# Patient Record
Sex: FEMALE | Race: WHITE | NOT HISPANIC OR LATINO | URBAN - METROPOLITAN AREA
[De-identification: names, ages, dates, MRNs, and addresses within clinical notes are randomized per-mention and may not be internally consistent; named-entity substitution may affect disease eponyms.]

---
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
## 2022-07-23 ENCOUNTER — APPOINTMENT (OUTPATIENT)
Dept: PEDIATRICS | Facility: CLINIC | Age: 22
End: 2022-07-23

## 2022-08-23 RX ORDER — OFLOXACIN OTIC 3 MG/ML
0.3 SOLUTION AURICULAR (OTIC)
Qty: 1 | Refills: 0 | Status: ACTIVE | COMMUNITY
Start: 2022-08-23 | End: 1900-01-01

## 2022-08-23 RX ORDER — AMOXICILLIN AND CLAVULANATE POTASSIUM 875; 125 MG/1; MG/1
875-125 TABLET, COATED ORAL
Qty: 14 | Refills: 0 | Status: ACTIVE | COMMUNITY
Start: 2021-03-23 | End: 1900-01-01

## 2022-08-30 ENCOUNTER — EMERGENCY (EMERGENCY)
Facility: HOSPITAL | Age: 22
LOS: 0 days | Discharge: HOME | End: 2022-08-31
Attending: EMERGENCY MEDICINE | Admitting: EMERGENCY MEDICINE

## 2022-08-30 VITALS
HEART RATE: 111 BPM | DIASTOLIC BLOOD PRESSURE: 83 MMHG | TEMPERATURE: 209 F | OXYGEN SATURATION: 98 % | RESPIRATION RATE: 20 BRPM | SYSTOLIC BLOOD PRESSURE: 129 MMHG | WEIGHT: 116.84 LBS

## 2022-08-30 DIAGNOSIS — Z79.4 LONG TERM (CURRENT) USE OF INSULIN: ICD-10-CM

## 2022-08-30 DIAGNOSIS — J02.9 ACUTE PHARYNGITIS, UNSPECIFIED: ICD-10-CM

## 2022-08-30 DIAGNOSIS — U07.1 COVID-19: ICD-10-CM

## 2022-08-30 DIAGNOSIS — E10.9 TYPE 1 DIABETES MELLITUS WITHOUT COMPLICATIONS: ICD-10-CM

## 2022-08-30 DIAGNOSIS — R00.2 PALPITATIONS: ICD-10-CM

## 2022-08-30 DIAGNOSIS — R00.0 TACHYCARDIA, UNSPECIFIED: ICD-10-CM

## 2022-08-30 DIAGNOSIS — E06.3 AUTOIMMUNE THYROIDITIS: ICD-10-CM

## 2022-08-30 DIAGNOSIS — J34.89 OTHER SPECIFIED DISORDERS OF NOSE AND NASAL SINUSES: ICD-10-CM

## 2022-08-30 DIAGNOSIS — Z90.89 ACQUIRED ABSENCE OF OTHER ORGANS: Chronic | ICD-10-CM

## 2022-08-30 LAB
BASE EXCESS BLDV CALC-SCNC: 0.1 MMOL/L — SIGNIFICANT CHANGE UP (ref -2–3)
CA-I SERPL-SCNC: 1.2 MMOL/L — SIGNIFICANT CHANGE UP (ref 1.15–1.33)
GAS PNL BLDV: 137 MMOL/L — SIGNIFICANT CHANGE UP (ref 136–145)
GAS PNL BLDV: SIGNIFICANT CHANGE UP
HCO3 BLDV-SCNC: 25 MMOL/L — SIGNIFICANT CHANGE UP (ref 22–29)
HCT VFR BLDA CALC: 33 % — LOW (ref 39–51)
HGB BLD CALC-MCNC: 11.1 G/DL — LOW (ref 12.6–17.4)
LACTATE BLDV-MCNC: 1.1 MMOL/L — SIGNIFICANT CHANGE UP (ref 0.5–2)
PCO2 BLDV: 39 MMHG — SIGNIFICANT CHANGE UP (ref 39–42)
PH BLDV: 7.41 — SIGNIFICANT CHANGE UP (ref 7.32–7.43)
PO2 BLDV: 39 MMHG — SIGNIFICANT CHANGE UP
POTASSIUM BLDV-SCNC: 3 MMOL/L — LOW (ref 3.5–5.1)
SAO2 % BLDV: 67.4 % — SIGNIFICANT CHANGE UP

## 2022-08-30 PROCEDURE — 99285 EMERGENCY DEPT VISIT HI MDM: CPT

## 2022-08-30 PROCEDURE — 93010 ELECTROCARDIOGRAM REPORT: CPT

## 2022-08-30 RX ORDER — SODIUM CHLORIDE 9 MG/ML
1000 INJECTION, SOLUTION INTRAVENOUS ONCE
Refills: 0 | Status: COMPLETED | OUTPATIENT
Start: 2022-08-30 | End: 2022-08-30

## 2022-08-30 RX ADMIN — SODIUM CHLORIDE 1000 MILLILITER(S): 9 INJECTION, SOLUTION INTRAVENOUS at 23:46

## 2022-08-30 NOTE — ED PROVIDER NOTE - PROGRESS NOTE DETAILS
[place]: Results d/w patient and family and copies of results provided.  Pt instructed to return if any worsening symptoms or concerns.  Discussed with patient follow up and care plan. They verbalize understanding all discussed and all questions answered..

## 2022-08-30 NOTE — ED PROVIDER NOTE - NS ED ROS FT
Review of Systems  Constitutional:  No fever, chills, malaise, generalized weakness.  Eyes:  No visual changes, eye pain, or discharge.  ENMT:  No hearing changes, pain, or discharge. + nasal congestion, -discharge. + throat pain,- swelling, - difficulty swallowing.  Cardiac:  No chest pain, + palpitations, syncope, or edema.  Respiratory:  No dyspnea, orthopnea, stridor, wheezing, or cough. No hemoptysis.  GI:  No nausea, vomiting, diarrhea, or abdominal pain. No melena or hematochezia.  :  No dysuria  MS:  No myalgia, muscle weakness, gait abnormality, joint swelling, joint pain,  back pain.  Skin:  No skin rash, pruritis, jaundice, or lesions.  Neuro:  + headache, dizziness, loss of sensation, or focal weakness.  No change in mental status.

## 2022-08-30 NOTE — ED PROVIDER NOTE - PHYSICAL EXAMINATION
VITAL SIGNS: I have reviewed nursing notes and confirm.  CONSTITUTIONAL: Well-developed; well-nourished; in no acute distress.  SKIN: Skin exam is warm and dry, no acute rash.  HEAD: Normocephalic; atraumatic.  EYES: PERRL, EOM intact; conjunctiva and sclera clear.  ENT: No nasal discharge; airway clear. TMs clear.  NECK: Supple; non tender.  CARD: S1, S2 normal; no murmurs, gallops, or rubs. Regular rate and rhythm.  RESP: No wheezes, rales or rhonchi. Speaking in full sentences.   ABD: sounds; soft; non-distended; non-tender. No CVA tenderness.  EXT: Normal ROM. No clubbing, cyanosis or edema.

## 2022-08-30 NOTE — ED PROVIDER NOTE - NS ED ATTENDING STATEMENT MOD
This was a shared visit with the JANUARY. I reviewed and verified the documentation and independently performed the documented:

## 2022-08-30 NOTE — ED PROVIDER NOTE - OBJECTIVE STATEMENT
22-year-old female past medical history of diabetes type 1 on insulin, Hashimoto present for 1 day of palpitation with associated sore throat and runny nose.  Patient states that she noticed her heart rate went up to the 160s on her apple watch and which concerned her and brought her to the ED.  Patient unaware if she has had any recent sick contacts.  Denies fever, chills, abdominal pain, numbness/tingling to hands or feet, CP

## 2022-08-30 NOTE — ED PROVIDER NOTE - PATIENT PORTAL LINK FT
You can access the FollowMyHealth Patient Portal offered by [facility] by registering at the following website: [email or web address]. By joining [facility]’s FollowMyHealth portal, you will also be able to view your health information using other applications (apps) compatible with our system.

## 2022-08-30 NOTE — ED PROVIDER NOTE - NSFOLLOWUPINSTRUCTIONS_ED_ALL_ED_FT
Please follow up with your primary care doctor in 1-3 days     Viral Illness, Adult  Viruses are tiny germs that can get into a person's body and cause illness. There are many different types of viruses, and they cause many types of illness. Viral illnesses can range from mild to severe. They can affect various parts of the body.    Common illnesses that are caused by a virus include colds and the flu. Viral illnesses also include serious conditions such as HIV/AIDS (human immunodeficiency virus/acquired immunodeficiency syndrome). A few viruses have been linked to certain cancers.    What are the causes?  Many types of viruses can cause illness. Viruses invade cells in your body, multiply, and cause the infected cells to malfunction or die. When the cell dies, it releases more of the virus. When this happens, you develop symptoms of the illness, and the virus continues to spread to other cells. If the virus takes over the function of the cell, it can cause the cell to divide and grow out of control, as is the case when a virus causes cancer.    Different viruses get into the body in different ways. You can get a virus by:    Swallowing food or water that is contaminated with the virus.  Breathing in droplets that have been coughed or sneezed into the air by an infected person.  Touching a surface that has been contaminated with the virus and then touching your eyes, nose, or mouth.  Being bitten by an insect or animal that carries the virus.  Having sexual contact with a person who is infected with the virus.  Being exposed to blood or fluids that contain the virus, either through an open cut or during a transfusion.    If a virus enters your body, your body's defense system (immune system) will try to fight the virus. You may be at higher risk for a viral illness if your immune system is weak.    What are the signs or symptoms?  Symptoms vary depending on the type of virus and the location of the cells that it invades. Common symptoms of the main types of viral illnesses include:    Cold and flu viruses     Fever.  Headache.  Sore throat.  Muscle aches.  Nasal congestion.  Cough.  Digestive system (gastrointestinal) viruses     Fever.  Abdominal pain.  Nausea.  Diarrhea.  Liver viruses (hepatitis)     Loss of appetite.  Tiredness.  Yellowing of the skin (jaundice).  Brain and spinal cord viruses     Fever.  Headache.  Stiff neck.  Nausea and vomiting.  Confusion or sleepiness.  Skin viruses     Warts.  Itching.  Rash.  Sexually transmitted viruses     Discharge.  Swelling.  Redness.  Rash.  How is this treated?  Viruses can be difficult to treat because they live within cells. Antibiotic medicines do not treat viruses because these drugs do not get inside cells. Treatment for a viral illness may include:    Resting and drinking plenty of fluids.  Medicines to relieve symptoms. These can include over-the-counter medicine for pain and fever, medicines for cough or congestion, and medicines to relieve diarrhea.  Antiviral medicines. These drugs are available only for certain types of viruses. They may help reduce flu symptoms if taken early. There are also many antiviral medicines for hepatitis and HIV/AIDS.    Some viral illnesses can be prevented with vaccinations. A common example is the flu shot.    Follow these instructions at home:  Medicines     Image   Take over-the-counter and prescription medicines only as told by your health care provider.  If you were prescribed an antiviral medicine, take it as told by your health care provider. Do not stop taking the medicine even if you start to feel better.  Be aware of when antibiotics are needed and when they are not needed. Antibiotics do not treat viruses. If your health care provider thinks that you may have a bacterial infection as well as a viral infection, you may get an antibiotic.    Do not ask for an antibiotic prescription if you have been diagnosed with a viral illness. That will not make your illness go away faster.  Frequently taking antibiotics when they are not needed can lead to antibiotic resistance. When this develops, the medicine no longer works against the bacteria that it normally fights.    General instructions     Drink enough fluids to keep your urine clear or pale yellow.  Rest as much as possible.  Return to your normal activities as told by your health care provider. Ask your health care provider what activities are safe for you.  Keep all follow-up visits as told by your health care provider. This is important.  How is this prevented?  ImageTake these actions to reduce your risk of viral infection:    Eat a healthy diet and get enough rest.  Wash your hands often with soap and water. This is especially important when you are in public places. If soap and water are not available, use hand .  Avoid close contact with friends and family who have a viral illness.  If you travel to areas where viral gastrointestinal infection is common, avoid drinking water or eating raw food.  Keep your immunizations up to date. Get a flu shot every year as told by your health care provider.  Do not share toothbrushes, nail clippers, razors, or needles with other people.  Always practice safe sex.    Contact a health care provider if:  You have symptoms of a viral illness that do not go away.  Your symptoms come back after going away.  Your symptoms get worse.  Get help right away if:  You have trouble breathing.  You have a severe headache or a stiff neck.  You have severe vomiting or abdominal pain.  This information is not intended to replace advice given to you by your health care provider. Make sure you discuss any questions you have with your health care provider.

## 2022-08-30 NOTE — ED PROVIDER NOTE - CLINICAL SUMMARY MEDICAL DECISION MAKING FREE TEXT BOX
23 yo F, hx of DM I here for assessment of palpitations, dyspnea today -- no nausea, vomiting, diarrhea, chest pain, dizziness. No elevated FSG.   No recent travel, sick contacts.    VS notable for , patient anxious but in no distress, clear lungs, RR, soft, NT, ND abdomen, no rash, mild nasal congestion, edematous turbinates, normal appearing oropharynx, normal Tms.    Labs without signs of DKA, d dimer negative, EKG without acute ischemic changes.    RVP + for adenovirus.    Sx likely related to viral illness, patient feeling much better after fluids now with normal VS. Will dc home with close monitoring of sx, return precautions, follow up.

## 2022-08-31 ENCOUNTER — LABORATORY RESULT (OUTPATIENT)
Age: 22
End: 2022-08-31

## 2022-08-31 ENCOUNTER — APPOINTMENT (OUTPATIENT)
Dept: OTOLARYNGOLOGY | Facility: CLINIC | Age: 22
End: 2022-08-31

## 2022-08-31 VITALS
DIASTOLIC BLOOD PRESSURE: 61 MMHG | RESPIRATION RATE: 17 BRPM | TEMPERATURE: 99 F | SYSTOLIC BLOOD PRESSURE: 119 MMHG | OXYGEN SATURATION: 98 % | HEART RATE: 107 BPM

## 2022-08-31 LAB
ALBUMIN SERPL ELPH-MCNC: 4.2 G/DL — SIGNIFICANT CHANGE UP (ref 3.5–5.2)
ALP SERPL-CCNC: 68 U/L — SIGNIFICANT CHANGE UP (ref 30–115)
ALT FLD-CCNC: 10 U/L — LOW (ref 14–37)
ANION GAP SERPL CALC-SCNC: 11 MMOL/L — SIGNIFICANT CHANGE UP (ref 7–14)
AST SERPL-CCNC: 19 U/L — SIGNIFICANT CHANGE UP (ref 14–37)
B-OH-BUTYR SERPL-SCNC: <0.2 MMOL/L — SIGNIFICANT CHANGE UP
BASOPHILS # BLD AUTO: 0.05 K/UL — SIGNIFICANT CHANGE UP (ref 0–0.2)
BASOPHILS NFR BLD AUTO: 0.6 % — SIGNIFICANT CHANGE UP (ref 0–1)
BILIRUB SERPL-MCNC: <0.2 MG/DL — SIGNIFICANT CHANGE UP (ref 0.2–1.2)
BUN SERPL-MCNC: 10 MG/DL — SIGNIFICANT CHANGE UP (ref 10–20)
CALCIUM SERPL-MCNC: 9.2 MG/DL — SIGNIFICANT CHANGE UP (ref 8.5–10.1)
CHLORIDE SERPL-SCNC: 107 MMOL/L — SIGNIFICANT CHANGE UP (ref 98–110)
CO2 SERPL-SCNC: 23 MMOL/L — SIGNIFICANT CHANGE UP (ref 17–32)
CREAT SERPL-MCNC: 0.5 MG/DL — LOW (ref 0.7–1.5)
D DIMER BLD IA.RAPID-MCNC: <150 NG/ML DDU — SIGNIFICANT CHANGE UP (ref 0–230)
EGFR: 138 ML/MIN/1.73M2 — SIGNIFICANT CHANGE UP
EOSINOPHIL # BLD AUTO: 0.06 K/UL — SIGNIFICANT CHANGE UP (ref 0–0.7)
EOSINOPHIL NFR BLD AUTO: 0.8 % — SIGNIFICANT CHANGE UP (ref 0–8)
GLUCOSE SERPL-MCNC: 160 MG/DL — HIGH (ref 70–99)
HADV DNA SPEC QL NAA+PROBE: DETECTED
HCG SERPL QL: NEGATIVE — SIGNIFICANT CHANGE UP
HCT VFR BLD CALC: 36.9 % — LOW (ref 37–47)
HGB BLD-MCNC: 12.2 G/DL — SIGNIFICANT CHANGE UP (ref 12–16)
IMM GRANULOCYTES NFR BLD AUTO: 0.3 % — SIGNIFICANT CHANGE UP (ref 0.1–0.3)
LYMPHOCYTES # BLD AUTO: 1.8 K/UL — SIGNIFICANT CHANGE UP (ref 1.2–3.4)
LYMPHOCYTES # BLD AUTO: 23.3 % — SIGNIFICANT CHANGE UP (ref 20.5–51.1)
MCHC RBC-ENTMCNC: 30.7 PG — SIGNIFICANT CHANGE UP (ref 27–31)
MCHC RBC-ENTMCNC: 33.1 G/DL — SIGNIFICANT CHANGE UP (ref 32–37)
MCV RBC AUTO: 92.7 FL — SIGNIFICANT CHANGE UP (ref 81–99)
MONOCYTES # BLD AUTO: 0.78 K/UL — HIGH (ref 0.1–0.6)
MONOCYTES NFR BLD AUTO: 10.1 % — HIGH (ref 1.7–9.3)
NEUTROPHILS # BLD AUTO: 5.03 K/UL — SIGNIFICANT CHANGE UP (ref 1.4–6.5)
NEUTROPHILS NFR BLD AUTO: 64.9 % — SIGNIFICANT CHANGE UP (ref 42.2–75.2)
NRBC # BLD: 0 /100 WBCS — SIGNIFICANT CHANGE UP (ref 0–0)
PLATELET # BLD AUTO: 309 K/UL — SIGNIFICANT CHANGE UP (ref 130–400)
POTASSIUM SERPL-MCNC: 3.9 MMOL/L — SIGNIFICANT CHANGE UP (ref 3.5–5)
POTASSIUM SERPL-SCNC: 3.9 MMOL/L — SIGNIFICANT CHANGE UP (ref 3.5–5)
PROT SERPL-MCNC: 6.6 G/DL — SIGNIFICANT CHANGE UP (ref 6–8)
RAPID RVP RESULT: DETECTED
RBC # BLD: 3.98 M/UL — LOW (ref 4.2–5.4)
RBC # FLD: 12 % — SIGNIFICANT CHANGE UP (ref 11.5–14.5)
SARS-COV-2 RNA SPEC QL NAA+PROBE: SIGNIFICANT CHANGE UP
SODIUM SERPL-SCNC: 141 MMOL/L — SIGNIFICANT CHANGE UP (ref 135–146)
WBC # BLD: 7.74 K/UL — SIGNIFICANT CHANGE UP (ref 4.8–10.8)
WBC # FLD AUTO: 7.74 K/UL — SIGNIFICANT CHANGE UP (ref 4.8–10.8)

## 2022-08-31 PROCEDURE — 71045 X-RAY EXAM CHEST 1 VIEW: CPT | Mod: 26

## 2022-08-31 PROCEDURE — 69210 REMOVE IMPACTED EAR WAX UNI: CPT

## 2022-08-31 PROCEDURE — 99214 OFFICE O/P EST MOD 30 MIN: CPT | Mod: 25

## 2022-08-31 NOTE — ASSESSMENT
[FreeTextEntry1] : right otorhea and wax cleaned.\par \par Culture taken.\par \par part of history and discussion with patient's mother.\par \par Continue augmentin. Will resume floxin ear drops depending on culture results.

## 2022-08-31 NOTE — HISTORY OF PRESENT ILLNESS
[FreeTextEntry1] : Patient returns today for trouble hearing from ear. Ongoing since she had ear surgery (right tympanoplasty on 3/5/2021) with Dr. Blanco. Patient started ear drops but states her ear canals are small and the drops are not entering. Patient states she also has drainage from right ear. Patient has some ear pain only in the mornings.

## 2022-08-31 NOTE — REASON FOR VISIT
[Subsequent Evaluation] : a subsequent evaluation for [Ear Drainage] : ear drainage [Ear Pain] : ear pain [Hearing Loss] : hearing loss

## 2022-08-31 NOTE — ED PEDIATRIC NURSE NOTE - OBJECTIVE STATEMENT
Patient reports having palpitations, with SOB this morning. Also complaining of back pain.  Patient denies N/V/D.

## 2022-08-31 NOTE — PHYSICAL EXAM
[Normal] : temporomandibular joint is normal [de-identified] : right impacted wax cleaned and debrided/.

## 2022-09-06 RX ORDER — ACETIC ACID 20 MG/ML
2 SOLUTION AURICULAR (OTIC)
Qty: 2 | Refills: 0 | Status: ACTIVE | COMMUNITY
Start: 2022-09-06 | End: 1900-01-01

## 2022-09-29 ENCOUNTER — APPOINTMENT (OUTPATIENT)
Dept: OTOLARYNGOLOGY | Facility: CLINIC | Age: 22
End: 2022-09-29

## 2022-09-29 DIAGNOSIS — Z98.890 OTHER SPECIFIED POSTPROCEDURAL STATES: ICD-10-CM

## 2022-09-29 DIAGNOSIS — H93.8X9 OTHER SPECIFIED DISORDERS OF EAR, UNSPECIFIED EAR: ICD-10-CM

## 2022-09-29 DIAGNOSIS — H69.83 OTHER SPECIFIED DISORDERS OF EUSTACHIAN TUBE, BILATERAL: ICD-10-CM

## 2022-09-29 PROCEDURE — 99214 OFFICE O/P EST MOD 30 MIN: CPT | Mod: 25

## 2022-09-29 PROCEDURE — 69210 REMOVE IMPACTED EAR WAX UNI: CPT

## 2022-09-29 RX ORDER — METHYLPREDNISOLONE 4 MG/1
4 TABLET ORAL
Qty: 1 | Refills: 0 | Status: ACTIVE | COMMUNITY
Start: 2022-09-29 | End: 1900-01-01

## 2022-09-29 RX ORDER — FEXOFENADINE HCL AND PSEUDOEPHEDRINE HCI 60; 120 MG/1; MG/1
60-120 TABLET, EXTENDED RELEASE ORAL
Qty: 60 | Refills: 10 | Status: ACTIVE | COMMUNITY
Start: 2022-09-29 | End: 1900-01-01

## 2022-09-29 RX ORDER — FLUTICASONE PROPIONATE 50 UG/1
50 SPRAY, METERED NASAL DAILY
Qty: 1 | Refills: 10 | Status: ACTIVE | COMMUNITY
Start: 2022-09-29 | End: 1900-01-01

## 2022-09-29 NOTE — HISTORY OF PRESENT ILLNESS
[FreeTextEntry1] : Patient here today following up on otorrhea  of right ear.    Patient drainage comes and goes since she had the surgery  in 2021.  She noticed yesterday a small  bump inside of left ear.  Pain when she touches it only.

## 2023-02-14 ENCOUNTER — NON-APPOINTMENT (OUTPATIENT)
Age: 23
End: 2023-02-14

## 2023-05-07 ENCOUNTER — APPOINTMENT (OUTPATIENT)
Dept: ORTHOPEDIC SURGERY | Facility: CLINIC | Age: 23
End: 2023-05-07
Payer: COMMERCIAL

## 2023-05-07 DIAGNOSIS — S80.01XA CONTUSION OF RIGHT KNEE, INITIAL ENCOUNTER: ICD-10-CM

## 2023-05-07 PROCEDURE — 73562 X-RAY EXAM OF KNEE 3: CPT | Mod: RT

## 2023-05-07 PROCEDURE — 99203 OFFICE O/P NEW LOW 30 MIN: CPT

## 2023-05-07 NOTE — DISCUSSION/SUMMARY
[de-identified] : Treatment plan as discussed:\par \par She likely has a contusion of the right knee given the patient's history, physical examination findings, and x-ray findings.  Patient understands that bone contusions take 4 to 6 weeks to fully heal that the first 2 weeks or is the worst in terms of pain and swelling.\par \par I recommended anti-inflammatory medication. Patient agrees to taking Advil/Ibuprofen OTC as needed for pain. Benefits discussed. Confirmed no contraindication to NSAIDs.\par \par I recommended patient rest, ice, compress, and elevate the left knee regularly. Encouraged activity modification as tolerable. Encouraged gentle range of motion to avoid stiffness. No gym /sports at least until further evaluation.  She may bear weight as tolerated.  Encouraged her to let pain be her guide.\par \par All questions and concerns addressed to patient's satisfaction. Patient expresses full understanding of treatment plan.\par Patient will follow up in 3-4 weeks with Heather WADSWORTH for further evaluation treatment.\par

## 2023-05-07 NOTE — IMAGING
[de-identified] : Examination of the right knee: Mild swelling appreciated throughout.  No ecchymosis erythema appreciated.  Skin is intact.  Patient mildly tender to palpation along the lateral aspect of the right knee.  No tenderness of the patella, patella tendon, quad tendon.  No tenderness at the medial joint line.  Negative Tyrel's.  Negative Lachman's.  Stable to varus and valgus stress.  Patient is full range of motion without any limitations however experiences mild pain upon full flexion.  She can straight leg raise at the table.  Calf is soft and nontender.  Sensorimotor intact distally.

## 2023-05-07 NOTE — DATA REVIEWED
[Lower extremity] : lower extremity [FreeTextEntry1] : X-rays of the right knee taken in the office today:  No acute fractures, subluxations, or dislocations.\par

## 2023-05-07 NOTE — HISTORY OF PRESENT ILLNESS
[de-identified] : Patient is a 22-year-old female here for evaluation of right knee pain.  Patient reports on 5/6/2023 she was walking when she missed a step causing her to fall directly on the anterior and lateral aspect of her right knee.  She reports swelling and pain following the injury.  She is tried Advil over-the-counter with relief.  She is able to bear weight and ambulate without any limitations.

## 2023-08-01 NOTE — REVIEW OF SYSTEMS
[Patient Intake Form Reviewed] : Patient intake form was reviewed Cibinqo Pregnancy And Lactation Text: It is unknown if this medication will adversely affect pregnancy or breast feeding.  You should not take this medication if you are currently pregnant or planning a pregnancy or while breastfeeding.

## 2023-08-25 NOTE — H&P PEDIATRIC - NSHPSOURCEINFORD_GEN_ALL_CORE
Detail Level: Detailed Mother Quality 431: Preventive Care And Screening: Unhealthy Alcohol Use - Screening: Patient not identified as an unhealthy alcohol user when screened for unhealthy alcohol use using a systematic screening method Quality 226: Preventive Care And Screening: Tobacco Use: Screening And Cessation Intervention: Patient screened for tobacco use and is an ex/non-smoker

## 2023-11-05 ENCOUNTER — INPATIENT (INPATIENT)
Facility: HOSPITAL | Age: 23
LOS: 3 days | Discharge: HOME | DRG: 637 | End: 2023-11-09
Attending: INTERNAL MEDICINE | Admitting: STUDENT IN AN ORGANIZED HEALTH CARE EDUCATION/TRAINING PROGRAM
Payer: COMMERCIAL

## 2023-11-05 VITALS
TEMPERATURE: 98 F | HEART RATE: 112 BPM | RESPIRATION RATE: 18 BRPM | OXYGEN SATURATION: 98 % | SYSTOLIC BLOOD PRESSURE: 132 MMHG | DIASTOLIC BLOOD PRESSURE: 62 MMHG

## 2023-11-05 DIAGNOSIS — E08.10 DIABETES MELLITUS DUE TO UNDERLYING CONDITION WITH KETOACIDOSIS WITHOUT COMA: ICD-10-CM

## 2023-11-05 DIAGNOSIS — Z90.89 ACQUIRED ABSENCE OF OTHER ORGANS: Chronic | ICD-10-CM

## 2023-11-05 LAB
ALBUMIN SERPL ELPH-MCNC: 4.9 G/DL — SIGNIFICANT CHANGE UP (ref 3.5–5.2)
ALBUMIN SERPL ELPH-MCNC: 4.9 G/DL — SIGNIFICANT CHANGE UP (ref 3.5–5.2)
ALP SERPL-CCNC: 84 U/L — SIGNIFICANT CHANGE UP (ref 30–115)
ALP SERPL-CCNC: 84 U/L — SIGNIFICANT CHANGE UP (ref 30–115)
ALT FLD-CCNC: 10 U/L — SIGNIFICANT CHANGE UP (ref 0–41)
ALT FLD-CCNC: 10 U/L — SIGNIFICANT CHANGE UP (ref 0–41)
ANION GAP SERPL CALC-SCNC: 26 MMOL/L — HIGH (ref 7–14)
ANION GAP SERPL CALC-SCNC: 26 MMOL/L — HIGH (ref 7–14)
APPEARANCE UR: CLEAR — SIGNIFICANT CHANGE UP
APPEARANCE UR: CLEAR — SIGNIFICANT CHANGE UP
AST SERPL-CCNC: 26 U/L — SIGNIFICANT CHANGE UP (ref 0–41)
AST SERPL-CCNC: 26 U/L — SIGNIFICANT CHANGE UP (ref 0–41)
B-OH-BUTYR SERPL-SCNC: 5.1 MMOL/L — HIGH
B-OH-BUTYR SERPL-SCNC: 5.1 MMOL/L — HIGH
BACTERIA # UR AUTO: ABNORMAL /HPF
BACTERIA # UR AUTO: ABNORMAL /HPF
BASE EXCESS BLDV CALC-SCNC: -10.3 MMOL/L — LOW (ref -2–3)
BASE EXCESS BLDV CALC-SCNC: -10.3 MMOL/L — LOW (ref -2–3)
BASOPHILS # BLD AUTO: 0.03 K/UL — SIGNIFICANT CHANGE UP (ref 0–0.2)
BASOPHILS # BLD AUTO: 0.03 K/UL — SIGNIFICANT CHANGE UP (ref 0–0.2)
BASOPHILS NFR BLD AUTO: 0.4 % — SIGNIFICANT CHANGE UP (ref 0–1)
BASOPHILS NFR BLD AUTO: 0.4 % — SIGNIFICANT CHANGE UP (ref 0–1)
BILIRUB SERPL-MCNC: 0.3 MG/DL — SIGNIFICANT CHANGE UP (ref 0.2–1.2)
BILIRUB SERPL-MCNC: 0.3 MG/DL — SIGNIFICANT CHANGE UP (ref 0.2–1.2)
BILIRUB UR-MCNC: NEGATIVE — SIGNIFICANT CHANGE UP
BILIRUB UR-MCNC: NEGATIVE — SIGNIFICANT CHANGE UP
BUN SERPL-MCNC: 14 MG/DL — SIGNIFICANT CHANGE UP (ref 10–20)
BUN SERPL-MCNC: 14 MG/DL — SIGNIFICANT CHANGE UP (ref 10–20)
CA-I SERPL-SCNC: 1.25 MMOL/L — SIGNIFICANT CHANGE UP (ref 1.15–1.33)
CA-I SERPL-SCNC: 1.25 MMOL/L — SIGNIFICANT CHANGE UP (ref 1.15–1.33)
CALCIUM SERPL-MCNC: 9.8 MG/DL — SIGNIFICANT CHANGE UP (ref 8.4–10.5)
CALCIUM SERPL-MCNC: 9.8 MG/DL — SIGNIFICANT CHANGE UP (ref 8.4–10.5)
CAST: 0 /LPF — SIGNIFICANT CHANGE UP (ref 0–4)
CAST: 0 /LPF — SIGNIFICANT CHANGE UP (ref 0–4)
CHLORIDE SERPL-SCNC: 92 MMOL/L — LOW (ref 98–110)
CHLORIDE SERPL-SCNC: 92 MMOL/L — LOW (ref 98–110)
CO2 SERPL-SCNC: 12 MMOL/L — LOW (ref 17–32)
CO2 SERPL-SCNC: 12 MMOL/L — LOW (ref 17–32)
COLOR SPEC: SIGNIFICANT CHANGE UP
COLOR SPEC: SIGNIFICANT CHANGE UP
CREAT SERPL-MCNC: 0.8 MG/DL — SIGNIFICANT CHANGE UP (ref 0.7–1.5)
CREAT SERPL-MCNC: 0.8 MG/DL — SIGNIFICANT CHANGE UP (ref 0.7–1.5)
DIFF PNL FLD: ABNORMAL
DIFF PNL FLD: ABNORMAL
EGFR: 107 ML/MIN/1.73M2 — SIGNIFICANT CHANGE UP
EGFR: 107 ML/MIN/1.73M2 — SIGNIFICANT CHANGE UP
EOSINOPHIL # BLD AUTO: 0.06 K/UL — SIGNIFICANT CHANGE UP (ref 0–0.7)
EOSINOPHIL # BLD AUTO: 0.06 K/UL — SIGNIFICANT CHANGE UP (ref 0–0.7)
EOSINOPHIL NFR BLD AUTO: 0.8 % — SIGNIFICANT CHANGE UP (ref 0–8)
EOSINOPHIL NFR BLD AUTO: 0.8 % — SIGNIFICANT CHANGE UP (ref 0–8)
GAS PNL BLDV: 129 MMOL/L — LOW (ref 136–145)
GAS PNL BLDV: 129 MMOL/L — LOW (ref 136–145)
GAS PNL BLDV: SIGNIFICANT CHANGE UP
GLUCOSE BLDC GLUCOMTR-MCNC: 258 MG/DL — HIGH (ref 70–99)
GLUCOSE BLDC GLUCOMTR-MCNC: 258 MG/DL — HIGH (ref 70–99)
GLUCOSE SERPL-MCNC: 476 MG/DL — CRITICAL HIGH (ref 70–99)
GLUCOSE SERPL-MCNC: 476 MG/DL — CRITICAL HIGH (ref 70–99)
GLUCOSE UR QL: >=1000 MG/DL
GLUCOSE UR QL: >=1000 MG/DL
HCG SERPL QL: NEGATIVE — SIGNIFICANT CHANGE UP
HCG SERPL QL: NEGATIVE — SIGNIFICANT CHANGE UP
HCO3 BLDV-SCNC: 16 MMOL/L — LOW (ref 22–29)
HCO3 BLDV-SCNC: 16 MMOL/L — LOW (ref 22–29)
HCT VFR BLD CALC: 40.7 % — SIGNIFICANT CHANGE UP (ref 37–47)
HCT VFR BLD CALC: 40.7 % — SIGNIFICANT CHANGE UP (ref 37–47)
HCT VFR BLDA CALC: 42 % — SIGNIFICANT CHANGE UP (ref 39–51)
HCT VFR BLDA CALC: 42 % — SIGNIFICANT CHANGE UP (ref 39–51)
HGB BLD CALC-MCNC: 14 G/DL — SIGNIFICANT CHANGE UP (ref 12.6–17.4)
HGB BLD CALC-MCNC: 14 G/DL — SIGNIFICANT CHANGE UP (ref 12.6–17.4)
HGB BLD-MCNC: 13.3 G/DL — SIGNIFICANT CHANGE UP (ref 12–16)
HGB BLD-MCNC: 13.3 G/DL — SIGNIFICANT CHANGE UP (ref 12–16)
IMM GRANULOCYTES NFR BLD AUTO: 0.4 % — HIGH (ref 0.1–0.3)
IMM GRANULOCYTES NFR BLD AUTO: 0.4 % — HIGH (ref 0.1–0.3)
KETONES UR-MCNC: 80 MG/DL
KETONES UR-MCNC: 80 MG/DL
LACTATE BLDV-MCNC: 1.6 MMOL/L — SIGNIFICANT CHANGE UP (ref 0.5–2)
LACTATE BLDV-MCNC: 1.6 MMOL/L — SIGNIFICANT CHANGE UP (ref 0.5–2)
LACTATE SERPL-SCNC: 2.1 MMOL/L — HIGH (ref 0.7–2)
LACTATE SERPL-SCNC: 2.1 MMOL/L — HIGH (ref 0.7–2)
LEUKOCYTE ESTERASE UR-ACNC: NEGATIVE — SIGNIFICANT CHANGE UP
LEUKOCYTE ESTERASE UR-ACNC: NEGATIVE — SIGNIFICANT CHANGE UP
LYMPHOCYTES # BLD AUTO: 1.16 K/UL — LOW (ref 1.2–3.4)
LYMPHOCYTES # BLD AUTO: 1.16 K/UL — LOW (ref 1.2–3.4)
LYMPHOCYTES # BLD AUTO: 15.4 % — LOW (ref 20.5–51.1)
LYMPHOCYTES # BLD AUTO: 15.4 % — LOW (ref 20.5–51.1)
MCHC RBC-ENTMCNC: 30.8 PG — SIGNIFICANT CHANGE UP (ref 27–31)
MCHC RBC-ENTMCNC: 30.8 PG — SIGNIFICANT CHANGE UP (ref 27–31)
MCHC RBC-ENTMCNC: 32.7 G/DL — SIGNIFICANT CHANGE UP (ref 32–37)
MCHC RBC-ENTMCNC: 32.7 G/DL — SIGNIFICANT CHANGE UP (ref 32–37)
MCV RBC AUTO: 94.2 FL — SIGNIFICANT CHANGE UP (ref 81–99)
MCV RBC AUTO: 94.2 FL — SIGNIFICANT CHANGE UP (ref 81–99)
MONOCYTES # BLD AUTO: 0.6 K/UL — SIGNIFICANT CHANGE UP (ref 0.1–0.6)
MONOCYTES # BLD AUTO: 0.6 K/UL — SIGNIFICANT CHANGE UP (ref 0.1–0.6)
MONOCYTES NFR BLD AUTO: 8 % — SIGNIFICANT CHANGE UP (ref 1.7–9.3)
MONOCYTES NFR BLD AUTO: 8 % — SIGNIFICANT CHANGE UP (ref 1.7–9.3)
NEUTROPHILS # BLD AUTO: 5.64 K/UL — SIGNIFICANT CHANGE UP (ref 1.4–6.5)
NEUTROPHILS # BLD AUTO: 5.64 K/UL — SIGNIFICANT CHANGE UP (ref 1.4–6.5)
NEUTROPHILS NFR BLD AUTO: 75 % — SIGNIFICANT CHANGE UP (ref 42.2–75.2)
NEUTROPHILS NFR BLD AUTO: 75 % — SIGNIFICANT CHANGE UP (ref 42.2–75.2)
NITRITE UR-MCNC: POSITIVE
NITRITE UR-MCNC: POSITIVE
NRBC # BLD: 0 /100 WBCS — SIGNIFICANT CHANGE UP (ref 0–0)
NRBC # BLD: 0 /100 WBCS — SIGNIFICANT CHANGE UP (ref 0–0)
NT-PROBNP SERPL-SCNC: <36 PG/ML — SIGNIFICANT CHANGE UP (ref 0–300)
NT-PROBNP SERPL-SCNC: <36 PG/ML — SIGNIFICANT CHANGE UP (ref 0–300)
PCO2 BLDV: 34 MMHG — LOW (ref 39–42)
PCO2 BLDV: 34 MMHG — LOW (ref 39–42)
PH BLDV: 7.27 — LOW (ref 7.32–7.43)
PH BLDV: 7.27 — LOW (ref 7.32–7.43)
PH UR: 6 — SIGNIFICANT CHANGE UP (ref 5–8)
PH UR: 6 — SIGNIFICANT CHANGE UP (ref 5–8)
PLATELET # BLD AUTO: 328 K/UL — SIGNIFICANT CHANGE UP (ref 130–400)
PLATELET # BLD AUTO: 328 K/UL — SIGNIFICANT CHANGE UP (ref 130–400)
PMV BLD: 10 FL — SIGNIFICANT CHANGE UP (ref 7.4–10.4)
PMV BLD: 10 FL — SIGNIFICANT CHANGE UP (ref 7.4–10.4)
PO2 BLDV: 50 MMHG — SIGNIFICANT CHANGE UP
PO2 BLDV: 50 MMHG — SIGNIFICANT CHANGE UP
POTASSIUM BLDV-SCNC: 4.3 MMOL/L — SIGNIFICANT CHANGE UP (ref 3.5–5.1)
POTASSIUM BLDV-SCNC: 4.3 MMOL/L — SIGNIFICANT CHANGE UP (ref 3.5–5.1)
POTASSIUM SERPL-MCNC: 4.9 MMOL/L — SIGNIFICANT CHANGE UP (ref 3.5–5)
POTASSIUM SERPL-MCNC: 4.9 MMOL/L — SIGNIFICANT CHANGE UP (ref 3.5–5)
POTASSIUM SERPL-SCNC: 4.9 MMOL/L — SIGNIFICANT CHANGE UP (ref 3.5–5)
POTASSIUM SERPL-SCNC: 4.9 MMOL/L — SIGNIFICANT CHANGE UP (ref 3.5–5)
PROT SERPL-MCNC: 7.8 G/DL — SIGNIFICANT CHANGE UP (ref 6–8)
PROT SERPL-MCNC: 7.8 G/DL — SIGNIFICANT CHANGE UP (ref 6–8)
PROT UR-MCNC: NEGATIVE MG/DL — SIGNIFICANT CHANGE UP
PROT UR-MCNC: NEGATIVE MG/DL — SIGNIFICANT CHANGE UP
RBC # BLD: 4.32 M/UL — SIGNIFICANT CHANGE UP (ref 4.2–5.4)
RBC # BLD: 4.32 M/UL — SIGNIFICANT CHANGE UP (ref 4.2–5.4)
RBC # FLD: 11.8 % — SIGNIFICANT CHANGE UP (ref 11.5–14.5)
RBC # FLD: 11.8 % — SIGNIFICANT CHANGE UP (ref 11.5–14.5)
RBC CASTS # UR COMP ASSIST: 1 /HPF — SIGNIFICANT CHANGE UP (ref 0–4)
RBC CASTS # UR COMP ASSIST: 1 /HPF — SIGNIFICANT CHANGE UP (ref 0–4)
SAO2 % BLDV: 79.2 % — SIGNIFICANT CHANGE UP
SAO2 % BLDV: 79.2 % — SIGNIFICANT CHANGE UP
SARS-COV-2 RNA SPEC QL NAA+PROBE: DETECTED
SARS-COV-2 RNA SPEC QL NAA+PROBE: DETECTED
SODIUM SERPL-SCNC: 130 MMOL/L — LOW (ref 135–146)
SODIUM SERPL-SCNC: 130 MMOL/L — LOW (ref 135–146)
SP GR SPEC: >1.03 — HIGH (ref 1–1.03)
SP GR SPEC: >1.03 — HIGH (ref 1–1.03)
SQUAMOUS # UR AUTO: 1 /HPF — SIGNIFICANT CHANGE UP (ref 0–5)
SQUAMOUS # UR AUTO: 1 /HPF — SIGNIFICANT CHANGE UP (ref 0–5)
TROPONIN T SERPL-MCNC: <0.01 NG/ML — SIGNIFICANT CHANGE UP
TROPONIN T SERPL-MCNC: <0.01 NG/ML — SIGNIFICANT CHANGE UP
UROBILINOGEN FLD QL: 1 MG/DL — SIGNIFICANT CHANGE UP (ref 0.2–1)
UROBILINOGEN FLD QL: 1 MG/DL — SIGNIFICANT CHANGE UP (ref 0.2–1)
WBC # BLD: 7.52 K/UL — SIGNIFICANT CHANGE UP (ref 4.8–10.8)
WBC # BLD: 7.52 K/UL — SIGNIFICANT CHANGE UP (ref 4.8–10.8)
WBC # FLD AUTO: 7.52 K/UL — SIGNIFICANT CHANGE UP (ref 4.8–10.8)
WBC # FLD AUTO: 7.52 K/UL — SIGNIFICANT CHANGE UP (ref 4.8–10.8)
WBC UR QL: 9 /HPF — HIGH (ref 0–5)
WBC UR QL: 9 /HPF — HIGH (ref 0–5)

## 2023-11-05 PROCEDURE — 74018 RADEX ABDOMEN 1 VIEW: CPT

## 2023-11-05 PROCEDURE — 81001 URINALYSIS AUTO W/SCOPE: CPT

## 2023-11-05 PROCEDURE — 84439 ASSAY OF FREE THYROXINE: CPT

## 2023-11-05 PROCEDURE — 80061 LIPID PANEL: CPT

## 2023-11-05 PROCEDURE — 83036 HEMOGLOBIN GLYCOSYLATED A1C: CPT

## 2023-11-05 PROCEDURE — 80074 ACUTE HEPATITIS PANEL: CPT

## 2023-11-05 PROCEDURE — 82550 ASSAY OF CK (CPK): CPT

## 2023-11-05 PROCEDURE — 84443 ASSAY THYROID STIM HORMONE: CPT

## 2023-11-05 PROCEDURE — 83605 ASSAY OF LACTIC ACID: CPT

## 2023-11-05 PROCEDURE — 83735 ASSAY OF MAGNESIUM: CPT

## 2023-11-05 PROCEDURE — 80053 COMPREHEN METABOLIC PANEL: CPT

## 2023-11-05 PROCEDURE — 93010 ELECTROCARDIOGRAM REPORT: CPT

## 2023-11-05 PROCEDURE — 80076 HEPATIC FUNCTION PANEL: CPT

## 2023-11-05 PROCEDURE — 82962 GLUCOSE BLOOD TEST: CPT

## 2023-11-05 PROCEDURE — 71045 X-RAY EXAM CHEST 1 VIEW: CPT

## 2023-11-05 PROCEDURE — 80307 DRUG TEST PRSMV CHEM ANLYZR: CPT

## 2023-11-05 PROCEDURE — 99291 CRITICAL CARE FIRST HOUR: CPT

## 2023-11-05 PROCEDURE — 85025 COMPLETE CBC W/AUTO DIFF WBC: CPT

## 2023-11-05 PROCEDURE — 94760 N-INVAS EAR/PLS OXIMETRY 1: CPT

## 2023-11-05 PROCEDURE — 93975 VASCULAR STUDY: CPT

## 2023-11-05 PROCEDURE — 85610 PROTHROMBIN TIME: CPT

## 2023-11-05 PROCEDURE — 71046 X-RAY EXAM CHEST 2 VIEWS: CPT | Mod: 26

## 2023-11-05 PROCEDURE — 85730 THROMBOPLASTIN TIME PARTIAL: CPT

## 2023-11-05 PROCEDURE — 84145 PROCALCITONIN (PCT): CPT

## 2023-11-05 PROCEDURE — 85379 FIBRIN DEGRADATION QUANT: CPT

## 2023-11-05 PROCEDURE — 84100 ASSAY OF PHOSPHORUS: CPT

## 2023-11-05 PROCEDURE — 80048 BASIC METABOLIC PNL TOTAL CA: CPT

## 2023-11-05 PROCEDURE — 36415 COLL VENOUS BLD VENIPUNCTURE: CPT

## 2023-11-05 PROCEDURE — 83690 ASSAY OF LIPASE: CPT

## 2023-11-05 RX ORDER — INSULIN HUMAN 100 [IU]/ML
5 INJECTION, SOLUTION SUBCUTANEOUS
Qty: 100 | Refills: 0 | Status: DISCONTINUED | OUTPATIENT
Start: 2023-11-05 | End: 2023-11-06

## 2023-11-05 RX ORDER — SODIUM CHLORIDE 9 MG/ML
1000 INJECTION, SOLUTION INTRAVENOUS ONCE
Refills: 0 | Status: COMPLETED | OUTPATIENT
Start: 2023-11-05 | End: 2023-11-05

## 2023-11-05 RX ORDER — SENNA PLUS 8.6 MG/1
2 TABLET ORAL AT BEDTIME
Refills: 0 | Status: DISCONTINUED | OUTPATIENT
Start: 2023-11-05 | End: 2023-11-09

## 2023-11-05 RX ORDER — INSULIN HUMAN 100 [IU]/ML
8 INJECTION, SOLUTION SUBCUTANEOUS ONCE
Refills: 0 | Status: DISCONTINUED | OUTPATIENT
Start: 2023-11-05 | End: 2023-11-05

## 2023-11-05 RX ORDER — INSULIN HUMAN 4-8-12(60)
12 KIT INHALATION
Refills: 0 | DISCHARGE

## 2023-11-05 RX ORDER — ENOXAPARIN SODIUM 100 MG/ML
40 INJECTION SUBCUTANEOUS EVERY 24 HOURS
Refills: 0 | Status: DISCONTINUED | OUTPATIENT
Start: 2023-11-05 | End: 2023-11-09

## 2023-11-05 RX ORDER — DEXTROSE MONOHYDRATE, SODIUM CHLORIDE, AND POTASSIUM CHLORIDE 50; .745; 4.5 G/1000ML; G/1000ML; G/1000ML
1000 INJECTION, SOLUTION INTRAVENOUS
Refills: 0 | Status: DISCONTINUED | OUTPATIENT
Start: 2023-11-05 | End: 2023-11-06

## 2023-11-05 RX ORDER — SODIUM CHLORIDE 9 MG/ML
500 INJECTION, SOLUTION INTRAVENOUS ONCE
Refills: 0 | Status: COMPLETED | OUTPATIENT
Start: 2023-11-05 | End: 2023-11-05

## 2023-11-05 RX ORDER — POTASSIUM CHLORIDE 20 MEQ
40 PACKET (EA) ORAL ONCE
Refills: 0 | Status: COMPLETED | OUTPATIENT
Start: 2023-11-05 | End: 2023-11-05

## 2023-11-05 RX ORDER — POLYETHYLENE GLYCOL 3350 17 G/17G
17 POWDER, FOR SOLUTION ORAL EVERY 12 HOURS
Refills: 0 | Status: DISCONTINUED | OUTPATIENT
Start: 2023-11-05 | End: 2023-11-09

## 2023-11-05 RX ORDER — INFLUENZA VIRUS VACCINE 15; 15; 15; 15 UG/.5ML; UG/.5ML; UG/.5ML; UG/.5ML
0.5 SUSPENSION INTRAMUSCULAR ONCE
Refills: 0 | Status: DISCONTINUED | OUTPATIENT
Start: 2023-11-05 | End: 2023-11-09

## 2023-11-05 RX ADMIN — SODIUM CHLORIDE 500 MILLILITER(S): 9 INJECTION, SOLUTION INTRAVENOUS at 23:48

## 2023-11-05 RX ADMIN — SODIUM CHLORIDE 1000 MILLILITER(S): 9 INJECTION, SOLUTION INTRAVENOUS at 19:49

## 2023-11-05 RX ADMIN — Medication 40 MILLIEQUIVALENT(S): at 21:55

## 2023-11-05 RX ADMIN — SODIUM CHLORIDE 1000 MILLILITER(S): 9 INJECTION, SOLUTION INTRAVENOUS at 21:55

## 2023-11-05 RX ADMIN — INSULIN HUMAN 6 UNIT(S)/HR: 100 INJECTION, SOLUTION SUBCUTANEOUS at 21:59

## 2023-11-05 NOTE — PATIENT PROFILE ADULT - FALL HARM RISK - UNIVERSAL INTERVENTIONS
Bed in lowest position, wheels locked, appropriate side rails in place/Call bell, personal items and telephone in reach/Instruct patient to call for assistance before getting out of bed or chair/Non-slip footwear when patient is out of bed/[place] to call system/Physically safe environment - no spills, clutter or unnecessary equipment/Purposeful Proactive Rounding/Room/bathroom lighting operational, light cord in reach

## 2023-11-05 NOTE — ED PROVIDER NOTE - OBJECTIVE STATEMENT
23 year old female with pmhx of DM type 1, presents to ed with cough, myalgias, and hyperglycemia x 2 days. Pt admits to cough, congestion, sob, and weakness since yestesday, she took a covid test at home and it was positive. Pt states her glucose has been elevated in the 400s, has been giving herself more insulin. Pt denies nausea, vomiting, diarrhea, abd pain, chest pain, calf pain or swelling, or recent travel/sick contacts.

## 2023-11-05 NOTE — H&P ADULT - HISTORY OF PRESENT ILLNESS
23 year old female with pmhx of DM type 1 and Hashimoto thyroiditis? presents to ed with cough, myalgias, and hyperglycemia x 2 days. Pt admits to cough, congestion, sob, and weakness since yestesday, she took a covid test at home and it was positive. Pt states her glucose has been elevated in the 400s, has been giving herself more insulin. Pt denies nausea, vomiting, diarrhea, abd pain, chest pain, calf pain or swelling, or recent travel/sick contacts. 23 year old female with pmhx of DM type 1 (Uncontrolled, follows Dr. Cornelius) and Hashimoto thyroiditis (Controlled) presents to ed with cough, myalgias, and hyperglycemia x 2 days. She took a covid test at home and it was positive. Pt states her glucose has been elevated in the 400s, has been giving herself more insulin which is still NOT controlling her blood sugar. Pt denies nausea, vomiting, diarrhea, abd pain, chest pain, calf pain or swelling, or recent travel/sick contacts.    Endorses constipation, No BM since 1-2 weeks.    In the ED, BHB +ve, admitted to ICU for DKA.    Vital Signs Last 24 Hrs:  T(F): 98.2 (05 Nov 2023 18:12), Max: 98.2 (05 Nov 2023 18:12)  HR: 112 (05 Nov 2023 18:12) (112 - 112)  BP: 132/62 (05 Nov 2023 18:12) (132/62 - 132/62)  RR: 18 (05 Nov 2023 18:12) (18 - 18)  SpO2: 98% (05 Nov 2023 18:12) (98% - 98%) on RA

## 2023-11-05 NOTE — H&P ADULT - NSHPPHYSICALEXAM_GEN_ALL_CORE
GENERAL: NAD, lying in bed comfortably  CHEST/LUNG: No rales, rhonchi, wheezing, or rubs. Unlabored respirations  HEART: Tachycardia, No murmur  ABDOMEN: Soft, Nontender, Nondistended  EXTREMITIES:  No clubbing, cyanosis, or edema  NERVOUS SYSTEM:  Alert & Oriented X3, speech clear. No deficits   MSK: FROM all 4 extremities, full and equal strength

## 2023-11-05 NOTE — ED PROVIDER NOTE - ADDITIONAL HISTORY OBTAINED FROM [facility]
Callback to Neema,   Appointment to be set with Dr Wright.  Voices understanding.  
Patient calling asking for a virtual meeting with Dr Hernández to discuss current meningioma treatment and his thoughts on radiation treatment.    Patient states she is leaning towards Radiosurgery but wanted to discuss with Dr Hernández.    Will review with Dr De Paz and get back to patient.    Voices understanding.    ASV:  Do you want me to have patient make appointment with Dr Hernández, it would be a return visit.    Thanks    
Family

## 2023-11-05 NOTE — H&P ADULT - ASSESSMENT
IMPRESSION:    DKA  COVID-19 infection  HO Hashimoto thyroiditis    -Trend Lactate  -DKA protocol  -Fu RVP panel  -Fu TSH, Free T4    PLAN:    NEUROLOGY: Avoid CNS depressants    HEENT: Oral care    CARDIOVASCULAR: Daily weights. Strict I&Os, Keep K>4 and Mg>2    PULMONARY: HOB @ 45 degrees. Aspiration precautions. Keep SpO2 >     GASTROINTESTINAL: Diet, GI prophylaxis    RENAL: Monitor BMP. Correct lytes as needed     INFECTIOUS DISEASE: Monitor off abx/ Abx as per ID    ENDOCRINE: Follow up FS.  Insulin protocol if needed.    HEME: Monitor CBC, DVT prophylaxis, Dimer, Duplex    MUSCULOSKELETAL: Bed rest     Lines:  Ortiz:    DISPO:   CODE STATUS:     IMPRESSION:    DKA  COVID-19 infection  Constipation  HO Hashimoto thyroiditis      PLAN:    NEUROLOGY: Avoid CNS depressants    HEENT: Oral care    CARDIOVASCULAR: Daily weights. Strict I&Os, Keep K>4 and Mg>2    PULMONARY: HOB @ 45 degrees. Aspiration precautions. Keep SpO2 > 94%    GASTROINTESTINAL:   -NPO   -KUB  -Laxatives    RENAL: Monitor BMP. Correct lytes as needed     INFECTIOUS DISEASE: Monitor off abx    ENDOCRINE:   -Trend Lactate, BMP  -DKA protocol  -Fu TSH, Free T4    HEME: Monitor CBC, DVT prophylaxis, Dimer    MUSCULOSKELETAL: Increase as tolerated    DISPO: ICU  CODE STATUS: Full code

## 2023-11-05 NOTE — ED PROVIDER NOTE - DIFFERENTIAL DIAGNOSIS
Differential Diagnosis Electrolyte abnormalities, dehydration, CHANDNI, hyperglycemia, hypoglycemia, symptomatic anemia.  r/o Pneumonia.   r/o cardiac arrythmia,   r/o Viral syndrome.

## 2023-11-05 NOTE — ED ADULT NURSE NOTE - NSICDXPASTMEDICALHX_GEN_ALL_CORE_FT
PAST MEDICAL HISTORY:  DM (diabetes mellitus) type 1 with ketoacidosis     Hashimoto's thyroiditis

## 2023-11-05 NOTE — ED PROVIDER NOTE - CRITICAL CARE ATTENDING CONTRIBUTION TO CARE
I personally evaluated patient. Discussed patient management with PA and patient care supervised directly.  I provided critical care for a total of 40 minutes. I provided a substantive portion of the care and the majority of the critical care time."    Patient is c/o generalized weakness, cough/congestion x one day. Denies travel. +Sob on coughing.   Vitals reviewed.   Lungs: CTA, no wheezing, no crackles.  Abd: +BS, NT, ND, soft,  CNS: awake, alert, o x 3, no focal neurologic deficits.  A/P: Hyperglycemia,   r/o DKA,  URI,   r/o pneumonia,   labs, IVF, CXR,   EKG, reevaluation.

## 2023-11-05 NOTE — ED ADULT NURSE NOTE - OBJECTIVE STATEMENT
Patient Aox3, ambulates with steady gait, complaining of irregular and uncontrolled hyperglycemia. Also complaining of fatigue, cough, and reports covid +

## 2023-11-05 NOTE — ED ADULT NURSE NOTE - NSFALLUNIVINTERV_ED_ALL_ED
Bed/Stretcher in lowest position, wheels locked, appropriate side rails in place/Call bell, personal items and telephone in reach/Instruct patient to call for assistance before getting out of bed/chair/stretcher/Non-slip footwear applied when patient is off stretcher/[place] to call system/Physically safe environment - no spills, clutter or unnecessary equipment/Purposeful proactive rounding/Room/bathroom lighting operational, light cord in reach

## 2023-11-05 NOTE — ED PROVIDER NOTE - CLINICAL SUMMARY MEDICAL DECISION MAKING FREE TEXT BOX
Laboratory results reviewed and discussed with patient. CBC shows no acute drop in H/H, BMP shows hyperglycemia, and lactate is elevated. Troponin is WNL.   Betahydroxybuterate is elevated.   Patient remained hemodynamically stable during the course of ED stay. Discussed with patient about the results of the diagnostic studies. Discussed with admitting physician and ICU MAR, patient is admitted to ICU for further evaluation and care.

## 2023-11-06 ENCOUNTER — TRANSCRIPTION ENCOUNTER (OUTPATIENT)
Age: 23
End: 2023-11-06

## 2023-11-06 LAB
A1C WITH ESTIMATED AVERAGE GLUCOSE RESULT: 10.4 % — HIGH (ref 4–5.6)
A1C WITH ESTIMATED AVERAGE GLUCOSE RESULT: 10.4 % — HIGH (ref 4–5.6)
ALBUMIN SERPL ELPH-MCNC: 4.2 G/DL — SIGNIFICANT CHANGE UP (ref 3.5–5.2)
ALBUMIN SERPL ELPH-MCNC: 4.2 G/DL — SIGNIFICANT CHANGE UP (ref 3.5–5.2)
ALP SERPL-CCNC: 73 U/L — SIGNIFICANT CHANGE UP (ref 30–115)
ALP SERPL-CCNC: 73 U/L — SIGNIFICANT CHANGE UP (ref 30–115)
ALT FLD-CCNC: 7 U/L — SIGNIFICANT CHANGE UP (ref 0–41)
ALT FLD-CCNC: 7 U/L — SIGNIFICANT CHANGE UP (ref 0–41)
ANION GAP SERPL CALC-SCNC: 13 MMOL/L — SIGNIFICANT CHANGE UP (ref 7–14)
ANION GAP SERPL CALC-SCNC: 14 MMOL/L — SIGNIFICANT CHANGE UP (ref 7–14)
ANION GAP SERPL CALC-SCNC: 14 MMOL/L — SIGNIFICANT CHANGE UP (ref 7–14)
APPEARANCE UR: ABNORMAL
APPEARANCE UR: ABNORMAL
APTT BLD: 28.4 SEC — SIGNIFICANT CHANGE UP (ref 27–39.2)
APTT BLD: 28.4 SEC — SIGNIFICANT CHANGE UP (ref 27–39.2)
AST SERPL-CCNC: 11 U/L — SIGNIFICANT CHANGE UP (ref 0–41)
AST SERPL-CCNC: 11 U/L — SIGNIFICANT CHANGE UP (ref 0–41)
BACTERIA # UR AUTO: ABNORMAL /HPF
BACTERIA # UR AUTO: ABNORMAL /HPF
BASOPHILS # BLD AUTO: 0.04 K/UL — SIGNIFICANT CHANGE UP (ref 0–0.2)
BASOPHILS # BLD AUTO: 0.04 K/UL — SIGNIFICANT CHANGE UP (ref 0–0.2)
BASOPHILS NFR BLD AUTO: 0.7 % — SIGNIFICANT CHANGE UP (ref 0–1)
BASOPHILS NFR BLD AUTO: 0.7 % — SIGNIFICANT CHANGE UP (ref 0–1)
BILIRUB SERPL-MCNC: <0.2 MG/DL — SIGNIFICANT CHANGE UP (ref 0.2–1.2)
BILIRUB SERPL-MCNC: <0.2 MG/DL — SIGNIFICANT CHANGE UP (ref 0.2–1.2)
BILIRUB UR-MCNC: NEGATIVE — SIGNIFICANT CHANGE UP
BILIRUB UR-MCNC: NEGATIVE — SIGNIFICANT CHANGE UP
BUN SERPL-MCNC: 11 MG/DL — SIGNIFICANT CHANGE UP (ref 10–20)
BUN SERPL-MCNC: 11 MG/DL — SIGNIFICANT CHANGE UP (ref 10–20)
BUN SERPL-MCNC: 12 MG/DL — SIGNIFICANT CHANGE UP (ref 10–20)
BUN SERPL-MCNC: 12 MG/DL — SIGNIFICANT CHANGE UP (ref 10–20)
BUN SERPL-MCNC: 13 MG/DL — SIGNIFICANT CHANGE UP (ref 10–20)
BUN SERPL-MCNC: 13 MG/DL — SIGNIFICANT CHANGE UP (ref 10–20)
CALCIUM SERPL-MCNC: 8.5 MG/DL — SIGNIFICANT CHANGE UP (ref 8.4–10.5)
CALCIUM SERPL-MCNC: 8.5 MG/DL — SIGNIFICANT CHANGE UP (ref 8.4–10.5)
CALCIUM SERPL-MCNC: 8.8 MG/DL — SIGNIFICANT CHANGE UP (ref 8.4–10.5)
CALCIUM SERPL-MCNC: 8.8 MG/DL — SIGNIFICANT CHANGE UP (ref 8.4–10.5)
CALCIUM SERPL-MCNC: 9.3 MG/DL — SIGNIFICANT CHANGE UP (ref 8.4–10.4)
CALCIUM SERPL-MCNC: 9.3 MG/DL — SIGNIFICANT CHANGE UP (ref 8.4–10.4)
CAST: 0 /LPF — SIGNIFICANT CHANGE UP (ref 0–4)
CAST: 0 /LPF — SIGNIFICANT CHANGE UP (ref 0–4)
CHLORIDE SERPL-SCNC: 104 MMOL/L — SIGNIFICANT CHANGE UP (ref 98–110)
CHLORIDE SERPL-SCNC: 104 MMOL/L — SIGNIFICANT CHANGE UP (ref 98–110)
CHLORIDE SERPL-SCNC: 105 MMOL/L — SIGNIFICANT CHANGE UP (ref 98–110)
CHLORIDE SERPL-SCNC: 105 MMOL/L — SIGNIFICANT CHANGE UP (ref 98–110)
CHLORIDE SERPL-SCNC: 99 MMOL/L — SIGNIFICANT CHANGE UP (ref 98–110)
CHLORIDE SERPL-SCNC: 99 MMOL/L — SIGNIFICANT CHANGE UP (ref 98–110)
CHOLEST SERPL-MCNC: 168 MG/DL — SIGNIFICANT CHANGE UP
CHOLEST SERPL-MCNC: 168 MG/DL — SIGNIFICANT CHANGE UP
CO2 SERPL-SCNC: 16 MMOL/L — LOW (ref 17–32)
CO2 SERPL-SCNC: 16 MMOL/L — LOW (ref 17–32)
CO2 SERPL-SCNC: 18 MMOL/L — SIGNIFICANT CHANGE UP (ref 17–32)
CO2 SERPL-SCNC: 18 MMOL/L — SIGNIFICANT CHANGE UP (ref 17–32)
CO2 SERPL-SCNC: 20 MMOL/L — SIGNIFICANT CHANGE UP (ref 17–32)
CO2 SERPL-SCNC: 20 MMOL/L — SIGNIFICANT CHANGE UP (ref 17–32)
COLOR SPEC: YELLOW — SIGNIFICANT CHANGE UP
COLOR SPEC: YELLOW — SIGNIFICANT CHANGE UP
CREAT SERPL-MCNC: 0.5 MG/DL — LOW (ref 0.7–1.5)
D DIMER BLD IA.RAPID-MCNC: 166 NG/ML DDU — SIGNIFICANT CHANGE UP
D DIMER BLD IA.RAPID-MCNC: 166 NG/ML DDU — SIGNIFICANT CHANGE UP
DIFF PNL FLD: ABNORMAL
DIFF PNL FLD: ABNORMAL
EGFR: 137 ML/MIN/1.73M2 — SIGNIFICANT CHANGE UP
EOSINOPHIL # BLD AUTO: 0.27 K/UL — SIGNIFICANT CHANGE UP (ref 0–0.7)
EOSINOPHIL # BLD AUTO: 0.27 K/UL — SIGNIFICANT CHANGE UP (ref 0–0.7)
EOSINOPHIL NFR BLD AUTO: 4.8 % — SIGNIFICANT CHANGE UP (ref 0–8)
EOSINOPHIL NFR BLD AUTO: 4.8 % — SIGNIFICANT CHANGE UP (ref 0–8)
ESTIMATED AVERAGE GLUCOSE: 252 MG/DL — HIGH (ref 68–114)
ESTIMATED AVERAGE GLUCOSE: 252 MG/DL — HIGH (ref 68–114)
GLUCOSE BLDC GLUCOMTR-MCNC: 133 MG/DL — HIGH (ref 70–99)
GLUCOSE BLDC GLUCOMTR-MCNC: 133 MG/DL — HIGH (ref 70–99)
GLUCOSE BLDC GLUCOMTR-MCNC: 136 MG/DL — HIGH (ref 70–99)
GLUCOSE BLDC GLUCOMTR-MCNC: 136 MG/DL — HIGH (ref 70–99)
GLUCOSE BLDC GLUCOMTR-MCNC: 142 MG/DL — HIGH (ref 70–99)
GLUCOSE BLDC GLUCOMTR-MCNC: 142 MG/DL — HIGH (ref 70–99)
GLUCOSE BLDC GLUCOMTR-MCNC: 171 MG/DL — HIGH (ref 70–99)
GLUCOSE BLDC GLUCOMTR-MCNC: 171 MG/DL — HIGH (ref 70–99)
GLUCOSE BLDC GLUCOMTR-MCNC: 172 MG/DL — HIGH (ref 70–99)
GLUCOSE BLDC GLUCOMTR-MCNC: 172 MG/DL — HIGH (ref 70–99)
GLUCOSE BLDC GLUCOMTR-MCNC: 178 MG/DL — HIGH (ref 70–99)
GLUCOSE BLDC GLUCOMTR-MCNC: 178 MG/DL — HIGH (ref 70–99)
GLUCOSE BLDC GLUCOMTR-MCNC: 182 MG/DL — HIGH (ref 70–99)
GLUCOSE BLDC GLUCOMTR-MCNC: 182 MG/DL — HIGH (ref 70–99)
GLUCOSE BLDC GLUCOMTR-MCNC: 185 MG/DL — HIGH (ref 70–99)
GLUCOSE BLDC GLUCOMTR-MCNC: 185 MG/DL — HIGH (ref 70–99)
GLUCOSE BLDC GLUCOMTR-MCNC: 193 MG/DL — HIGH (ref 70–99)
GLUCOSE BLDC GLUCOMTR-MCNC: 193 MG/DL — HIGH (ref 70–99)
GLUCOSE BLDC GLUCOMTR-MCNC: 198 MG/DL — HIGH (ref 70–99)
GLUCOSE BLDC GLUCOMTR-MCNC: 198 MG/DL — HIGH (ref 70–99)
GLUCOSE BLDC GLUCOMTR-MCNC: 302 MG/DL — HIGH (ref 70–99)
GLUCOSE BLDC GLUCOMTR-MCNC: 302 MG/DL — HIGH (ref 70–99)
GLUCOSE BLDC GLUCOMTR-MCNC: 311 MG/DL — HIGH (ref 70–99)
GLUCOSE BLDC GLUCOMTR-MCNC: 311 MG/DL — HIGH (ref 70–99)
GLUCOSE BLDC GLUCOMTR-MCNC: 370 MG/DL — HIGH (ref 70–99)
GLUCOSE BLDC GLUCOMTR-MCNC: 370 MG/DL — HIGH (ref 70–99)
GLUCOSE SERPL-MCNC: 159 MG/DL — HIGH (ref 70–99)
GLUCOSE SERPL-MCNC: 159 MG/DL — HIGH (ref 70–99)
GLUCOSE SERPL-MCNC: 186 MG/DL — HIGH (ref 70–99)
GLUCOSE SERPL-MCNC: 186 MG/DL — HIGH (ref 70–99)
GLUCOSE SERPL-MCNC: 350 MG/DL — HIGH (ref 70–99)
GLUCOSE SERPL-MCNC: 350 MG/DL — HIGH (ref 70–99)
GLUCOSE UR QL: >=1000 MG/DL
GLUCOSE UR QL: >=1000 MG/DL
HCT VFR BLD CALC: 37.1 % — SIGNIFICANT CHANGE UP (ref 37–47)
HCT VFR BLD CALC: 37.1 % — SIGNIFICANT CHANGE UP (ref 37–47)
HDLC SERPL-MCNC: 44 MG/DL — LOW
HDLC SERPL-MCNC: 44 MG/DL — LOW
HGB BLD-MCNC: 12 G/DL — SIGNIFICANT CHANGE UP (ref 12–16)
HGB BLD-MCNC: 12 G/DL — SIGNIFICANT CHANGE UP (ref 12–16)
IMM GRANULOCYTES NFR BLD AUTO: 0.2 % — SIGNIFICANT CHANGE UP (ref 0.1–0.3)
IMM GRANULOCYTES NFR BLD AUTO: 0.2 % — SIGNIFICANT CHANGE UP (ref 0.1–0.3)
INR BLD: 1.04 RATIO — SIGNIFICANT CHANGE UP (ref 0.65–1.3)
INR BLD: 1.04 RATIO — SIGNIFICANT CHANGE UP (ref 0.65–1.3)
KETONES UR-MCNC: NEGATIVE MG/DL — SIGNIFICANT CHANGE UP
KETONES UR-MCNC: NEGATIVE MG/DL — SIGNIFICANT CHANGE UP
LACTATE SERPL-SCNC: 0.7 MMOL/L — SIGNIFICANT CHANGE UP (ref 0.7–2)
LACTATE SERPL-SCNC: 0.7 MMOL/L — SIGNIFICANT CHANGE UP (ref 0.7–2)
LEUKOCYTE ESTERASE UR-ACNC: ABNORMAL
LEUKOCYTE ESTERASE UR-ACNC: ABNORMAL
LIDOCAIN IGE QN: 24 U/L — SIGNIFICANT CHANGE UP (ref 7–60)
LIDOCAIN IGE QN: 24 U/L — SIGNIFICANT CHANGE UP (ref 7–60)
LIPID PNL WITH DIRECT LDL SERPL: 100 MG/DL — HIGH
LIPID PNL WITH DIRECT LDL SERPL: 100 MG/DL — HIGH
LYMPHOCYTES # BLD AUTO: 1.99 K/UL — SIGNIFICANT CHANGE UP (ref 1.2–3.4)
LYMPHOCYTES # BLD AUTO: 1.99 K/UL — SIGNIFICANT CHANGE UP (ref 1.2–3.4)
LYMPHOCYTES # BLD AUTO: 35.7 % — SIGNIFICANT CHANGE UP (ref 20.5–51.1)
LYMPHOCYTES # BLD AUTO: 35.7 % — SIGNIFICANT CHANGE UP (ref 20.5–51.1)
MAGNESIUM SERPL-MCNC: 1.6 MG/DL — LOW (ref 1.8–2.4)
MAGNESIUM SERPL-MCNC: 1.6 MG/DL — LOW (ref 1.8–2.4)
MCHC RBC-ENTMCNC: 30.5 PG — SIGNIFICANT CHANGE UP (ref 27–31)
MCHC RBC-ENTMCNC: 30.5 PG — SIGNIFICANT CHANGE UP (ref 27–31)
MCHC RBC-ENTMCNC: 32.3 G/DL — SIGNIFICANT CHANGE UP (ref 32–37)
MCHC RBC-ENTMCNC: 32.3 G/DL — SIGNIFICANT CHANGE UP (ref 32–37)
MCV RBC AUTO: 94.4 FL — SIGNIFICANT CHANGE UP (ref 81–99)
MCV RBC AUTO: 94.4 FL — SIGNIFICANT CHANGE UP (ref 81–99)
MONOCYTES # BLD AUTO: 0.69 K/UL — HIGH (ref 0.1–0.6)
MONOCYTES # BLD AUTO: 0.69 K/UL — HIGH (ref 0.1–0.6)
MONOCYTES NFR BLD AUTO: 12.4 % — HIGH (ref 1.7–9.3)
MONOCYTES NFR BLD AUTO: 12.4 % — HIGH (ref 1.7–9.3)
NEUTROPHILS # BLD AUTO: 2.57 K/UL — SIGNIFICANT CHANGE UP (ref 1.4–6.5)
NEUTROPHILS # BLD AUTO: 2.57 K/UL — SIGNIFICANT CHANGE UP (ref 1.4–6.5)
NEUTROPHILS NFR BLD AUTO: 46.2 % — SIGNIFICANT CHANGE UP (ref 42.2–75.2)
NEUTROPHILS NFR BLD AUTO: 46.2 % — SIGNIFICANT CHANGE UP (ref 42.2–75.2)
NITRITE UR-MCNC: POSITIVE
NITRITE UR-MCNC: POSITIVE
NON HDL CHOLESTEROL: 124 MG/DL — SIGNIFICANT CHANGE UP
NON HDL CHOLESTEROL: 124 MG/DL — SIGNIFICANT CHANGE UP
NRBC # BLD: 0 /100 WBCS — SIGNIFICANT CHANGE UP (ref 0–0)
NRBC # BLD: 0 /100 WBCS — SIGNIFICANT CHANGE UP (ref 0–0)
PH UR: 6.5 — SIGNIFICANT CHANGE UP (ref 5–8)
PH UR: 6.5 — SIGNIFICANT CHANGE UP (ref 5–8)
PLATELET # BLD AUTO: 319 K/UL — SIGNIFICANT CHANGE UP (ref 130–400)
PLATELET # BLD AUTO: 319 K/UL — SIGNIFICANT CHANGE UP (ref 130–400)
PMV BLD: 9.6 FL — SIGNIFICANT CHANGE UP (ref 7.4–10.4)
PMV BLD: 9.6 FL — SIGNIFICANT CHANGE UP (ref 7.4–10.4)
POTASSIUM SERPL-MCNC: 4 MMOL/L — SIGNIFICANT CHANGE UP (ref 3.5–5)
POTASSIUM SERPL-MCNC: 4 MMOL/L — SIGNIFICANT CHANGE UP (ref 3.5–5)
POTASSIUM SERPL-MCNC: 4.2 MMOL/L — SIGNIFICANT CHANGE UP (ref 3.5–5)
POTASSIUM SERPL-SCNC: 4 MMOL/L — SIGNIFICANT CHANGE UP (ref 3.5–5)
POTASSIUM SERPL-SCNC: 4 MMOL/L — SIGNIFICANT CHANGE UP (ref 3.5–5)
POTASSIUM SERPL-SCNC: 4.2 MMOL/L — SIGNIFICANT CHANGE UP (ref 3.5–5)
PROT SERPL-MCNC: 6.3 G/DL — SIGNIFICANT CHANGE UP (ref 6–8)
PROT SERPL-MCNC: 6.3 G/DL — SIGNIFICANT CHANGE UP (ref 6–8)
PROT UR-MCNC: NEGATIVE MG/DL — SIGNIFICANT CHANGE UP
PROT UR-MCNC: NEGATIVE MG/DL — SIGNIFICANT CHANGE UP
PROTHROM AB SERPL-ACNC: 11.9 SEC — SIGNIFICANT CHANGE UP (ref 9.95–12.87)
PROTHROM AB SERPL-ACNC: 11.9 SEC — SIGNIFICANT CHANGE UP (ref 9.95–12.87)
RBC # BLD: 3.93 M/UL — LOW (ref 4.2–5.4)
RBC # BLD: 3.93 M/UL — LOW (ref 4.2–5.4)
RBC # FLD: 11.9 % — SIGNIFICANT CHANGE UP (ref 11.5–14.5)
RBC # FLD: 11.9 % — SIGNIFICANT CHANGE UP (ref 11.5–14.5)
RBC CASTS # UR COMP ASSIST: 9 /HPF — HIGH (ref 0–4)
RBC CASTS # UR COMP ASSIST: 9 /HPF — HIGH (ref 0–4)
SODIUM SERPL-SCNC: 132 MMOL/L — LOW (ref 135–146)
SODIUM SERPL-SCNC: 132 MMOL/L — LOW (ref 135–146)
SODIUM SERPL-SCNC: 134 MMOL/L — LOW (ref 135–146)
SODIUM SERPL-SCNC: 134 MMOL/L — LOW (ref 135–146)
SODIUM SERPL-SCNC: 136 MMOL/L — SIGNIFICANT CHANGE UP (ref 135–146)
SODIUM SERPL-SCNC: 136 MMOL/L — SIGNIFICANT CHANGE UP (ref 135–146)
SP GR SPEC: >1.03 — HIGH (ref 1–1.03)
SP GR SPEC: >1.03 — HIGH (ref 1–1.03)
SQUAMOUS # UR AUTO: 1 /HPF — SIGNIFICANT CHANGE UP (ref 0–5)
SQUAMOUS # UR AUTO: 1 /HPF — SIGNIFICANT CHANGE UP (ref 0–5)
T4 FREE SERPL-MCNC: 1.1 NG/DL — SIGNIFICANT CHANGE UP (ref 0.9–1.8)
T4 FREE SERPL-MCNC: 1.1 NG/DL — SIGNIFICANT CHANGE UP (ref 0.9–1.8)
TRIGL SERPL-MCNC: 122 MG/DL — SIGNIFICANT CHANGE UP
TRIGL SERPL-MCNC: 122 MG/DL — SIGNIFICANT CHANGE UP
TSH SERPL-MCNC: 0.77 UIU/ML — SIGNIFICANT CHANGE UP (ref 0.27–4.2)
TSH SERPL-MCNC: 0.77 UIU/ML — SIGNIFICANT CHANGE UP (ref 0.27–4.2)
UROBILINOGEN FLD QL: 1 MG/DL — SIGNIFICANT CHANGE UP (ref 0.2–1)
UROBILINOGEN FLD QL: 1 MG/DL — SIGNIFICANT CHANGE UP (ref 0.2–1)
WBC # BLD: 5.57 K/UL — SIGNIFICANT CHANGE UP (ref 4.8–10.8)
WBC # BLD: 5.57 K/UL — SIGNIFICANT CHANGE UP (ref 4.8–10.8)
WBC # FLD AUTO: 5.57 K/UL — SIGNIFICANT CHANGE UP (ref 4.8–10.8)
WBC # FLD AUTO: 5.57 K/UL — SIGNIFICANT CHANGE UP (ref 4.8–10.8)
WBC UR QL: 547 /HPF — HIGH (ref 0–5)
WBC UR QL: 547 /HPF — HIGH (ref 0–5)

## 2023-11-06 PROCEDURE — 99221 1ST HOSP IP/OBS SF/LOW 40: CPT

## 2023-11-06 PROCEDURE — 74018 RADEX ABDOMEN 1 VIEW: CPT | Mod: 26

## 2023-11-06 PROCEDURE — 99223 1ST HOSP IP/OBS HIGH 75: CPT

## 2023-11-06 RX ORDER — DEXTROSE 50 % IN WATER 50 %
25 SYRINGE (ML) INTRAVENOUS ONCE
Refills: 0 | Status: DISCONTINUED | OUTPATIENT
Start: 2023-11-06 | End: 2023-11-09

## 2023-11-06 RX ORDER — CHLORHEXIDINE GLUCONATE 213 G/1000ML
1 SOLUTION TOPICAL
Refills: 0 | Status: DISCONTINUED | OUTPATIENT
Start: 2023-11-06 | End: 2023-11-09

## 2023-11-06 RX ORDER — GLUCAGON INJECTION, SOLUTION 0.5 MG/.1ML
1 INJECTION, SOLUTION SUBCUTANEOUS ONCE
Refills: 0 | Status: DISCONTINUED | OUTPATIENT
Start: 2023-11-06 | End: 2023-11-09

## 2023-11-06 RX ORDER — INSULIN LISPRO 100/ML
3 VIAL (ML) SUBCUTANEOUS
Refills: 0 | Status: DISCONTINUED | OUTPATIENT
Start: 2023-11-06 | End: 2023-11-06

## 2023-11-06 RX ORDER — INSULIN LISPRO 100/ML
5 VIAL (ML) SUBCUTANEOUS
Refills: 0 | Status: DISCONTINUED | OUTPATIENT
Start: 2023-11-06 | End: 2023-11-08

## 2023-11-06 RX ORDER — INSULIN LISPRO 100/ML
VIAL (ML) SUBCUTANEOUS
Refills: 0 | Status: DISCONTINUED | OUTPATIENT
Start: 2023-11-06 | End: 2023-11-07

## 2023-11-06 RX ORDER — INSULIN GLARGINE 100 [IU]/ML
15 INJECTION, SOLUTION SUBCUTANEOUS ONCE
Refills: 0 | Status: COMPLETED | OUTPATIENT
Start: 2023-11-06 | End: 2023-11-06

## 2023-11-06 RX ORDER — SODIUM CHLORIDE 9 MG/ML
1000 INJECTION, SOLUTION INTRAVENOUS
Refills: 0 | Status: DISCONTINUED | OUTPATIENT
Start: 2023-11-06 | End: 2023-11-09

## 2023-11-06 RX ORDER — DEXTROSE 50 % IN WATER 50 %
15 SYRINGE (ML) INTRAVENOUS ONCE
Refills: 0 | Status: DISCONTINUED | OUTPATIENT
Start: 2023-11-06 | End: 2023-11-09

## 2023-11-06 RX ORDER — INSULIN LISPRO 100/ML
VIAL (ML) SUBCUTANEOUS
Refills: 0 | Status: DISCONTINUED | OUTPATIENT
Start: 2023-11-06 | End: 2023-11-06

## 2023-11-06 RX ORDER — IBUPROFEN 200 MG
400 TABLET ORAL ONCE
Refills: 0 | Status: COMPLETED | OUTPATIENT
Start: 2023-11-06 | End: 2023-11-06

## 2023-11-06 RX ORDER — INSULIN GLARGINE 100 [IU]/ML
22 INJECTION, SOLUTION SUBCUTANEOUS EVERY MORNING
Refills: 0 | Status: DISCONTINUED | OUTPATIENT
Start: 2023-11-06 | End: 2023-11-08

## 2023-11-06 RX ORDER — MAGNESIUM SULFATE 500 MG/ML
2 VIAL (ML) INJECTION
Refills: 0 | Status: COMPLETED | OUTPATIENT
Start: 2023-11-06 | End: 2023-11-06

## 2023-11-06 RX ORDER — DEXTROSE MONOHYDRATE, SODIUM CHLORIDE, AND POTASSIUM CHLORIDE 50; .745; 4.5 G/1000ML; G/1000ML; G/1000ML
1000 INJECTION, SOLUTION INTRAVENOUS
Refills: 0 | Status: DISCONTINUED | OUTPATIENT
Start: 2023-11-06 | End: 2023-11-06

## 2023-11-06 RX ORDER — INSULIN LISPRO 100/ML
VIAL (ML) SUBCUTANEOUS AT BEDTIME
Refills: 0 | Status: DISCONTINUED | OUTPATIENT
Start: 2023-11-06 | End: 2023-11-06

## 2023-11-06 RX ORDER — DEXTROSE 50 % IN WATER 50 %
12.5 SYRINGE (ML) INTRAVENOUS ONCE
Refills: 0 | Status: DISCONTINUED | OUTPATIENT
Start: 2023-11-06 | End: 2023-11-09

## 2023-11-06 RX ORDER — INSULIN HUMAN 100 [IU]/ML
1 INJECTION, SOLUTION SUBCUTANEOUS
Qty: 100 | Refills: 0 | Status: DISCONTINUED | OUTPATIENT
Start: 2023-11-06 | End: 2023-11-06

## 2023-11-06 RX ORDER — INSULIN LISPRO 100/ML
5 VIAL (ML) SUBCUTANEOUS
Refills: 0 | Status: DISCONTINUED | OUTPATIENT
Start: 2023-11-06 | End: 2023-11-06

## 2023-11-06 RX ORDER — INSULIN GLARGINE 100 [IU]/ML
7 INJECTION, SOLUTION SUBCUTANEOUS
Refills: 0 | Status: DISCONTINUED | OUTPATIENT
Start: 2023-11-06 | End: 2023-11-06

## 2023-11-06 RX ORDER — INSULIN GLARGINE 100 [IU]/ML
22 INJECTION, SOLUTION SUBCUTANEOUS EVERY MORNING
Refills: 0 | Status: DISCONTINUED | OUTPATIENT
Start: 2023-11-06 | End: 2023-11-06

## 2023-11-06 RX ORDER — INSULIN LISPRO 100/ML
5 VIAL (ML) SUBCUTANEOUS AT BEDTIME
Refills: 0 | Status: DISCONTINUED | OUTPATIENT
Start: 2023-11-06 | End: 2023-11-06

## 2023-11-06 RX ADMIN — Medication 400 MILLIGRAM(S): at 09:47

## 2023-11-06 RX ADMIN — Medication 5 UNIT(S): at 23:08

## 2023-11-06 RX ADMIN — Medication 5 UNIT(S): at 16:24

## 2023-11-06 RX ADMIN — Medication 25 GRAM(S): at 11:42

## 2023-11-06 RX ADMIN — Medication 3 UNIT(S): at 12:00

## 2023-11-06 RX ADMIN — Medication 10: at 22:36

## 2023-11-06 RX ADMIN — Medication 8: at 12:01

## 2023-11-06 RX ADMIN — INSULIN GLARGINE 15 UNIT(S): 100 INJECTION, SOLUTION SUBCUTANEOUS at 16:54

## 2023-11-06 RX ADMIN — Medication 400 MILLIGRAM(S): at 11:41

## 2023-11-06 RX ADMIN — INSULIN GLARGINE 15 UNIT(S): 100 INJECTION, SOLUTION SUBCUTANEOUS at 10:05

## 2023-11-06 RX ADMIN — Medication 25 GRAM(S): at 09:48

## 2023-11-06 NOTE — DISCHARGE NOTE PROVIDER - HOSPITAL COURSE
23 year old female with pmhx of DM type 1 (Uncontrolled, follows Dr. Cornelius) and Hashimoto thyroiditis (Controlled) presents to ed with cough, myalgias, and hyperglycemia x 2 days. She took a covid test at home and it was positive. Pt states her glucose has been elevated in the 400s, has been giving herself more insulin which is still NOT controlling her blood sugar. Pt denies nausea, vomiting, diarrhea, abd pain, chest pain, calf pain or swelling, or recent travel/sick contacts.    Endorses constipation, No BM since 1-2 weeks.    In the ED, vitals were normal, BHB +ve, admitted to ICU for DKA.    - Patient admitted to MICU for DKA, most recent anion gap is closed at 13, Na 132, K 4.2, glucose 136 at AMA  - Was on insulin drip but now transitioned to subcutaneous insulin lantus  - Patient tolerating PO  - Endocrine saw patient and recs were:  	# uncontrolled type 1 DM / COVID +   	-A1c 10.4% , on toujeo 30 units daily in am and Afrezza 12 units TIDAC  	-now off insulin drip and AG closed   	-continue with IV fluid   	-increase toujeo to 22 units in am   	-continue lispro 5 units TIDAC , and sliding scale  	-will follow , discussed with mom switching from Afrezza to lispro SC but patient prefer to stay on Afrezza now   	-she will follow up with Dr Frank on discharge   -KUB on 11/6 for constipation pending at this time. Patient refused Miralax.     On the evening of 11/6, patient wished to leave AMA. Patient was informed that she was not medically cleared yet and by leaving before medically stable her condition may worsen, and may include death. Patient understood and acknowledge the risk with leaving AMA and signed AMA documents. Patient discharged AMA. 23 year old female with pmhx of DM type 1 (Uncontrolled, follows Dr. Cornelius) and Hashimoto thyroiditis (Controlled) presents to ed with cough, myalgias, and hyperglycemia x 2 days. She took a covid test at home and it was positive. Pt states her glucose has been elevated in the 400s, has been giving herself more insulin which is still NOT controlling her blood sugar. Pt denies nausea, vomiting, diarrhea, abd pain, chest pain, calf pain or swelling, or recent travel/sick contacts.    Endorses constipation, No BM since 1-2 weeks.    In the ED, vitals were normal, BHB +ve, admitted to ICU for DKA.    - Patient admitted to MICU for DKA, most recent anion gap is closed at 13, Na 132, K 4.2, glucose 136 at AMA  - Was on insulin drip but now transitioned to subcutaneous insulin lantus  - Patient tolerating PO  - Endocrine saw patient and recs were:  	# uncontrolled type 1 DM / COVID +   	-A1c 10.4% , on toujeo 30 units daily in am and Afrezza 12 units TIDAC  	-now off insulin drip and AG closed   	-continue with IV fluid   	-increase toujeo to 22 units in am   	-continue lispro 5 units TIDAC , and sliding scale  	-will follow , discussed with mom switching from Afrezza to lispro SC but patient prefer to stay on Afrezza now   	-she will follow up with Dr Frank on discharge   -KUB on 11/6 for constipation pending at this time. Patient refused Miralax.   -UA came back positive and patient endorsed dysuria. Started on Rocephin 3 day course. 23 year old female with pmhx of DM type 1 (Uncontrolled, follows Dr. Cornelius) and Hashimoto thyroiditis (Controlled) presents to ed with cough, myalgias, and hyperglycemia x 2 days. She took a covid test at home and it was positive. Pt states her glucose has been elevated in the 400s, has been giving herself more insulin which is still NOT controlling her blood sugar. Pt denies nausea, vomiting, diarrhea, abd pain, chest pain, calf pain or swelling, or recent travel/sick contacts.    Endorses constipation, No BM since 1-2 weeks.    In the ED, vitals were normal, BHB +ve, admitted to ICU for DKA.    - Patient admitted to MICU for DKA, most recent anion gap is closed at 13, Na 132, K 4.2, glucose 136 at AMA  - Was on insulin drip but now transitioned to subcutaneous insulin lantus  - Patient tolerating PO  - Endocrine saw patient and recs were:  	# uncontrolled type 1 DM / COVID +   	-A1c 10.4% , on toujeo 30 units daily in am and Afrezza 12 units TIDAC  	-now off insulin drip and AG closed   	-continue with IV fluid   	-increase toujeo to 22 units in am   	-continue lispro 5 units TIDAC , and sliding scale  	-will follow , discussed with mom switching from Afrezza to lispro SC but patient prefer to stay on Afrezza now   	-she will follow up with Dr Frank on discharge   -KUB on 11/6 for constipation pending at this time. Patient refused Miralax.   -UA came back positive and patient endorsed dysuria. Started on Rocephin 3 day course.     Patient's LFT was found to be elevated and the Rocephin was changed to Nitrofurantoin since 11/08. Continue to complete the dose for 3 days          23 year old female with pmhx of DM type 1 (Uncontrolled, follows Dr. Cornelius) and Hashimoto thyroiditis (Controlled) presents to ed with cough, myalgias, and hyperglycemia x 2 days. She took a covid test at home and it was positive. Pt states her glucose has been elevated in the 400s, has been giving herself more insulin which is still NOT controlling her blood sugar. Pt denies nausea, vomiting, diarrhea, abd pain, chest pain, calf pain or swelling, or recent travel/sick contacts.    Endorses constipation, No BM since 1-2 weeks.    In the ED, vitals were normal, BHB +ve, admitted to ICU for DKA.    - Patient admitted to MICU for DKA, most recent anion gap is closed at 13, Na 132, K 4.2, glucose 136 at AMA  - Was on insulin drip but now transitioned to subcutaneous insulin lantus  - Patient tolerating PO  - Endocrine saw patient and recs were:  	# uncontrolled type 1 DM / COVID +   	-A1c 10.4% , on toujeo 30 units daily in am and Afrezza 12 units TIDAC  	-now off insulin drip and AG closed   	-continue with IV fluid   	-increase toujeo to 22 units in am   	-continue lispro 5 units TIDAC , and sliding scale  	-will follow , discussed with mom switching from Afrezza to lispro SC but patient prefer to stay on Afrezza now   	-she will follow up with Dr Frank on discharge   -KUB on 11/6 for constipation pending at this time. Patient refused Miralax.   -UA came back positive and patient endorsed dysuria. Started on Rocephin 3 day course.     Patient's LFT was found to be elevated and the Rocephin was changed to Nitrofurantoin since 11/08. Continue to complete the dose for 3 days. Patient to follow with hepatology in the outpatient.

## 2023-11-06 NOTE — DISCHARGE NOTE PROVIDER - PROVIDER TOKENS
PROVIDER:[TOKEN:[[id number]],FOLLOWUP:[2 weeks],ESTABLISHEDPATIENT:[T]],PROVIDER:[TOKEN:[[id number]],FOLLOWUP:[2 weeks]] PROVIDER:[TOKEN:[[id number]],FOLLOWUP:[2 weeks],ESTABLISHEDPATIENT:[T]],PROVIDER:[TOKEN:[[id number]],FOLLOWUP:[2 weeks]],PROVIDER:[TOKEN:[[id number]],FOLLOWUP:[1 week]]

## 2023-11-06 NOTE — DISCHARGE NOTE PROVIDER - NSDCCPCAREPLAN_GEN_ALL_CORE_FT
PRINCIPAL DISCHARGE DIAGNOSIS  Diagnosis: DKA, type 1  Assessment and Plan of Treatment: You came to the hospital with weakness and cough. You're blood sugar was elevated and you were in DKA. You were admitted and treated for DKA with IV insulin, fluids, and electrolytes management. Endocrine saw you and recommended switching from Afrezza to lispro SC but you prefered to stay on Afrezza for now. On 11/6, you requested to leave the hospital. Your condition improved, but you were not medically cleared for discharge. You were informed about the risk of leaving against medical advice, which included worsening of your condition and potentially even death. You understood the risk and signed out AMA. Please continue to take all of your medications as prescribed and follow up with your PCP and endocrinologist.   Call your local emergency number (911 in the [place]) or have someone call if:  You have a seizure.  You begin to breathe fast, or are short of breath.  You become weak and confused.  When should I seek immediate care?  You are more drowsy than usual.  When should I call my diabetes care team provider?  You have fruity, sweet breath.  You have severe, new stomach pain and are vomiting.  Your blood sugar level is lower or higher than your healthcare provider says it should be.  You have ketones in your blood or urine.  You have a fever or chills.  You are more thirsty than usual.  You are urinating more often than usual.  You have questions or concerns about your condition or care.        SECONDARY DISCHARGE DIAGNOSES  Diagnosis: COVID  Assessment and Plan of Treatment: While in the hospital, you tested positive for COVID. Your COVID is stable at this time.   To help manage symptoms of COVID-19, try the following tips.  Rest and drink plenty of fluids.  Acetaminophen (Tylenol) and ibuprofen (Advil, Motrin) help reduce fever. Sometimes, providers advise you to use both types of medicine. Take the recommended amount to reduce fever. DO NOT use ibuprofen in children 6 months or younger.  Aspirin works well to treat fever in adults. DO NOT give aspirin to a child (under age 18 years) unless your child's provider tells you to.  A lukewarm bath or sponge bath may help cool a fever. Keep taking medicine -- otherwise your temperature might go back up.  For a sore throat, gargle several times a day with warm salt water (1/2 tsp or 3 grams of salt in 1 cup or 240 milliliters of water). Drink warm liquids such as tea, or lemon tea with honey. Suck on hard candies or throat lozenges.  Use a vaporizer or take a steamy shower to increase moisture in the air, reduce nasal congestion, and help soothe a dry throat and cough.  Saline spray can also help reduce nasal congestion.  To help relieve diarrhea, drink 8 to 10 glasses of clear liquids, such as water, diluted fruit juices, and clear soups to make up for fluid loss. Avoid dairy products, fried foods, caffeine, alcohol, and carbonated drinks.  If you have nausea, eat small meals with bland foods. Avoid foods with strong smells. Try to drink 8 to 10 glasses of water or clear fluids every day to stay hydrated.  Do not smoke, and stay away from secondhand smoke.  When to Call the Doctor  You should contact your provider if your symptoms are getting worse.  Call 911 or your local emergency number if you have:  Trouble breathing  Chest pain or pressure  Confusion or inability to wake up  Bluish, gray, or pale discoloration of lips or face  Seizures  Slurred speech  Weakness or numbness in a limb or one side of the face  Swelling of the legs or arms  Any other symptoms that are severe or concern you     PRINCIPAL DISCHARGE DIAGNOSIS  Diagnosis: DKA, type 1  Assessment and Plan of Treatment: You came to the hospital with weakness and cough. You're blood sugar was elevated and you were in DKA. You were admitted and treated for DKA with IV insulin, fluids, and electrolytes management. Endocrine saw you and recommended switching from Afrezza to lispro SC but you prefered to stay on Afrezza for now. Your DKA resolved and you are medically cleared for discharge. Please continue to take all of your medications as prescribed and follow up with your PCP and endocrinologist.   Call your local emergency number (911 in the [place]) or have someone call if:  You have a seizure.  You begin to breathe fast, or are short of breath.  You become weak and confused.  When should I seek immediate care?  You are more drowsy than usual.  When should I call my diabetes care team provider?  You have fruity, sweet breath.  You have severe, new stomach pain and are vomiting.  Your blood sugar level is lower or higher than your healthcare provider says it should be.  You have ketones in your blood or urine.  You have a fever or chills.  You are more thirsty than usual.  You are urinating more often than usual.  You have questions or concerns about your condition or care.        SECONDARY DISCHARGE DIAGNOSES  Diagnosis: COVID  Assessment and Plan of Treatment: While in the hospital, you tested positive for COVID. Your COVID is stable at this time.   To help manage symptoms of COVID-19, try the following tips.  Rest and drink plenty of fluids.  Acetaminophen (Tylenol) and ibuprofen (Advil, Motrin) help reduce fever. Sometimes, providers advise you to use both types of medicine. Take the recommended amount to reduce fever. DO NOT use ibuprofen in children 6 months or younger.  Aspirin works well to treat fever in adults. DO NOT give aspirin to a child (under age 18 years) unless your child's provider tells you to.  A lukewarm bath or sponge bath may help cool a fever. Keep taking medicine -- otherwise your temperature might go back up.  For a sore throat, gargle several times a day with warm salt water (1/2 tsp or 3 grams of salt in 1 cup or 240 milliliters of water). Drink warm liquids such as tea, or lemon tea with honey. Suck on hard candies or throat lozenges.  Use a vaporizer or take a steamy shower to increase moisture in the air, reduce nasal congestion, and help soothe a dry throat and cough.  Saline spray can also help reduce nasal congestion.  To help relieve diarrhea, drink 8 to 10 glasses of clear liquids, such as water, diluted fruit juices, and clear soups to make up for fluid loss. Avoid dairy products, fried foods, caffeine, alcohol, and carbonated drinks.  If you have nausea, eat small meals with bland foods. Avoid foods with strong smells. Try to drink 8 to 10 glasses of water or clear fluids every day to stay hydrated.  Do not smoke, and stay away from secondhand smoke.  When to Call the Doctor  You should contact your provider if your symptoms are getting worse.  Call 911 or your local emergency number if you have:  Trouble breathing  Chest pain or pressure  Confusion or inability to wake up  Bluish, gray, or pale discoloration of lips or face  Seizures  Slurred speech  Weakness or numbness in a limb or one side of the face  Swelling of the legs or arms  Any other symptoms that are severe or concern you     PRINCIPAL DISCHARGE DIAGNOSIS  Diagnosis: DKA, type 1  Assessment and Plan of Treatment: You came to the hospital with weakness and cough. You're blood sugar was elevated and you were in DKA. You were admitted and treated for DKA with IV insulin, fluids, and electrolytes management. Endocrine saw you and recommended switching from Afrezza to lispro SC but you prefered to stay on Afrezza for now. Your DKA resolved and you are medically cleared for discharge. Please continue to take all of your medications as prescribed and follow up with your PCP and endocrinologist.   Call your local emergency number (911 in the [place]) or have someone call if:  You have a seizure.  You begin to breathe fast, or are short of breath.  You become weak and confused.  When should I seek immediate care?  You are more drowsy than usual.  When should I call my diabetes care team provider?  You have fruity, sweet breath.  You have severe, new stomach pain and are vomiting.  Your blood sugar level is lower or higher than your healthcare provider says it should be.  You have ketones in your blood or urine.  You have a fever or chills.  You are more thirsty than usual.  You are urinating more often than usual.  You have questions or concerns about your condition or care.        SECONDARY DISCHARGE DIAGNOSES  Diagnosis: COVID  Assessment and Plan of Treatment: While in the hospital, you tested positive for COVID. Your COVID is stable at this time.   To help manage symptoms of COVID-19, try the following tips.  Rest and drink plenty of fluids.  Acetaminophen (Tylenol) and ibuprofen (Advil, Motrin) help reduce fever. Sometimes, providers advise you to use both types of medicine. Take the recommended amount to reduce fever. DO NOT use ibuprofen in children 6 months or younger.  Aspirin works well to treat fever in adults. DO NOT give aspirin to a child (under age 18 years) unless your child's provider tells you to.  A lukewarm bath or sponge bath may help cool a fever. Keep taking medicine -- otherwise your temperature might go back up.  For a sore throat, gargle several times a day with warm salt water (1/2 tsp or 3 grams of salt in 1 cup or 240 milliliters of water). Drink warm liquids such as tea, or lemon tea with honey. Suck on hard candies or throat lozenges.  Use a vaporizer or take a steamy shower to increase moisture in the air, reduce nasal congestion, and help soothe a dry throat and cough.  Saline spray can also help reduce nasal congestion.  To help relieve diarrhea, drink 8 to 10 glasses of clear liquids, such as water, diluted fruit juices, and clear soups to make up for fluid loss. Avoid dairy products, fried foods, caffeine, alcohol, and carbonated drinks.  If you have nausea, eat small meals with bland foods. Avoid foods with strong smells. Try to drink 8 to 10 glasses of water or clear fluids every day to stay hydrated.  Do not smoke, and stay away from secondhand smoke.  When to Call the Doctor  You should contact your provider if your symptoms are getting worse.  Call 911 or your local emergency number if you have:  Trouble breathing  Chest pain or pressure  Confusion or inability to wake up  Bluish, gray, or pale discoloration of lips or face  Seizures  Slurred speech  Weakness or numbness in a limb or one side of the face  Swelling of the legs or arms  Any other symptoms that are severe or concern you    Diagnosis: Abnormal LFTs  Assessment and Plan of Treatment: Your liver functions were increased. The ultrasound of the abdomen was done which was normal  LFT seemed to be increased due to drug reaction   Please follow up in the hepatology clinic for monitoring of the liver enzymes     PRINCIPAL DISCHARGE DIAGNOSIS  Diagnosis: DKA, type 1  Assessment and Plan of Treatment: You came to the hospital with weakness and cough. You're blood sugar was elevated and you were in DKA. You were admitted and treated for DKA with IV insulin, fluids, and electrolytes management. Endocrine saw you and recommended switching from Afrezza to lispro SC but you prefered to stay on Afrezza for now. Your DKA resolved and you are medically cleared for discharge. Please continue to take all of your medications as prescribed and follow up with your PCP and endocrinologist.   You are discharged on Toujeo 22 units once a day and afrezza (dose based on the what you are eating )   You have a appointment with Dr. Frank , please follow up   Call your local emergency number (911 in the [place]) or have someone call if:  You have a seizure.  You begin to breathe fast, or are short of breath.  You become weak and confused.  When should I seek immediate care?  You are more drowsy than usual.  When should I call my diabetes care team provider?  You have fruity, sweet breath.  You have severe, new stomach pain and are vomiting.  Your blood sugar level is lower or higher than your healthcare provider says it should be.  You have ketones in your blood or urine.  You have a fever or chills.  You are more thirsty than usual.  You are urinating more often than usual.  You have questions or concerns about your condition or care.        SECONDARY DISCHARGE DIAGNOSES  Diagnosis: COVID  Assessment and Plan of Treatment: While in the hospital, you tested positive for COVID. Your COVID is stable at this time.   To help manage symptoms of COVID-19, try the following tips.  Rest and drink plenty of fluids.  Acetaminophen (Tylenol) and ibuprofen (Advil, Motrin) help reduce fever. Sometimes, providers advise you to use both types of medicine. Take the recommended amount to reduce fever. DO NOT use ibuprofen in children 6 months or younger.  Aspirin works well to treat fever in adults. DO NOT give aspirin to a child (under age 18 years) unless your child's provider tells you to.  A lukewarm bath or sponge bath may help cool a fever. Keep taking medicine -- otherwise your temperature might go back up.  For a sore throat, gargle several times a day with warm salt water (1/2 tsp or 3 grams of salt in 1 cup or 240 milliliters of water). Drink warm liquids such as tea, or lemon tea with honey. Suck on hard candies or throat lozenges.  Use a vaporizer or take a steamy shower to increase moisture in the air, reduce nasal congestion, and help soothe a dry throat and cough.  Saline spray can also help reduce nasal congestion.  To help relieve diarrhea, drink 8 to 10 glasses of clear liquids, such as water, diluted fruit juices, and clear soups to make up for fluid loss. Avoid dairy products, fried foods, caffeine, alcohol, and carbonated drinks.  If you have nausea, eat small meals with bland foods. Avoid foods with strong smells. Try to drink 8 to 10 glasses of water or clear fluids every day to stay hydrated.  Do not smoke, and stay away from secondhand smoke.  When to Call the Doctor  You should contact your provider if your symptoms are getting worse.  Call 911 or your local emergency number if you have:  Trouble breathing  Chest pain or pressure  Confusion or inability to wake up  Bluish, gray, or pale discoloration of lips or face  Seizures  Slurred speech  Weakness or numbness in a limb or one side of the face  Swelling of the legs or arms  Any other symptoms that are severe or concern you    Diagnosis: Abnormal LFTs  Assessment and Plan of Treatment: Your liver functions were increased. The ultrasound of the abdomen was done which was normal  LFT seemed to be increased due to drug reaction   Please follow up in the hepatology clinic for monitoring of the liver enzymes

## 2023-11-06 NOTE — DISCHARGE NOTE PROVIDER - NSDCFUADDINST_GEN_ALL_CORE_FT
Please take the medications as prescribed   Please follow up with your PCP, Endocrine ( Dr Frank ) and Hepatology ( Dr. Pond )

## 2023-11-06 NOTE — DISCHARGE NOTE PROVIDER - CARE PROVIDER_API CALL
Misti Leahy  Pediatrics  [address], [address]  [place] [address]  Phone: [phone]  Fax: [phone]  Established Patient  Follow Up Time: 2 weeks    Hannah Frank  Endocrinology/Metab/Diabetes  [address] [place] [address]  Phone: [phone]  Fax: [phone]  Follow Up Time: 2 weeks   Misti Leahy  Pediatrics  [address], [address]  [place] [address]  Phone: [phone]  Fax: [phone]  Established Patient  Follow Up Time: 2 weeks    Hannah Frank  Endocrinology/Metab/Diabetes  [address] [place] [address]  Phone: [phone]  Fax: [phone]  Follow Up Time: 2 weeks    Raquel Pond  Internal Medicine  [address] [place] [address]  Phone: [phone]  Fax: [phone]  Follow Up Time: 1 week

## 2023-11-06 NOTE — CHART NOTE - [id number]_[id number]_CORE
MICU DOWN GRADE NOTE    HPI: 23 year old female with pmhx of DM type 1 (Uncontrolled, follows Dr. Cornelius) and Hashimoto thyroiditis (Controlled) presents to ed with cough, myalgias, and hyperglycemia x 2 days. She took a covid test at home and it was positive. Pt states her glucose has been elevated in the 400s, has been giving herself more insulin which is still NOT controlling her blood sugar. Pt denies nausea, vomiting, diarrhea, abd pain, chest pain, calf pain or swelling, or recent travel/sick contacts.    INTERVAL HPI/OVERNIGHT EVENTS:  - Patient admitted to MICU for DKA, most recent anion gap is closed at 13, Na 132, K 4.2, glucose 350  - Was on 1 unit insulin drip but now discontinued, transitioned to subcutaneous insulin lantus  - Patient tolerating PO    Follow up:  - Blood glucose level  - BMP        REVIEW OF SYSTEMS:  CONSTITUTIONAL: No fever, chills  HEENT:  No blurry vision No sinus or throat pain  NECK: No pain or stiffness  RESPIRATORY: No cough, wheezing, chills or hemoptysis; No shortness of breath  CARDIOVASCULAR: No chest pain, palpitations  GASTROINTESTINAL: No abdominal pain. No nausea, vomiting, or diarrhea  GENITOURINARY: No dysuria  NEUROLOGICAL: No HA, No focal weakness  SKIN: No itching, burning, rashes, or lesions   MUSCULOSKELETAL: No joint pain or swelling; No muscle, back, or extremity pain    MEDICATIONS:  chlorhexidine 2% Cloths 1 Application(s) Topical <User Schedule>  dextrose 5% + sodium chloride 0.45% with potassium chloride 20 mEq/L 1000 milliLiter(s) IV Continuous <Continuous>  dextrose 5%. 1000 milliLiter(s) IV Continuous <Continuous>  dextrose 5%. 1000 milliLiter(s) IV Continuous <Continuous>  dextrose 5%. 1000 milliLiter(s) IV Continuous <Continuous>  dextrose 5%. 1000 milliLiter(s) IV Continuous <Continuous>  dextrose 50% Injectable 25 Gram(s) IV Push once  dextrose 50% Injectable 12.5 Gram(s) IV Push once  dextrose 50% Injectable 25 Gram(s) IV Push once  dextrose 50% Injectable 25 Gram(s) IV Push once  dextrose 50% Injectable 12.5 Gram(s) IV Push once  dextrose 50% Injectable 25 Gram(s) IV Push once  dextrose Oral Gel 15 Gram(s) Oral once PRN  dextrose Oral Gel 15 Gram(s) Oral once PRN  enoxaparin Injectable 40 milliGRAM(s) SubCutaneous every 24 hours  glucagon  Injectable 1 milliGRAM(s) IntraMuscular once  glucagon  Injectable 1 milliGRAM(s) IntraMuscular once  influenza   Vaccine 0.5 milliLiter(s) IntraMuscular once  insulin lispro (ADMELOG) corrective regimen sliding scale   SubCutaneous three times a day before meals  insulin regular Infusion 1 Unit(s)/Hr IV Continuous <Continuous>  polyethylene glycol 3350 17 Gram(s) Oral every 12 hours  senna 2 Tablet(s) Oral at bedtime      T(C): 36.6 (11-06-23 @ 12:00), Max: 36.8 (11-05-23 @ 18:12)  HR: 77 (11-06-23 @ 14:00) (77 - 112)  BP: 106/55 (11-06-23 @ 12:00) (89/54 - 132/62)  RR: 35 (11-06-23 @ 10:00) (18 - 45)  SpO2: 98% (11-06-23 @ 14:00) (96% - 100%)  Wt(kg): --Vital Signs Last 24 Hrs  T(C): 36.6 (06 Nov 2023 12:00), Max: 36.8 (05 Nov 2023 18:12)  T(F): 97.8 (06 Nov 2023 12:00), Max: 98.2 (05 Nov 2023 18:12)  HR: 77 (06 Nov 2023 14:00) (77 - 112)  BP: 106/55 (06 Nov 2023 12:00) (89/54 - 132/62)  BP(mean): 72 (06 Nov 2023 12:00) (65 - 77)  RR: 35 (06 Nov 2023 10:00) (18 - 45)  SpO2: 98% (06 Nov 2023 14:00) (96% - 100%)    Parameters below as of 06 Nov 2023 14:00  Patient On (Oxygen Delivery Method): room air        PHYSICAL EXAM:  GENERAL: NAD, well-groomed, well-developed  HEAD:  Atraumatic, Normocephalic  EYES: EOMI, PERRLA, conjunctiva and sclera clear  ENMT:  Moist mucous membranes  NECK: Supple, No JVD,  CHEST/LUNG: Clear to auscultation bilaterally; No rales, rhonchi, wheezing, or rubs  HEART: Regular rate and rhythm; No murmurs, rubs, or gallops  ABDOMEN: Soft, Nontender, Nondistended; Bowel sounds present  NEURO: Alert & Oriented X3  EXTREMITIES: No LE edema, no calf tenderness  LYMPH: No lymphadenopathy noted  SKIN: No rashes or lesions    Consultant(s) Notes Reviewed:  [x ] YES  [ ] NO  Care Discussed with Consultants/Other Providers [ x] YES  [ ] NO    LABS:                        12.0   5.57  )-----------( 319      ( 06 Nov 2023 04:52 )             37.1     11-06    132<L>  |  99  |  11  ----------------------------<  350<H>  4.2   |  20  |  0.5<L>    Ca    8.5      06 Nov 2023 11:39  Mg     1.6     11-06    TPro  6.3  /  Alb  4.2  /  TBili  <0.2  /  DBili  x   /  AST  11  /  ALT  7   /  AlkPhos  73  11-06    PT/INR - ( 06 Nov 2023 04:52 )   PT: 11.90 sec;   INR: 1.04 ratio         PTT - ( 06 Nov 2023 04:52 )  PTT:28.4 sec  Urinalysis Basic - ( 06 Nov 2023 11:39 )    Color: x / Appearance: x / SG: x / pH: x  Gluc: 350 mg/dL / Ketone: x  / Bili: x / Urobili: x   Blood: x / Protein: x / Nitrite: x   Leuk Esterase: x / RBC: x / WBC x   Sq Epi: x / Non Sq Epi: x / Bacteria: x      CAPILLARY BLOOD GLUCOSE      POCT Blood Glucose.: 311 mg/dL (06 Nov 2023 11:59)  POCT Blood Glucose.: 302 mg/dL (06 Nov 2023 09:28)  POCT Blood Glucose.: 182 mg/dL (06 Nov 2023 08:29)  POCT Blood Glucose.: 193 mg/dL (06 Nov 2023 07:29)  POCT Blood Glucose.: 171 mg/dL (06 Nov 2023 06:22)  POCT Blood Glucose.: 133 mg/dL (06 Nov 2023 05:13)  POCT Blood Glucose.: 142 mg/dL (06 Nov 2023 04:11)  POCT Blood Glucose.: 172 mg/dL (06 Nov 2023 03:00)  POCT Blood Glucose.: 185 mg/dL (06 Nov 2023 02:04)  POCT Blood Glucose.: 178 mg/dL (06 Nov 2023 01:09)  POCT Blood Glucose.: 198 mg/dL (06 Nov 2023 00:05)  POCT Blood Glucose.: 258 mg/dL (05 Nov 2023 23:13)  POCT Blood Glucose.: 360 mg/dL (05 Nov 2023 21:34)        Urinalysis Basic - ( 06 Nov 2023 11:39 )    Color: x / Appearance: x / SG: x / pH: x  Gluc: 350 mg/dL / Ketone: x  / Bili: x / Urobili: x   Blood: x / Protein: x / Nitrite: x   Leuk Esterase: x / RBC: x / WBC x   Sq Epi: x / Non Sq Epi: x / Bacteria: x        RADIOLOGY & ADDITIONAL TESTS:    Imaging Personally Reviewed:  [x ] YES  [ ] NO MICU DOWN GRADE NOTE    HPI: 23 year old female with pmhx of DM type 1 (Uncontrolled, follows Dr. Cornelius) and Hashimoto thyroiditis (Controlled) presents to ed with cough, myalgias, and hyperglycemia x 2 days. She took a covid test at home and it was positive. Pt states her glucose has been elevated in the 400s, has been giving herself more insulin which is still NOT controlling her blood sugar. Pt denies nausea, vomiting, diarrhea, abd pain, chest pain, calf pain or swelling, or recent travel/sick contacts.    INTERVAL HPI/OVERNIGHT EVENTS:  - Patient admitted to MICU for DKA, most recent anion gap is closed at 13, Na 132, K 4.2, glucose 350  - Was on 1 unit insulin drip but now discontinued, transitioned to subcutaneous insulin lantus  - Patient tolerating PO    Follow up:  - Blood glucose level  - BMP        REVIEW OF SYSTEMS:  CONSTITUTIONAL: No fever, chills  HEENT:  No blurry vision No sinus or throat pain  NECK: No pain or stiffness  RESPIRATORY: No cough, wheezing, chills or hemoptysis; No shortness of breath  CARDIOVASCULAR: No chest pain, palpitations  GASTROINTESTINAL: No abdominal pain. No nausea, vomiting, or diarrhea  GENITOURINARY: No dysuria  NEUROLOGICAL: No HA, No focal weakness  SKIN: No itching, burning, rashes, or lesions   MUSCULOSKELETAL: No joint pain or swelling; No muscle, back, or extremity pain    MEDICATIONS:  chlorhexidine 2% Cloths 1 Application(s) Topical <User Schedule>  dextrose 5% + sodium chloride 0.45% with potassium chloride 20 mEq/L 1000 milliLiter(s) IV Continuous <Continuous>  dextrose 5%. 1000 milliLiter(s) IV Continuous <Continuous>  dextrose 5%. 1000 milliLiter(s) IV Continuous <Continuous>  dextrose 5%. 1000 milliLiter(s) IV Continuous <Continuous>  dextrose 5%. 1000 milliLiter(s) IV Continuous <Continuous>  dextrose 50% Injectable 25 Gram(s) IV Push once  dextrose 50% Injectable 12.5 Gram(s) IV Push once  dextrose 50% Injectable 25 Gram(s) IV Push once  dextrose 50% Injectable 25 Gram(s) IV Push once  dextrose 50% Injectable 12.5 Gram(s) IV Push once  dextrose 50% Injectable 25 Gram(s) IV Push once  dextrose Oral Gel 15 Gram(s) Oral once PRN  dextrose Oral Gel 15 Gram(s) Oral once PRN  enoxaparin Injectable 40 milliGRAM(s) SubCutaneous every 24 hours  glucagon  Injectable 1 milliGRAM(s) IntraMuscular once  glucagon  Injectable 1 milliGRAM(s) IntraMuscular once  influenza   Vaccine 0.5 milliLiter(s) IntraMuscular once  insulin lispro (ADMELOG) corrective regimen sliding scale   SubCutaneous three times a day before meals  insulin regular Infusion 1 Unit(s)/Hr IV Continuous <Continuous>  polyethylene glycol 3350 17 Gram(s) Oral every 12 hours  senna 2 Tablet(s) Oral at bedtime      T(C): 36.6 (11-06-23 @ 12:00), Max: 36.8 (11-05-23 @ 18:12)  HR: 77 (11-06-23 @ 14:00) (77 - 112)  BP: 106/55 (11-06-23 @ 12:00) (89/54 - 132/62)  RR: 35 (11-06-23 @ 10:00) (18 - 45)  SpO2: 98% (11-06-23 @ 14:00) (96% - 100%)  Wt(kg): --Vital Signs Last 24 Hrs  T(C): 36.6 (06 Nov 2023 12:00), Max: 36.8 (05 Nov 2023 18:12)  T(F): 97.8 (06 Nov 2023 12:00), Max: 98.2 (05 Nov 2023 18:12)  HR: 77 (06 Nov 2023 14:00) (77 - 112)  BP: 106/55 (06 Nov 2023 12:00) (89/54 - 132/62)  BP(mean): 72 (06 Nov 2023 12:00) (65 - 77)  RR: 35 (06 Nov 2023 10:00) (18 - 45)  SpO2: 98% (06 Nov 2023 14:00) (96% - 100%)    Parameters below as of 06 Nov 2023 14:00  Patient On (Oxygen Delivery Method): room air        PHYSICAL EXAM:  GENERAL: NAD, well-groomed, well-developed  HEAD:  Atraumatic, Normocephalic  EYES: EOMI, PERRLA, conjunctiva and sclera clear  ENMT:  Moist mucous membranes  NECK: Supple, No JVD,  CHEST/LUNG: Clear to auscultation bilaterally; No rales, rhonchi, wheezing, or rubs  HEART: Regular rate and rhythm; No murmurs, rubs, or gallops  ABDOMEN: Soft, Nontender, Nondistended; Bowel sounds present  NEURO: Alert & Oriented X3  EXTREMITIES: No LE edema, no calf tenderness  LYMPH: No lymphadenopathy noted  SKIN: No rashes or lesions    Consultant(s) Notes Reviewed:  [x ] YES  [ ] NO  Care Discussed with Consultants/Other Providers [ x] YES  [ ] NO    LABS:                        12.0   5.57  )-----------( 319      ( 06 Nov 2023 04:52 )             37.1     11-06    132<L>  |  99  |  11  ----------------------------<  350<H>  4.2   |  20  |  0.5<L>    Ca    8.5      06 Nov 2023 11:39  Mg     1.6     11-06    TPro  6.3  /  Alb  4.2  /  TBili  <0.2  /  DBili  x   /  AST  11  /  ALT  7   /  AlkPhos  73  11-06    PT/INR - ( 06 Nov 2023 04:52 )   PT: 11.90 sec;   INR: 1.04 ratio         PTT - ( 06 Nov 2023 04:52 )  PTT:28.4 sec  Urinalysis Basic - ( 06 Nov 2023 11:39 )    Color: x / Appearance: x / SG: x / pH: x  Gluc: 350 mg/dL / Ketone: x  / Bili: x / Urobili: x   Blood: x / Protein: x / Nitrite: x   Leuk Esterase: x / RBC: x / WBC x   Sq Epi: x / Non Sq Epi: x / Bacteria: x      CAPILLARY BLOOD GLUCOSE      POCT Blood Glucose.: 311 mg/dL (06 Nov 2023 11:59)  POCT Blood Glucose.: 302 mg/dL (06 Nov 2023 09:28)  POCT Blood Glucose.: 182 mg/dL (06 Nov 2023 08:29)  POCT Blood Glucose.: 193 mg/dL (06 Nov 2023 07:29)  POCT Blood Glucose.: 171 mg/dL (06 Nov 2023 06:22)  POCT Blood Glucose.: 133 mg/dL (06 Nov 2023 05:13)  POCT Blood Glucose.: 142 mg/dL (06 Nov 2023 04:11)  POCT Blood Glucose.: 172 mg/dL (06 Nov 2023 03:00)  POCT Blood Glucose.: 185 mg/dL (06 Nov 2023 02:04)  POCT Blood Glucose.: 178 mg/dL (06 Nov 2023 01:09)  POCT Blood Glucose.: 198 mg/dL (06 Nov 2023 00:05)  POCT Blood Glucose.: 258 mg/dL (05 Nov 2023 23:13)  POCT Blood Glucose.: 360 mg/dL (05 Nov 2023 21:34)        Urinalysis Basic - ( 06 Nov 2023 11:39 )    Color: x / Appearance: x / SG: x / pH: x  Gluc: 350 mg/dL / Ketone: x  / Bili: x / Urobili: x   Blood: x / Protein: x / Nitrite: x   Leuk Esterase: x / RBC: x / WBC x   Sq Epi: x / Non Sq Epi: x / Bacteria: x        RADIOLOGY & ADDITIONAL TESTS:    Imaging Personally Reviewed:  [x ] YES  [ ] NO        ASSESSMENT/RICO:  IMPRESSION:    #DKA  - Anion gap closing  - Standing insulin: SubQ lantus given at 9am    #Constipation  - Xray KUB done    COVID-19 infection  HO Hashimoto thyroiditis      PLAN:    NEUROLOGY: Avoid CNS depressants    HEENT: Oral care    CARDIOVASCULAR: Daily weights. Strict I&Os, Keep K>4 and Mg>2.      PULMONARY: HOB @ 45 degrees. Aspiration precautions. Keep SpO2 > 94%    GASTROINTESTINAL: Feeding.  Bowel regimen as tolerated.,      RENAL: Monitor BMP. Correct lytes as needed.      INFECTIOUS DISEASE: Monitor off abx.  ID eval for RDSV.  Procal.      ENDOCRINE: Insulin DKA protocol.  transition when AG closes     HEME: Monitor CBC, DVT prophylaxis, Dimer noted    MUSCULOSKELETAL: Increase as tolerated    DISPO: ICU/  Possible DGTF after BW MICU DOWN GRADE NOTE    HPI: 23 year old female with pmhx of DM type 1 (Uncontrolled, follows Dr. Cornelius) and Hashimoto thyroiditis (Controlled) presents to ed with cough, myalgias, and hyperglycemia x 2 days. She took a covid test at home and it was positive. Pt states her glucose has been elevated in the 400s, has been giving herself more insulin which is still NOT controlling her blood sugar. Pt denies nausea, vomiting, diarrhea, abd pain, chest pain, calf pain or swelling, or recent travel/sick contacts.    INTERVAL HPI/OVERNIGHT EVENTS:  - Patient admitted to MICU for DKA, most recent anion gap is closed at 13, Na 132, K 4.2, glucose 350  - Was on 1 unit insulin drip but now discontinued, transitioned to subcutaneous insulin lantus  - Patient tolerating PO    Follow up:  - Blood glucose level  - BMP        REVIEW OF SYSTEMS:  CONSTITUTIONAL: No fever, chills  HEENT:  No blurry vision No sinus or throat pain  NECK: No pain or stiffness  RESPIRATORY: No cough, wheezing, chills or hemoptysis; No shortness of breath  CARDIOVASCULAR: No chest pain, palpitations  GASTROINTESTINAL: No abdominal pain. No nausea, vomiting, or diarrhea  GENITOURINARY: No dysuria  NEUROLOGICAL: No HA, No focal weakness  SKIN: No itching, burning, rashes, or lesions   MUSCULOSKELETAL: No joint pain or swelling; No muscle, back, or extremity pain    MEDICATIONS:  chlorhexidine 2% Cloths 1 Application(s) Topical <User Schedule>  dextrose 5% + sodium chloride 0.45% with potassium chloride 20 mEq/L 1000 milliLiter(s) IV Continuous <Continuous>  dextrose 5%. 1000 milliLiter(s) IV Continuous <Continuous>  dextrose 5%. 1000 milliLiter(s) IV Continuous <Continuous>  dextrose 5%. 1000 milliLiter(s) IV Continuous <Continuous>  dextrose 5%. 1000 milliLiter(s) IV Continuous <Continuous>  dextrose 50% Injectable 25 Gram(s) IV Push once  dextrose 50% Injectable 12.5 Gram(s) IV Push once  dextrose 50% Injectable 25 Gram(s) IV Push once  dextrose 50% Injectable 25 Gram(s) IV Push once  dextrose 50% Injectable 12.5 Gram(s) IV Push once  dextrose 50% Injectable 25 Gram(s) IV Push once  dextrose Oral Gel 15 Gram(s) Oral once PRN  dextrose Oral Gel 15 Gram(s) Oral once PRN  enoxaparin Injectable 40 milliGRAM(s) SubCutaneous every 24 hours  glucagon  Injectable 1 milliGRAM(s) IntraMuscular once  glucagon  Injectable 1 milliGRAM(s) IntraMuscular once  influenza   Vaccine 0.5 milliLiter(s) IntraMuscular once  insulin lispro (ADMELOG) corrective regimen sliding scale   SubCutaneous three times a day before meals  insulin regular Infusion 1 Unit(s)/Hr IV Continuous <Continuous>  polyethylene glycol 3350 17 Gram(s) Oral every 12 hours  senna 2 Tablet(s) Oral at bedtime      T(C): 36.6 (11-06-23 @ 12:00), Max: 36.8 (11-05-23 @ 18:12)  HR: 77 (11-06-23 @ 14:00) (77 - 112)  BP: 106/55 (11-06-23 @ 12:00) (89/54 - 132/62)  RR: 35 (11-06-23 @ 10:00) (18 - 45)  SpO2: 98% (11-06-23 @ 14:00) (96% - 100%)  Wt(kg): --Vital Signs Last 24 Hrs  T(C): 36.6 (06 Nov 2023 12:00), Max: 36.8 (05 Nov 2023 18:12)  T(F): 97.8 (06 Nov 2023 12:00), Max: 98.2 (05 Nov 2023 18:12)  HR: 77 (06 Nov 2023 14:00) (77 - 112)  BP: 106/55 (06 Nov 2023 12:00) (89/54 - 132/62)  BP(mean): 72 (06 Nov 2023 12:00) (65 - 77)  RR: 35 (06 Nov 2023 10:00) (18 - 45)  SpO2: 98% (06 Nov 2023 14:00) (96% - 100%)    Parameters below as of 06 Nov 2023 14:00  Patient On (Oxygen Delivery Method): room air        PHYSICAL EXAM:  GENERAL: NAD, well-groomed, well-developed  HEAD:  Atraumatic, Normocephalic  EYES: EOMI, PERRLA, conjunctiva and sclera clear  ENMT:  Moist mucous membranes  NECK: Supple, No JVD,  CHEST/LUNG: Clear to auscultation bilaterally; No rales, rhonchi, wheezing, or rubs  HEART: Regular rate and rhythm; No murmurs, rubs, or gallops  ABDOMEN: Soft, Nontender, Nondistended; Bowel sounds present  NEURO: Alert & Oriented X3  EXTREMITIES: No LE edema, no calf tenderness  LYMPH: No lymphadenopathy noted  SKIN: No rashes or lesions    Consultant(s) Notes Reviewed:  [x ] YES  [ ] NO  Care Discussed with Consultants/Other Providers [ x] YES  [ ] NO    LABS:                        12.0   5.57  )-----------( 319      ( 06 Nov 2023 04:52 )             37.1     11-06    132<L>  |  99  |  11  ----------------------------<  350<H>  4.2   |  20  |  0.5<L>    Ca    8.5      06 Nov 2023 11:39  Mg     1.6     11-06    TPro  6.3  /  Alb  4.2  /  TBili  <0.2  /  DBili  x   /  AST  11  /  ALT  7   /  AlkPhos  73  11-06    PT/INR - ( 06 Nov 2023 04:52 )   PT: 11.90 sec;   INR: 1.04 ratio         PTT - ( 06 Nov 2023 04:52 )  PTT:28.4 sec  Urinalysis Basic - ( 06 Nov 2023 11:39 )    Color: x / Appearance: x / SG: x / pH: x  Gluc: 350 mg/dL / Ketone: x  / Bili: x / Urobili: x   Blood: x / Protein: x / Nitrite: x   Leuk Esterase: x / RBC: x / WBC x   Sq Epi: x / Non Sq Epi: x / Bacteria: x      CAPILLARY BLOOD GLUCOSE      POCT Blood Glucose.: 311 mg/dL (06 Nov 2023 11:59)  POCT Blood Glucose.: 302 mg/dL (06 Nov 2023 09:28)  POCT Blood Glucose.: 182 mg/dL (06 Nov 2023 08:29)  POCT Blood Glucose.: 193 mg/dL (06 Nov 2023 07:29)  POCT Blood Glucose.: 171 mg/dL (06 Nov 2023 06:22)  POCT Blood Glucose.: 133 mg/dL (06 Nov 2023 05:13)  POCT Blood Glucose.: 142 mg/dL (06 Nov 2023 04:11)  POCT Blood Glucose.: 172 mg/dL (06 Nov 2023 03:00)  POCT Blood Glucose.: 185 mg/dL (06 Nov 2023 02:04)  POCT Blood Glucose.: 178 mg/dL (06 Nov 2023 01:09)  POCT Blood Glucose.: 198 mg/dL (06 Nov 2023 00:05)  POCT Blood Glucose.: 258 mg/dL (05 Nov 2023 23:13)  POCT Blood Glucose.: 360 mg/dL (05 Nov 2023 21:34)        Urinalysis Basic - ( 06 Nov 2023 11:39 )    Color: x / Appearance: x / SG: x / pH: x  Gluc: 350 mg/dL / Ketone: x  / Bili: x / Urobili: x   Blood: x / Protein: x / Nitrite: x   Leuk Esterase: x / RBC: x / WBC x   Sq Epi: x / Non Sq Epi: x / Bacteria: x        RADIOLOGY & ADDITIONAL TESTS:    Imaging Personally Reviewed:  [x ] YES  [ ] NO        ASSESSMENT:  IMPRESSION:    #DKA  - Anion gap closing  - Standing insulin: SubQ lantus given at 9am    #Constipation  - Xray KUB done    COVID-19 infection  HO Hashimoto thyroiditis      PLAN:    NEUROLOGY: Avoid CNS depressants    HEENT: Oral care    CARDIOVASCULAR: Daily weights. Strict I&Os, Keep K>4 and Mg>2.      PULMONARY: HOB @ 45 degrees. Aspiration precautions. Keep SpO2 > 94%    GASTROINTESTINAL: Feeding.  Bowel regimen as tolerated.,      RENAL: Monitor BMP. Correct lytes as needed.      INFECTIOUS DISEASE: Monitor off abx.  ID eval for RDSV.  Procal.      ENDOCRINE: Insulin DKA protocol.  transition when AG closes     HEME: Monitor CBC, DVT prophylaxis, Dimer noted    MUSCULOSKELETAL: Increase as tolerated    DISPO: ICU/  Possible DGTF after BW

## 2023-11-06 NOTE — PROGRESS NOTE ADULT - SUBJECTIVE AND OBJECTIVE BOX
Patient is a 23y old  Female who presents with a chief complaint of Hyperglycemia and cough     HPI:  23 year old female with pmhx of DM type 1 (Uncontrolled, follows Dr. Cornelius) and Hashimoto thyroiditis (Controlled) presents to ed with cough, myalgias, and hyperglycemia x 2 days. She took a covid test at home and it was positive. Pt states her glucose has been elevated in the 400s, has been giving herself more insulin which is still NOT controlling her blood sugar. Pt denies nausea, vomiting, diarrhea, abd pain, chest pain, calf pain or swelling, or recent travel/sick contacts.    Endorses constipation, No BM since 1-2 weeks.    In the ED, BHB +ve, admitted to ICU for DKA.    Vital Signs Last 24 Hrs:  T(F): 98.2 (05 Nov 2023 18:12), Max: 98.2 (05 Nov 2023 18:12)  HR: 112 (05 Nov 2023 18:12) (112 - 112)  BP: 132/62 (05 Nov 2023 18:12) (132/62 - 132/62)  RR: 18 (05 Nov 2023 18:12) (18 - 18)  SpO2: 98% (05 Nov 2023 18:12) (98% - 98%) on RA   (05 Nov 2023 21:37)      PAST MEDICAL & SURGICAL HISTORY:  DM (diabetes mellitus) type 1 with ketoacidosis      Hashimoto's thyroiditis      History of tonsillectomy          SOCIAL HX:   Smoking      No                    ETOH                            Other    FAMILY HISTORY:  :  No known cardiovacular family hisotry     Review Of Systems:     All ROS are negative except per HPI       Allergies    No Known Allergies    Intolerances          PHYSICAL EXAM    ICU Vital Signs Last 24 Hrs  T(C): 36.6 (05 Nov 2023 23:00), Max: 36.8 (05 Nov 2023 18:12)  T(F): 97.9 (05 Nov 2023 23:00), Max: 98.2 (05 Nov 2023 18:12)  HR: 94 (06 Nov 2023 08:00) (88 - 112)  BP: 100/64 (06 Nov 2023 08:00) (89/54 - 132/62)  BP(mean): 77 (06 Nov 2023 08:00) (65 - 77)  ABP: --  ABP(mean): --  RR: 45 (06 Nov 2023 08:00) (18 - 45)  SpO2: 98% (06 Nov 2023 08:39) (96% - 100%)    O2 Parameters below as of 06 Nov 2023 08:39  Patient On (Oxygen Delivery Method): room air            CONSTITUTIONAL:  NAD    ENT:   Airway patent,   Mouth with normal mucosa.   No thrush      CARDIAC:   Normal rate,   Regular rhythm.    No edema      Vascular:   normal systolic impulse  no bruits    RESPIRATORY:   No wheezing  Bilateral BS   Not tachypneic,  No use of accessory muscles    GASTROINTESTINAL:  Abdomen soft,   Non-tender,   No guarding,   + BS      NEUROLOGICAL:   Alert and oriented   No motor deficits.    SKIN:   Skin normal color for race,   No evidence of rash.                11-05-23 @ 07:01  -  11-06-23 @ 07:00  --------------------------------------------------------  IN:    dextrose 5% + sodium chloride 0.45% w/ Additives: 775 mL    Insulin: 1 mL    Insulin: 7 mL    Insulin: 5 mL    Insulin: 6 mL    Lactated Ringers Bolus: 500 mL    sodium chloride 0.45% w/ Additives: 250 mL  Total IN: 1544 mL    OUT:  Total OUT: 0 mL    Total NET: 1544 mL      11-06-23 @ 07:01  -  11-06-23 @ 09:31  --------------------------------------------------------  IN:    dextrose 5% + sodium chloride 0.45% w/ Additives: 200 mL    Insulin: 1 mL  Total IN: 201 mL    OUT:  Total OUT: 0 mL    Total NET: 201 mL          LABS:                          12.0   5.57  )-----------( 319      ( 06 Nov 2023 04:52 )             37.1                                               11-06    136  |  105  |  13  ----------------------------<  159<H>  4.2   |  18  |  0.5<L>    Ca    9.3      06 Nov 2023 04:52  Mg     1.6     11-06    TPro  6.3  /  Alb  4.2  /  TBili  <0.2  /  DBili  x   /  AST  11  /  ALT  7   /  AlkPhos  73  11-06      PT/INR - ( 06 Nov 2023 04:52 )   PT: 11.90 sec;   INR: 1.04 ratio         PTT - ( 06 Nov 2023 04:52 )  PTT:28.4 sec                                       Urinalysis Basic - ( 06 Nov 2023 04:52 )    Color: x / Appearance: x / SG: x / pH: x  Gluc: 159 mg/dL / Ketone: x  / Bili: x / Urobili: x   Blood: x / Protein: x / Nitrite: x   Leuk Esterase: x / RBC: x / WBC x   Sq Epi: x / Non Sq Epi: x / Bacteria: x        CARDIAC MARKERS ( 05 Nov 2023 20:27 )  x     / <0.01 ng/mL / x     / x     / x                                                LIVER FUNCTIONS - ( 06 Nov 2023 04:52 )  Alb: 4.2 g/dL / Pro: 6.3 g/dL / ALK PHOS: 73 U/L / ALT: 7 U/L / AST: 11 U/L / GGT: x                                                                                                                                       X-Rays reviewed                                                                                     ECHO        MEDICATIONS  (STANDING):  dextrose 5% + sodium chloride 0.45% with potassium chloride 20 mEq/L 1000 milliLiter(s) (100 mL/Hr) IV Continuous <Continuous>  enoxaparin Injectable 40 milliGRAM(s) SubCutaneous every 24 hours  influenza   Vaccine 0.5 milliLiter(s) IntraMuscular once  insulin regular Infusion 1 Unit(s)/Hr (1 mL/Hr) IV Continuous <Continuous>  polyethylene glycol 3350 17 Gram(s) Oral every 12 hours  senna 2 Tablet(s) Oral at bedtime    MEDICATIONS  (PRN):

## 2023-11-06 NOTE — DISCHARGE NOTE PROVIDER - NSDCMRMEDTOKEN_GEN_ALL_CORE_FT
Afrezza 12 units inhalation powder: 12 unit(s) inhaled 3 times a day  Toujeo SoloStar 300 units/mL subcutaneous solution: 30 international unit(s) subcutaneous once a day   Afrezza 12 units inhalation powder: 12 unit(s) inhaled 3 times a day  nitrofurantoin macrocrystals-monohydrate 100 mg oral capsule: 1 cap(s) orally 2 times a day  ofloxacin 0.3% otic solution: 5 drop(s) to each affected ear 2 times a day  Tojunaid SoloStar 300 units/mL subcutaneous solution: 30 international unit(s) subcutaneous once a day   Afrezza 12 units inhalation powder: 12 unit(s) inhaled 3 times a day  nitrofurantoin macrocrystals-monohydrate 100 mg oral capsule: 1 cap(s) orally 2 times a day  ofloxacin 0.3% otic solution: 5 drop(s) to each affected ear 2 times a day  Tojunaid SoloStar 300 units/mL subcutaneous solution: 22 international unit(s) subcutaneous once a day

## 2023-11-06 NOTE — PROGRESS NOTE ADULT - SUBJECTIVE AND OBJECTIVE BOX
HPI:  23 year old female with pmhx of DM type 1 (Uncontrolled, follows Dr. Cornelius) and Hashimoto thyroiditis (Controlled) presents to ed with cough, myalgias, and hyperglycemia x 2 days. She took a covid test at home and it was positive. Pt states her glucose has been elevated in the 400s, has been giving herself more insulin which is still NOT controlling her blood sugar. Pt denies nausea, vomiting, diarrhea, abd pain, chest pain, calf pain or swelling, or recent travel/sick contacts.    Endorses constipation, No BM since 1-2 weeks.    In the ED, BHB +ve, admitted to ICU for DKA.    Vital Signs Last 24 Hrs:  T(F): 98.2 (2023 18:12), Max: 98.2 (2023 18:12)  HR: 112 (2023 18:12) (112 - 112)  BP: 132/62 (2023 18:12) (132/62 - 132/62)  RR: 18 (2023 18:12) (18 - 18)  SpO2: 98% (2023 18:12) (98% - 98%) on RA   (2023 21:37)      INTERVAL HISTORY:  ·	Discontinued NPO, patient can eat now    PAST MEDICAL & SURGICAL HISTORY  DM (diabetes mellitus) type 1 with ketoacidosis    Hashimoto's thyroiditis    History of tonsillectomy        ALLERGIES:  No Known Allergies      MEDICATIONS:  MEDICATIONS  (STANDING):  chlorhexidine 2% Cloths 1 Application(s) Topical <User Schedule>  dextrose 5% + sodium chloride 0.45% with potassium chloride 20 mEq/L 1000 milliLiter(s) (100 mL/Hr) IV Continuous <Continuous>  dextrose 5%. 1000 milliLiter(s) (50 mL/Hr) IV Continuous <Continuous>  dextrose 5%. 1000 milliLiter(s) (100 mL/Hr) IV Continuous <Continuous>  dextrose 5%. 1000 milliLiter(s) (50 mL/Hr) IV Continuous <Continuous>  dextrose 5%. 1000 milliLiter(s) (100 mL/Hr) IV Continuous <Continuous>  dextrose 50% Injectable 25 Gram(s) IV Push once  dextrose 50% Injectable 12.5 Gram(s) IV Push once  dextrose 50% Injectable 25 Gram(s) IV Push once  dextrose 50% Injectable 25 Gram(s) IV Push once  dextrose 50% Injectable 12.5 Gram(s) IV Push once  dextrose 50% Injectable 25 Gram(s) IV Push once  enoxaparin Injectable 40 milliGRAM(s) SubCutaneous every 24 hours  glucagon  Injectable 1 milliGRAM(s) IntraMuscular once  glucagon  Injectable 1 milliGRAM(s) IntraMuscular once  influenza   Vaccine 0.5 milliLiter(s) IntraMuscular once  insulin lispro (ADMELOG) corrective regimen sliding scale   SubCutaneous three times a day before meals  insulin lispro (ADMELOG) corrective regimen sliding scale   SubCutaneous at bedtime  insulin lispro Injectable (ADMELOG) 3 Unit(s) SubCutaneous three times a day before meals  insulin regular Infusion 1 Unit(s)/Hr (1 mL/Hr) IV Continuous <Continuous>  polyethylene glycol 3350 17 Gram(s) Oral every 12 hours  senna 2 Tablet(s) Oral at bedtime    MEDICATIONS  (PRN):  dextrose Oral Gel 15 Gram(s) Oral once PRN Blood Glucose LESS THAN 70 milliGRAM(s)/deciliter  dextrose Oral Gel 15 Gram(s) Oral once PRN Blood Glucose LESS THAN 70 milliGRAM(s)/deciliter      HOME MEDICATIONS:  Home Medications:  Afrezza 12 units inhalation powder: 12 unit(s) inhaled 3 times a day (2023 22:24)  Toujeo SoloStar 300 units/mL subcutaneous solution: 30 international unit(s) subcutaneous once a day (2023 22:24)        OBJECTIVE:  ICU Vital Signs Last 24 Hrs  T(C): 36.6 (2023 23:00), Max: 36.8 (2023 18:12)  T(F): 97.9 (2023 23:00), Max: 98.2 (2023 18:12)  HR: 93 (2023 10:00) (88 - 112)  BP: 100/64 (2023 10:00) (89/54 - 132/62)  BP(mean): 75 (2023 10:00) (65 - 77)  ABP: --  ABP(mean): --  RR: 35 (2023 10:00) (18 - 45)  SpO2: 96% (2023 10:00) (96% - 100%)    O2 Parameters below as of 2023 10:00  Patient On (Oxygen Delivery Method): room air            Adult Advanced Hemodynamics Last 24 Hrs  CVP(mm Hg): --  CVP(cm H2O): --  CO: --  CI: --  PA: --  PA(mean): --  PCWP: --  SVR: --  SVRI: --  PVR: --  PVRI: --  I&O's Summary    2023 07:01  -  2023 07:00  --------------------------------------------------------  IN: 1544 mL / OUT: 0 mL / NET: 1544 mL    2023 07:01  -  2023 11:49  --------------------------------------------------------  IN: 304 mL / OUT: 1 mL / NET: 303 mL      Daily Height in cm: 167.64 (2023 23:00)    Daily Weight in k.6 (2023 04:00)    PHYSICAL EXAM:  NEURO: patient is awake , alert and oriented  GEN: Not in acute distress  NECK: no thyroid enlargement, no JVD  LUNGS: Clear to auscultation bilaterally   CARDIOVASCULAR: S1/S2 present, RRR , no murmurs or rubs, no carotid bruits,  + PP bilaterally  ABD: Soft, non-tender, non-distended, +BS  EXT: No MERLIN  SKIN: Intact  ACCESS Site:    LABS:                        12.0   5.57  )-----------( 319      ( 2023 04:52 )             37.1     11-    136  |  105  |  13  ----------------------------<  159<H>  4.2   |  18  |  0.5<L>    Ca    9.3      2023 04:52  Mg     1.6     11-    TPro  6.3  /  Alb  4.2  /  TBili  <0.2  /  DBili  x   /  AST  11  /  ALT  7   /  AlkPhos  73      PT/INR - ( 2023 04:52 )   PT: 11.90 sec;   INR: 1.04 ratio         PTT - ( 2023 04:52 )  PTT:28.4 sec  Lactate, Blood: 0.7 mmol/L (23 @ 01:10)  Troponin T, Serum: <0.01 ng/mL (23 @ 20:27)  Lactate, Blood: 2.1 mmol/L *H* (23 @ 19:19)       Chol 168 LDL -- HDL 44<L> Trig 122   HPI:  23 year old female with pmhx of DM type 1 (Uncontrolled, follows Dr. Cornelius) and Hashimoto thyroiditis (Controlled) presents to ed with cough, myalgias, and hyperglycemia x 2 days. She took a covid test at home and it was positive. Pt states her glucose has been elevated in the 400s, has been giving herself more insulin which is still NOT controlling her blood sugar. Pt denies nausea, vomiting, diarrhea, abd pain, chest pain, calf pain or swelling, or recent travel/sick contacts.    Endorses constipation, No BM since 1-2 weeks.    In the ED, BHB +ve, admitted to ICU for DKA.    Vital Signs Last 24 Hrs:  T(F): 98.2 (2023 18:12), Max: 98.2 (2023 18:12)  HR: 112 (2023 18:12) (112 - 112)  BP: 132/62 (2023 18:12) (132/62 - 132/62)  RR: 18 (2023 18:12) (18 - 18)  SpO2: 98% (2023 18:12) (98% - 98%) on RA   (2023 21:37)      INTERVAL HISTORY:  ·	Discontinued NPO, patient can eat now    PAST MEDICAL & SURGICAL HISTORY  DM (diabetes mellitus) type 1 with ketoacidosis    Hashimoto's thyroiditis    History of tonsillectomy        ALLERGIES:  No Known Allergies      MEDICATIONS:  MEDICATIONS  (STANDING):  chlorhexidine 2% Cloths 1 Application(s) Topical <User Schedule>  dextrose 5% + sodium chloride 0.45% with potassium chloride 20 mEq/L 1000 milliLiter(s) (100 mL/Hr) IV Continuous <Continuous>  dextrose 5%. 1000 milliLiter(s) (50 mL/Hr) IV Continuous <Continuous>  dextrose 5%. 1000 milliLiter(s) (100 mL/Hr) IV Continuous <Continuous>  dextrose 5%. 1000 milliLiter(s) (50 mL/Hr) IV Continuous <Continuous>  dextrose 5%. 1000 milliLiter(s) (100 mL/Hr) IV Continuous <Continuous>  dextrose 50% Injectable 25 Gram(s) IV Push once  dextrose 50% Injectable 12.5 Gram(s) IV Push once  dextrose 50% Injectable 25 Gram(s) IV Push once  dextrose 50% Injectable 25 Gram(s) IV Push once  dextrose 50% Injectable 12.5 Gram(s) IV Push once  dextrose 50% Injectable 25 Gram(s) IV Push once  enoxaparin Injectable 40 milliGRAM(s) SubCutaneous every 24 hours  glucagon  Injectable 1 milliGRAM(s) IntraMuscular once  glucagon  Injectable 1 milliGRAM(s) IntraMuscular once  influenza   Vaccine 0.5 milliLiter(s) IntraMuscular once  insulin lispro (ADMELOG) corrective regimen sliding scale   SubCutaneous three times a day before meals  insulin lispro (ADMELOG) corrective regimen sliding scale   SubCutaneous at bedtime  insulin lispro Injectable (ADMELOG) 3 Unit(s) SubCutaneous three times a day before meals  insulin regular Infusion 1 Unit(s)/Hr (1 mL/Hr) IV Continuous <Continuous>  polyethylene glycol 3350 17 Gram(s) Oral every 12 hours  senna 2 Tablet(s) Oral at bedtime    MEDICATIONS  (PRN):  dextrose Oral Gel 15 Gram(s) Oral once PRN Blood Glucose LESS THAN 70 milliGRAM(s)/deciliter  dextrose Oral Gel 15 Gram(s) Oral once PRN Blood Glucose LESS THAN 70 milliGRAM(s)/deciliter      HOME MEDICATIONS:  Home Medications:  Afrezza 12 units inhalation powder: 12 unit(s) inhaled 3 times a day (2023 22:24)  Toujeo SoloStar 300 units/mL subcutaneous solution: 30 international unit(s) subcutaneous once a day (2023 22:24)        OBJECTIVE:  ICU Vital Signs Last 24 Hrs  T(C): 36.6 (2023 23:00), Max: 36.8 (2023 18:12)  T(F): 97.9 (2023 23:00), Max: 98.2 (2023 18:12)  HR: 93 (2023 10:00) (88 - 112)  BP: 100/64 (2023 10:00) (89/54 - 132/62)  BP(mean): 75 (2023 10:00) (65 - 77)  ABP: --  ABP(mean): --  RR: 35 (2023 10:00) (18 - 45)  SpO2: 96% (2023 10:00) (96% - 100%)    O2 Parameters below as of 2023 10:00  Patient On (Oxygen Delivery Method): room air        2023 07:01  -  2023 07:00  --------------------------------------------------------  IN: 1544 mL / OUT: 0 mL / NET: 1544 mL    2023 07:01  -  2023 11:49  --------------------------------------------------------  IN: 304 mL / OUT: 1 mL / NET: 303 mL      Daily Height in cm: 167.64 (2023 23:00)    Daily Weight in k.6 (2023 04:00)    PHYSICAL EXAM:  NEURO: patient is awake , alert and oriented  GEN: Not in acute distress, comfortable  NECK: no thyroid enlargement, no JVD  LUNGS: Clear to auscultation bilaterally   CARDIOVASCULAR: S1/S2 present, RRR , no murmurs or rubs, no carotid bruits  ABD: Soft, non-tender, non-distended  EXT: No MERLIN  SKIN: Intact    LABS:                        12.0   5.57  )-----------( 319      ( 2023 04:52 )             37.1         136  |  105  |  13  ----------------------------<  159<H>  4.2   |  18  |  0.5<L>    Ca    9.3      2023 04:52  Mg     1.6         TPro  6.3  /  Alb  4.2  /  TBili  <0.2  /  DBili  x   /  AST  11  /  ALT  7   /  AlkPhos  73  11-    PT/INR - ( 2023 04:52 )   PT: 11.90 sec;   INR: 1.04 ratio         PTT - ( 2023 04:52 )  PTT:28.4 sec  Lactate, Blood: 0.7 mmol/L (23 @ 01:10)  Troponin T, Serum: <0.01 ng/mL (23 @ 20:27)  Lactate, Blood: 2.1 mmol/L *H* (23 @ 19:19)       Chol 168 LDL -- HDL 44<L> Trig 122

## 2023-11-06 NOTE — DISCHARGE NOTE PROVIDER - CARE PROVIDERS DIRECT ADDRESSES
,DirectAddress_Unknown,[email or web address] ,DirectAddress_Unknown,[email or web address],DirectAddress_Unknown

## 2023-11-07 LAB
A1C WITH ESTIMATED AVERAGE GLUCOSE RESULT: 10.2 % — HIGH (ref 4–5.6)
A1C WITH ESTIMATED AVERAGE GLUCOSE RESULT: 10.2 % — HIGH (ref 4–5.6)
ANION GAP SERPL CALC-SCNC: 13 MMOL/L — SIGNIFICANT CHANGE UP (ref 7–14)
ANION GAP SERPL CALC-SCNC: 13 MMOL/L — SIGNIFICANT CHANGE UP (ref 7–14)
BASOPHILS # BLD AUTO: 0.04 K/UL — SIGNIFICANT CHANGE UP (ref 0–0.2)
BASOPHILS # BLD AUTO: 0.04 K/UL — SIGNIFICANT CHANGE UP (ref 0–0.2)
BASOPHILS NFR BLD AUTO: 0.6 % — SIGNIFICANT CHANGE UP (ref 0–1)
BASOPHILS NFR BLD AUTO: 0.6 % — SIGNIFICANT CHANGE UP (ref 0–1)
BUN SERPL-MCNC: 7 MG/DL — LOW (ref 10–20)
BUN SERPL-MCNC: 7 MG/DL — LOW (ref 10–20)
CALCIUM SERPL-MCNC: 8.9 MG/DL — SIGNIFICANT CHANGE UP (ref 8.4–10.4)
CALCIUM SERPL-MCNC: 8.9 MG/DL — SIGNIFICANT CHANGE UP (ref 8.4–10.4)
CHLORIDE SERPL-SCNC: 107 MMOL/L — SIGNIFICANT CHANGE UP (ref 98–110)
CHLORIDE SERPL-SCNC: 107 MMOL/L — SIGNIFICANT CHANGE UP (ref 98–110)
CO2 SERPL-SCNC: 21 MMOL/L — SIGNIFICANT CHANGE UP (ref 17–32)
CO2 SERPL-SCNC: 21 MMOL/L — SIGNIFICANT CHANGE UP (ref 17–32)
CREAT SERPL-MCNC: 0.5 MG/DL — LOW (ref 0.7–1.5)
CREAT SERPL-MCNC: 0.5 MG/DL — LOW (ref 0.7–1.5)
EGFR: 137 ML/MIN/1.73M2 — SIGNIFICANT CHANGE UP
EGFR: 137 ML/MIN/1.73M2 — SIGNIFICANT CHANGE UP
EOSINOPHIL # BLD AUTO: 0.1 K/UL — SIGNIFICANT CHANGE UP (ref 0–0.7)
EOSINOPHIL # BLD AUTO: 0.1 K/UL — SIGNIFICANT CHANGE UP (ref 0–0.7)
EOSINOPHIL NFR BLD AUTO: 1.4 % — SIGNIFICANT CHANGE UP (ref 0–8)
EOSINOPHIL NFR BLD AUTO: 1.4 % — SIGNIFICANT CHANGE UP (ref 0–8)
ESTIMATED AVERAGE GLUCOSE: 246 MG/DL — HIGH (ref 68–114)
ESTIMATED AVERAGE GLUCOSE: 246 MG/DL — HIGH (ref 68–114)
GLUCOSE BLDC GLUCOMTR-MCNC: 151 MG/DL — HIGH (ref 70–99)
GLUCOSE BLDC GLUCOMTR-MCNC: 151 MG/DL — HIGH (ref 70–99)
GLUCOSE BLDC GLUCOMTR-MCNC: 154 MG/DL — HIGH (ref 70–99)
GLUCOSE BLDC GLUCOMTR-MCNC: 154 MG/DL — HIGH (ref 70–99)
GLUCOSE BLDC GLUCOMTR-MCNC: 198 MG/DL — HIGH (ref 70–99)
GLUCOSE BLDC GLUCOMTR-MCNC: 67 MG/DL — LOW (ref 70–99)
GLUCOSE BLDC GLUCOMTR-MCNC: 67 MG/DL — LOW (ref 70–99)
GLUCOSE BLDC GLUCOMTR-MCNC: 69 MG/DL — LOW (ref 70–99)
GLUCOSE BLDC GLUCOMTR-MCNC: 69 MG/DL — LOW (ref 70–99)
GLUCOSE SERPL-MCNC: 61 MG/DL — LOW (ref 70–99)
GLUCOSE SERPL-MCNC: 61 MG/DL — LOW (ref 70–99)
HCT VFR BLD CALC: 37.1 % — SIGNIFICANT CHANGE UP (ref 37–47)
HCT VFR BLD CALC: 37.1 % — SIGNIFICANT CHANGE UP (ref 37–47)
HGB BLD-MCNC: 12.2 G/DL — SIGNIFICANT CHANGE UP (ref 12–16)
HGB BLD-MCNC: 12.2 G/DL — SIGNIFICANT CHANGE UP (ref 12–16)
IMM GRANULOCYTES NFR BLD AUTO: 0.3 % — SIGNIFICANT CHANGE UP (ref 0.1–0.3)
IMM GRANULOCYTES NFR BLD AUTO: 0.3 % — SIGNIFICANT CHANGE UP (ref 0.1–0.3)
LYMPHOCYTES # BLD AUTO: 1.66 K/UL — SIGNIFICANT CHANGE UP (ref 1.2–3.4)
LYMPHOCYTES # BLD AUTO: 1.66 K/UL — SIGNIFICANT CHANGE UP (ref 1.2–3.4)
LYMPHOCYTES # BLD AUTO: 23.7 % — SIGNIFICANT CHANGE UP (ref 20.5–51.1)
LYMPHOCYTES # BLD AUTO: 23.7 % — SIGNIFICANT CHANGE UP (ref 20.5–51.1)
MAGNESIUM SERPL-MCNC: 1.8 MG/DL — SIGNIFICANT CHANGE UP (ref 1.8–2.4)
MAGNESIUM SERPL-MCNC: 1.8 MG/DL — SIGNIFICANT CHANGE UP (ref 1.8–2.4)
MCHC RBC-ENTMCNC: 30 PG — SIGNIFICANT CHANGE UP (ref 27–31)
MCHC RBC-ENTMCNC: 30 PG — SIGNIFICANT CHANGE UP (ref 27–31)
MCHC RBC-ENTMCNC: 32.9 G/DL — SIGNIFICANT CHANGE UP (ref 32–37)
MCHC RBC-ENTMCNC: 32.9 G/DL — SIGNIFICANT CHANGE UP (ref 32–37)
MCV RBC AUTO: 91.2 FL — SIGNIFICANT CHANGE UP (ref 81–99)
MCV RBC AUTO: 91.2 FL — SIGNIFICANT CHANGE UP (ref 81–99)
MONOCYTES # BLD AUTO: 0.7 K/UL — HIGH (ref 0.1–0.6)
MONOCYTES # BLD AUTO: 0.7 K/UL — HIGH (ref 0.1–0.6)
MONOCYTES NFR BLD AUTO: 10 % — HIGH (ref 1.7–9.3)
MONOCYTES NFR BLD AUTO: 10 % — HIGH (ref 1.7–9.3)
NEUTROPHILS # BLD AUTO: 4.48 K/UL — SIGNIFICANT CHANGE UP (ref 1.4–6.5)
NEUTROPHILS # BLD AUTO: 4.48 K/UL — SIGNIFICANT CHANGE UP (ref 1.4–6.5)
NEUTROPHILS NFR BLD AUTO: 64 % — SIGNIFICANT CHANGE UP (ref 42.2–75.2)
NEUTROPHILS NFR BLD AUTO: 64 % — SIGNIFICANT CHANGE UP (ref 42.2–75.2)
NRBC # BLD: 0 /100 WBCS — SIGNIFICANT CHANGE UP (ref 0–0)
NRBC # BLD: 0 /100 WBCS — SIGNIFICANT CHANGE UP (ref 0–0)
PLATELET # BLD AUTO: 296 K/UL — SIGNIFICANT CHANGE UP (ref 130–400)
PLATELET # BLD AUTO: 296 K/UL — SIGNIFICANT CHANGE UP (ref 130–400)
PMV BLD: 9.8 FL — SIGNIFICANT CHANGE UP (ref 7.4–10.4)
PMV BLD: 9.8 FL — SIGNIFICANT CHANGE UP (ref 7.4–10.4)
POTASSIUM SERPL-MCNC: 3.5 MMOL/L — SIGNIFICANT CHANGE UP (ref 3.5–5)
POTASSIUM SERPL-MCNC: 3.5 MMOL/L — SIGNIFICANT CHANGE UP (ref 3.5–5)
POTASSIUM SERPL-SCNC: 3.5 MMOL/L — SIGNIFICANT CHANGE UP (ref 3.5–5)
POTASSIUM SERPL-SCNC: 3.5 MMOL/L — SIGNIFICANT CHANGE UP (ref 3.5–5)
PROCALCITONIN SERPL-MCNC: 0.1 NG/ML — SIGNIFICANT CHANGE UP (ref 0.02–0.1)
PROCALCITONIN SERPL-MCNC: 0.1 NG/ML — SIGNIFICANT CHANGE UP (ref 0.02–0.1)
RBC # BLD: 4.07 M/UL — LOW (ref 4.2–5.4)
RBC # BLD: 4.07 M/UL — LOW (ref 4.2–5.4)
RBC # FLD: 11.9 % — SIGNIFICANT CHANGE UP (ref 11.5–14.5)
RBC # FLD: 11.9 % — SIGNIFICANT CHANGE UP (ref 11.5–14.5)
SODIUM SERPL-SCNC: 141 MMOL/L — SIGNIFICANT CHANGE UP (ref 135–146)
SODIUM SERPL-SCNC: 141 MMOL/L — SIGNIFICANT CHANGE UP (ref 135–146)
WBC # BLD: 7 K/UL — SIGNIFICANT CHANGE UP (ref 4.8–10.8)
WBC # BLD: 7 K/UL — SIGNIFICANT CHANGE UP (ref 4.8–10.8)
WBC # FLD AUTO: 7 K/UL — SIGNIFICANT CHANGE UP (ref 4.8–10.8)
WBC # FLD AUTO: 7 K/UL — SIGNIFICANT CHANGE UP (ref 4.8–10.8)

## 2023-11-07 PROCEDURE — 99231 SBSQ HOSP IP/OBS SF/LOW 25: CPT

## 2023-11-07 PROCEDURE — 99233 SBSQ HOSP IP/OBS HIGH 50: CPT

## 2023-11-07 RX ORDER — IBUPROFEN 200 MG
400 TABLET ORAL ONCE
Refills: 0 | Status: DISCONTINUED | OUTPATIENT
Start: 2023-11-07 | End: 2023-11-09

## 2023-11-07 RX ORDER — DEXTROSE 50 % IN WATER 50 %
50 SYRINGE (ML) INTRAVENOUS ONCE
Refills: 0 | Status: COMPLETED | OUTPATIENT
Start: 2023-11-07 | End: 2023-11-07

## 2023-11-07 RX ORDER — OFLOXACIN OTIC SOLUTION 3 MG/ML
5 SOLUTION/ DROPS AURICULAR (OTIC)
Refills: 0 | Status: DISCONTINUED | OUTPATIENT
Start: 2023-11-07 | End: 2023-11-09

## 2023-11-07 RX ORDER — MAGNESIUM SULFATE 500 MG/ML
2 VIAL (ML) INJECTION ONCE
Refills: 0 | Status: COMPLETED | OUTPATIENT
Start: 2023-11-07 | End: 2023-11-07

## 2023-11-07 RX ORDER — CEFTRIAXONE 500 MG/1
1000 INJECTION, POWDER, FOR SOLUTION INTRAMUSCULAR; INTRAVENOUS EVERY 24 HOURS
Refills: 0 | Status: DISCONTINUED | OUTPATIENT
Start: 2023-11-07 | End: 2023-11-08

## 2023-11-07 RX ORDER — DEXTROSE MONOHYDRATE, SODIUM CHLORIDE, AND POTASSIUM CHLORIDE 50; .745; 4.5 G/1000ML; G/1000ML; G/1000ML
1000 INJECTION, SOLUTION INTRAVENOUS
Refills: 0 | Status: DISCONTINUED | OUTPATIENT
Start: 2023-11-07 | End: 2023-11-07

## 2023-11-07 RX ORDER — INSULIN LISPRO 100/ML
VIAL (ML) SUBCUTANEOUS
Refills: 0 | Status: DISCONTINUED | OUTPATIENT
Start: 2023-11-07 | End: 2023-11-08

## 2023-11-07 RX ADMIN — OFLOXACIN OTIC SOLUTION 5 DROP(S): 3 SOLUTION/ DROPS AURICULAR (OTIC) at 22:20

## 2023-11-07 RX ADMIN — INSULIN GLARGINE 22 UNIT(S): 100 INJECTION, SOLUTION SUBCUTANEOUS at 08:21

## 2023-11-07 RX ADMIN — Medication 25 GRAM(S): at 10:50

## 2023-11-07 RX ADMIN — Medication 50 MILLILITER(S): at 01:55

## 2023-11-07 RX ADMIN — CEFTRIAXONE 100 MILLIGRAM(S): 500 INJECTION, POWDER, FOR SOLUTION INTRAMUSCULAR; INTRAVENOUS at 23:24

## 2023-11-07 RX ADMIN — CEFTRIAXONE 100 MILLIGRAM(S): 500 INJECTION, POWDER, FOR SOLUTION INTRAMUSCULAR; INTRAVENOUS at 00:21

## 2023-11-07 NOTE — PHARMACOTHERAPY INTERVENTION NOTE - COMMENTS
Lantus 7 units sc daily @ 1100, pt received Lantus 15 units sc x1 ~0900, d/w team, will cancel Lantus 7 units order for now, will assess daily regimen later
IVF D5-1/2 NS + 20meq/KCL, d/w team to evaluate, pt on a diet, d/c IVF
med rounds-Mg 1.6, replete magnesium 2g IV q2h x2

## 2023-11-07 NOTE — PROGRESS NOTE ADULT - ATTENDING COMMENTS
IMPRESSION:    DKA - resolved  COVID-19 infection  Constipation  HO Hashimoto thyroiditis    Plan as outlined above

## 2023-11-07 NOTE — PHARMACOTHERAPY INTERVENTION NOTE - INTERVENTION TYPE RECOOMEND
Therapy Recommended - Alternative treatment
Therapy Recommended - Additional therapy
Therapy duplication

## 2023-11-07 NOTE — PROGRESS NOTE ADULT - ATTENDING SUPERVISION STATEMENT
"12/23/2020       RE: Sowmya Villavicencio  [address] [address]     Dear Colleague,    Thank you for referring your patient, Sowmya Villavicencio, to the [facility]OLOG[facility] [place] at [facility]. Please see a copy of my visit note below.    [place]  CHIEF COMPLAINT   It was my pleasure to see Ingrid Villavicencio who is a 64 year old male for follow-up of prostate cancer .      HPI   Ingrid Villavicencio is a very pleasant 64 year old male    Initially seen 9/25/20:  \"Ingrid Villavicencio is a 64 year old male who is being seen for evaluation of Elevated PSA.  He had recent PSA in August 2020 2.8.  No clear records that I could find of a prior PSA.  He denies any voiding symptoms.  He denies any family history of prostate cancer.  He denies any hip or bony tenderness.\"    10/14/20:  MR- fusion biopsy    Initial PSA: 30.5  Biopsy [place] Score: 4 + 5 = 9  Risk Group: High  Status post RALP/BPLND on 12/14/21  Age at time of surgery: 64  Pathologic Wolfgang Score: 5 + 4 = 9  Positive Margins: None  Extra Capsular Extension: None  SV Involvement: None  Pathologic Stage: pT2  Number of Lymph Nodes Removed: 7  Number of Positive Lymph Nodes: 0  Nerve status: Right nerve sparing, Left partial nerve sparing    TODAY:        PHYSICAL EXAM  Patient is a 64 year old  male   Vitals: There were no vitals taken for this visit.  There is no height or weight on file to calculate BMI.  General Appearance Adult:   Alert, no acute distress, oriented  HENT: throat/mouth:normal, good dentition  Lungs: no respiratory distress, or pursed lip breathing  Heart: No obvious jugular venous distension present  Abdomen: non - distended  Musculoskeltal: extremities normal, no peripheral edema  Skin: no suspicious lesions or rashes  Neuro: Alert, oriented, speech and mentation normal  Psych: affect and mood normal  Gait: Normal   Rectal: ~30cc gland, firm nodule on the LEFT apex, asymmetric    Component " PSA PSA Diag Urologic Phys   Latest Ref Rng & Units 0 - 4 ug/L 0.00 - 4.00 ng/mL   8/17/2020 22.80 (H)    9/25/2020  30.50 (H)       PATHOLOGY:  FINAL DIAGNOSIS:   A.  Prostate, left lateral base , biopsy   - Benign prostatic tissue. Negative for malignancy     B.  Prostate, left lateral mid, biopsy   - Prostatic adenocarcinoma, acinar type, Kremmling's grade 3+ 3 = 6, grade   group is1, number of cores involved   is 1 of 1, total surface area involved is <5 %,perineural invasion is not   identified     C.  Prostate, left lateral apex, biopsy   - Prostatic adenocarcinoma, acinar type, Wolfgang's grade 4 +4 = 8, grade   group is 4, number of cores involved   is 1 of 1, total surface area involved is 40 %,perineural invasion is not   identified     D.  Prostate, left base, biopsy   - Benign prostatic tissue. Negative for malignancy     E.  Prostate, left mid, biopsy   - Prostatic adenocarcinoma, acinar type, Wolfgang's grade 4 + 5 = 9, grade   group is 5, number of cores involved   is 1 of 1, total surface area involved is 75 %,perineural invasion is   identified. Percentage of grade 4 = 90%,   percentage of grade 5 = 10% (Please see comment)     F.  Prostate, left apex, biopsy   - Prostatic adenocarcinoma, acinar type, Wolfgang's grade 4 + 5 = 9, grade   group is 5, number of cores involved   is 1 of 1, total surface area involved is 90 %,perineural invasion is   identified. Percentage of grade 4 = 95%,   percentage of grade 5 = 5% (Please see comment)     G.  Prostate, right lateral base, biopsy   - Benign prostatic tissue. Negative for malignancy     H.  Prostate, right lateral mid, biopsy   - Benign prostatic tissue. Negative for malignancy     I.  Prostate, right lateral apex, biopsy   -Focal high grade prostatic intraepithelial neoplasia. Negative for   invasive malignancy.     J.  Prostate, right base, biopsy   - Benign prostatic tissue. Negative for malignancy     K.  Prostate, right mid, biopsy   - Benign  prostatic tissue. Negative for malignan.cy     L.  Prostate, right apex, biopsy   - Focal high-grade prostatic intraepithelial neoplasia. Negative for   invasive malignancy.     M. Prostate, left [place] lesion x3, biopsy-   - Prostatic adenocarcinoma, acinar type, Clovis's grade 4 + 5 = 9, grade   group is 5, number of cores involved   is 3 of 3, total surface area involved is 95 %,perineural invasion is   identified. Percentage of grade 4 = 95%,   percentage of grade 5 = 5% (Please see comment)     RALP PATHOLOGY  FINAL DIAGNOSIS:   A.  Specimen type, Procedure:  Prostate gland and attached bilateral   seminal vesicles, robotic assisted   radical prostatectomy with seminal vesicles     Histologic type:  Acinar     Histologic Grade Group and Wolfgang Score:  WHO Grade Group 5;  [place]   score 9 (5+4),  Tertiary pattern grade   3.     Percentage of Pattern 4 in [place] score - Approximately 40%     Tumor Quantitation:   Adenocarcinoma present in 16 of 24 blocks of tissue   examined     Extraprostatic Extension:  Not identified     Urinary Bladder Neck Invasion:  Not identified     Seminal Vesicle Invasion:  Not identified     Lymphovascular Invasin:  Not identified     Perineural Invasion:  Multiple foci present     Margins:  Uninvolved     Treatment Effect:   - No known presurgical therapy     Regional Lymph Nodes:  Seven bilateral pelvic lymph nodes negative for   metastatic tumor, specimen [id number]     Pathologic Stage Classification:  pT2  pN0  pM - Not applicable     B.  Bilateral pelvic lymph nodes, dissection:   - Seven benign lymph nodes, each negative for metastatic tumor.     IMAGING:  All pertinent imaging reviewed:    All imaging studies reviewed by me.  I personally reviewed these imaging films.  A formal report from radiology will follow.    FINDINGS:  Size: 3.5 x 4.8 x 3.8 cm  Volume: 33.2 mL  Hemorrhage: Absent  Peripheral zone: Heterogeneous on T2-weighted images. Suspicious  lesions as detailed  below.  Transition zone: Nonenlarged. Transition zone nodules which are  circumscribed or mostly encapsulated without diffusion restriction.   PI-RADS 2.  No highly suspicious nodules.     Lesion(s) in rank order of severity (highest score- to lowest score,  then by size)      Lesion 1:  Location: Left apex peripheral zone at the 5-6 o'clock position  relative to the urethra. Series 5 image 62.   Additional prostate regions involved: There is abutment of the  external urethral sphincter.  Size: 28 mm  T2 description: Moderate hypointense  T2 numerical assessment: 5  DWI description: Hypointense on ADC and hyperintense on high b-value  DWI  DWI numerical assessment: 5  DCE assessment: Positive    Prostate margin: Capsular abutment 6-15 mm with focal bulging. The  mass is also extending anteriorly and abutting the external urinary  sphincter (series 17 image 18) as well as extending superiorly is a  linear band invading the inferomedial bilateral seminal vesicles  (series 17 image 10).  Lesion overall PI-RADS category: 5     Neurovascular bundles: No neurovascular bundle involvement by  malignancy.  Seminal vesicles: Both seminal vesicles are involved by malignancy.  Lymph nodes: No lymph node involvement  Bones: No suspicious lesions  Other pelvic organs: No additional findings.                                                                   IMPRESSION:  1. Based on the most suspicious abnormality, this exam is  characterized as PIRADS 5 - Clinically significant cancer is highly  likely to be present.  The most suspicious abnormality is located at  the left apex peripheral zone at the 5-6 o'clock position relative to  the urethra and there is capsular bulging indicating moderately high  suspicion of minimal extraprostatic extension. The mass is also  extending anteriorly and abutting the external urinary sphincter as  well as extending superiorly and invading the inferomedial seminal  vesicles.  2. No suspicious  Fellow adenopathy or evidence of pelvic metastases.    ASSESSMENT and PLAN  64 year old man with PSA 30.50 and Wolfgang 5+4=9 prostate cancer with MRI showing PIRADs 5 lesion with high suspicion of extra-prostatic extension and possible SV invasion. Now s/p RALP/BPLND on 12/14/20 with pT2N0 disease, margins negative     - Faye removal today  - [facility] referral  - PSA in 3 months  - Pathology discussed       I spent over 15 minutes with the patient.  Over half this time was spent on counseling regarding the above.    Dov Tellez MD   Urology  [facility]  [facility] Phone: [phone]  [facility] Phone: [phone]

## 2023-11-07 NOTE — PROGRESS NOTE ADULT - SUBJECTIVE AND OBJECTIVE BOX
S: FS reviewed   O: Vital Signs Last 24 Hrs  T(C): 37.3 (07 Nov 2023 10:00), Max: 37.3 (07 Nov 2023 10:00)  T(F): 99.2 (07 Nov 2023 10:00), Max: 99.2 (07 Nov 2023 10:00)  HR: 109 (07 Nov 2023 12:00) (77 - 109)  BP: 99/53 (07 Nov 2023 08:00) (90/62 - 110/71)  BP(mean): 68 (07 Nov 2023 08:00) (68 - 85)  RR: 20 (07 Nov 2023 08:00) (20 - 25)  SpO2: 99% (07 Nov 2023 12:00) (97% - 100%)    Parameters below as of 07 Nov 2023 12:00  Patient On (Oxygen Delivery Method): room air          CAPILLARY BLOOD GLUCOSE      POCT Blood Glucose.: 198 mg/dL (07 Nov 2023 10:55)  POCT Blood Glucose.: 151 mg/dL (07 Nov 2023 07:34)  POCT Blood Glucose.: 69 mg/dL (07 Nov 2023 06:50)  POCT Blood Glucose.: 198 mg/dL (07 Nov 2023 02:23)  POCT Blood Glucose.: 67 mg/dL (07 Nov 2023 01:50)  POCT Blood Glucose.: 370 mg/dL (06 Nov 2023 22:13)  POCT Blood Glucose.: 136 mg/dL (06 Nov 2023 16:23)      11-07    141  |  107  |  7<L>  ----------------------------<  61<L>  3.5   |  21  |  0.5<L>    Ca    8.9      07 Nov 2023 05:16  Mg     1.8     11-07    TPro  6.3  /  Alb  4.2  /  TBili  <0.2  /  DBili  x   /  AST  11  /  ALT  7   /  AlkPhos  73  11-06          MEDICATIONS  (STANDING):  cefTRIAXone   IVPB 1000 milliGRAM(s) IV Intermittent every 24 hours  chlorhexidine 2% Cloths 1 Application(s) Topical <User Schedule>  dextrose 5%. 1000 milliLiter(s) (100 mL/Hr) IV Continuous <Continuous>  dextrose 5%. 1000 milliLiter(s) (100 mL/Hr) IV Continuous <Continuous>  dextrose 5%. 1000 milliLiter(s) (50 mL/Hr) IV Continuous <Continuous>  dextrose 5%. 1000 milliLiter(s) (100 mL/Hr) IV Continuous <Continuous>  dextrose 5%. 1000 milliLiter(s) (50 mL/Hr) IV Continuous <Continuous>  dextrose 50% Injectable 25 Gram(s) IV Push once  dextrose 50% Injectable 12.5 Gram(s) IV Push once  dextrose 50% Injectable 25 Gram(s) IV Push once  dextrose 50% Injectable 25 Gram(s) IV Push once  dextrose 50% Injectable 12.5 Gram(s) IV Push once  dextrose 50% Injectable 25 Gram(s) IV Push once  enoxaparin Injectable 40 milliGRAM(s) SubCutaneous every 24 hours  glucagon  Injectable 1 milliGRAM(s) IntraMuscular once  glucagon  Injectable 1 milliGRAM(s) IntraMuscular once  glucagon  Injectable 1 milliGRAM(s) IntraMuscular once  ibuprofen  Tablet. 400 milliGRAM(s) Oral once  influenza   Vaccine 0.5 milliLiter(s) IntraMuscular once  insulin glargine Injectable (LANTUS) 22 Unit(s) SubCutaneous every morning  insulin lispro (ADMELOG) corrective regimen sliding scale   SubCutaneous three times a day before meals  insulin lispro Injectable (ADMELOG) 5 Unit(s) SubCutaneous three times a day before meals  polyethylene glycol 3350 17 Gram(s) Oral every 12 hours  senna 2 Tablet(s) Oral at bedtime    MEDICATIONS  (PRN):  dextrose Oral Gel 15 Gram(s) Oral once PRN Blood Glucose LESS THAN 70 milliGRAM(s)/deciliter  dextrose Oral Gel 15 Gram(s) Oral once PRN Blood Glucose LESS THAN 70 milliGRAM(s)/deciliter

## 2023-11-07 NOTE — PROGRESS NOTE ADULT - SUBJECTIVE AND OBJECTIVE BOX
Patient is a 23y old  Female who presents with a chief complaint of DKA (06 Nov 2023 20:41)        Over Night Events: AG closed. On room air    ROS:     All ROS are negative except HPI         PHYSICAL EXAM    ICU Vital Signs Last 24 Hrs  T(C): 36.9 (07 Nov 2023 08:00), Max: 36.9 (07 Nov 2023 00:00)  T(F): 98.4 (07 Nov 2023 08:00), Max: 98.4 (07 Nov 2023 00:00)  HR: 97 (07 Nov 2023 08:00) (77 - 105)  BP: 99/53 (07 Nov 2023 08:00) (90/62 - 110/71)  BP(mean): 68 (07 Nov 2023 08:00) (68 - 85)  ABP: --  ABP(mean): --  RR: 20 (07 Nov 2023 08:00) (20 - 25)  SpO2: 100% (07 Nov 2023 08:00) (97% - 100%)    O2 Parameters below as of 07 Nov 2023 08:00  Patient On (Oxygen Delivery Method): room air          ENT: Airway patent,  EYES: Pupils equal, Round and reactive to light.  CARDIAC: Normal rate, Regular rhythm.    RESPIRATORY: No wheezing, Bilateral BS, Not tachypneic, No use of accessory muscles  GASTROINTESTINAL: Abdomen soft, Non-tender, No guarding,   NEUROLOGICAL: Alert and oriented   SKIN: Skin normal color for race, Warm and dry and intact.       11-06-23 @ 07:01  -  11-07-23 @ 07:00  --------------------------------------------------------  IN:    dextrose 5% + sodium chloride 0.45% w/ Additives: 1000 mL    Insulin: 5 mL    IV PiggyBack: 100 mL  Total IN: 1105 mL    OUT:    Voided (mL): 2 mL  Total OUT: 2 mL    Total NET: 1103 mL      11-07-23 @ 07:01  -  11-07-23 @ 10:06  --------------------------------------------------------  IN:    dextrose 5% + sodium chloride 0.45% w/ Additives: 100 mL  Total IN: 100 mL    OUT:  Total OUT: 0 mL    Total NET: 100 mL        LABS:                            12.2   7.00  )-----------( 296      ( 07 Nov 2023 05:16 )             37.1                                               11-07    141  |  107  |  7<L>  ----------------------------<  61<L>  3.5   |  21  |  0.5<L>    Ca    8.9      07 Nov 2023 05:16  Mg     1.8     11-07    TPro  6.3  /  Alb  4.2  /  TBili  <0.2  /  DBili  x   /  AST  11  /  ALT  7   /  AlkPhos  73  11-06      PT/INR - ( 06 Nov 2023 04:52 )   PT: 11.90 sec;   INR: 1.04 ratio         PTT - ( 06 Nov 2023 04:52 )  PTT:28.4 sec                                       Urinalysis Basic - ( 07 Nov 2023 05:16 )    Color: x / Appearance: x / SG: x / pH: x  Gluc: 61 mg/dL / Ketone: x  / Bili: x / Urobili: x   Blood: x / Protein: x / Nitrite: x   Leuk Esterase: x / RBC: x / WBC x   Sq Epi: x / Non Sq Epi: x / Bacteria: x        CARDIAC MARKERS ( 05 Nov 2023 20:27 )  x     / <0.01 ng/mL / x     / x     / x                                                LIVER FUNCTIONS - ( 06 Nov 2023 04:52 )  Alb: 4.2 g/dL / Pro: 6.3 g/dL / ALK PHOS: 73 U/L / ALT: 7 U/L / AST: 11 U/L / GGT: x                                                                                                                                       MEDICATIONS  (STANDING):  cefTRIAXone   IVPB 1000 milliGRAM(s) IV Intermittent every 24 hours  chlorhexidine 2% Cloths 1 Application(s) Topical <User Schedule>  dextrose 5% + sodium chloride 0.45% with potassium chloride 20 mEq/L 1000 milliLiter(s) (50 mL/Hr) IV Continuous <Continuous>  dextrose 5%. 1000 milliLiter(s) (100 mL/Hr) IV Continuous <Continuous>  dextrose 5%. 1000 milliLiter(s) (100 mL/Hr) IV Continuous <Continuous>  dextrose 5%. 1000 milliLiter(s) (50 mL/Hr) IV Continuous <Continuous>  dextrose 5%. 1000 milliLiter(s) (100 mL/Hr) IV Continuous <Continuous>  dextrose 5%. 1000 milliLiter(s) (50 mL/Hr) IV Continuous <Continuous>  dextrose 50% Injectable 25 Gram(s) IV Push once  dextrose 50% Injectable 12.5 Gram(s) IV Push once  dextrose 50% Injectable 25 Gram(s) IV Push once  dextrose 50% Injectable 25 Gram(s) IV Push once  dextrose 50% Injectable 12.5 Gram(s) IV Push once  dextrose 50% Injectable 25 Gram(s) IV Push once  enoxaparin Injectable 40 milliGRAM(s) SubCutaneous every 24 hours  glucagon  Injectable 1 milliGRAM(s) IntraMuscular once  glucagon  Injectable 1 milliGRAM(s) IntraMuscular once  glucagon  Injectable 1 milliGRAM(s) IntraMuscular once  ibuprofen  Tablet. 400 milliGRAM(s) Oral once  influenza   Vaccine 0.5 milliLiter(s) IntraMuscular once  insulin glargine Injectable (LANTUS) 22 Unit(s) SubCutaneous every morning  insulin lispro (ADMELOG) corrective regimen sliding scale   SubCutaneous three times a day before meals  insulin lispro Injectable (ADMELOG) 5 Unit(s) SubCutaneous three times a day before meals  polyethylene glycol 3350 17 Gram(s) Oral every 12 hours  senna 2 Tablet(s) Oral at bedtime    MEDICATIONS  (PRN):  dextrose Oral Gel 15 Gram(s) Oral once PRN Blood Glucose LESS THAN 70 milliGRAM(s)/deciliter  dextrose Oral Gel 15 Gram(s) Oral once PRN Blood Glucose LESS THAN 70 milliGRAM(s)/deciliter

## 2023-11-07 NOTE — PROGRESS NOTE ADULT - SUBJECTIVE AND OBJECTIVE BOX
HPI:  23 year old female with pmhx of DM type 1 (Uncontrolled, follows Dr. Cornelius) and Hashimoto thyroiditis (Controlled) presents to ed with cough, myalgias, and hyperglycemia x 2 days. She took a covid test at home and it was positive. Pt states her glucose has been elevated in the 400s, has been giving herself more insulin which is still NOT controlling her blood sugar. Pt denies nausea, vomiting, diarrhea, abd pain, chest pain, calf pain or swelling, or recent travel/sick contacts.    Endorses constipation, No BM since 1-2 weeks.    In the ED, BHB +ve, admitted to ICU for DKA.    Vital Signs Last 24 Hrs:  T(F): 98.2 (2023 18:12), Max: 98.2 (2023 18:12)  HR: 112 (2023 18:12) (112 - 112)  BP: 132/62 (2023 18:12) (132/62 - 132/62)  RR: 18 (2023 18:12) (18 - 18)  SpO2: 98% (2023 18:12) (98% - 98%) on RA   (2023 21:37)      INTERVAL HISTORY:    PAST MEDICAL & SURGICAL HISTORY  DM (diabetes mellitus) type 1 with ketoacidosis    Hashimoto's thyroiditis    History of tonsillectomy        ALLERGIES:  No Known Allergies      MEDICATIONS:  MEDICATIONS  (STANDING):  cefTRIAXone   IVPB 1000 milliGRAM(s) IV Intermittent every 24 hours  chlorhexidine 2% Cloths 1 Application(s) Topical <User Schedule>  dextrose 5%. 1000 milliLiter(s) (100 mL/Hr) IV Continuous <Continuous>  dextrose 5%. 1000 milliLiter(s) (100 mL/Hr) IV Continuous <Continuous>  dextrose 5%. 1000 milliLiter(s) (50 mL/Hr) IV Continuous <Continuous>  dextrose 5%. 1000 milliLiter(s) (100 mL/Hr) IV Continuous <Continuous>  dextrose 5%. 1000 milliLiter(s) (50 mL/Hr) IV Continuous <Continuous>  dextrose 50% Injectable 25 Gram(s) IV Push once  dextrose 50% Injectable 12.5 Gram(s) IV Push once  dextrose 50% Injectable 25 Gram(s) IV Push once  dextrose 50% Injectable 25 Gram(s) IV Push once  dextrose 50% Injectable 12.5 Gram(s) IV Push once  dextrose 50% Injectable 25 Gram(s) IV Push once  enoxaparin Injectable 40 milliGRAM(s) SubCutaneous every 24 hours  glucagon  Injectable 1 milliGRAM(s) IntraMuscular once  glucagon  Injectable 1 milliGRAM(s) IntraMuscular once  glucagon  Injectable 1 milliGRAM(s) IntraMuscular once  ibuprofen  Tablet. 400 milliGRAM(s) Oral once  influenza   Vaccine 0.5 milliLiter(s) IntraMuscular once  insulin glargine Injectable (LANTUS) 22 Unit(s) SubCutaneous every morning  insulin lispro (ADMELOG) corrective regimen sliding scale   SubCutaneous three times a day before meals  insulin lispro Injectable (ADMELOG) 5 Unit(s) SubCutaneous three times a day before meals  polyethylene glycol 3350 17 Gram(s) Oral every 12 hours  senna 2 Tablet(s) Oral at bedtime    MEDICATIONS  (PRN):  dextrose Oral Gel 15 Gram(s) Oral once PRN Blood Glucose LESS THAN 70 milliGRAM(s)/deciliter  dextrose Oral Gel 15 Gram(s) Oral once PRN Blood Glucose LESS THAN 70 milliGRAM(s)/deciliter      HOME MEDICATIONS:  Home Medications:  Afrezza 12 units inhalation powder: 12 unit(s) inhaled 3 times a day (2023 22:24)  Toujeo SoloStar 300 units/mL subcutaneous solution: 30 international unit(s) subcutaneous once a day (2023 22:24)        OBJECTIVE:  ICU Vital Signs Last 24 Hrs  T(C): 37.3 (2023 10:00), Max: 37.3 (2023 10:00)  T(F): 99.2 (2023 10:00), Max: 99.2 (2023 10:00)  HR: 107 (2023 10:00) (77 - 107)  BP: 99/53 (2023 08:00) (90/62 - 110/71)  BP(mean): 68 (2023 08:00) (68 - 85)  ABP: --  ABP(mean): --  RR: 20 (2023 08:00) (20 - 25)  SpO2: 97% (2023 10:00) (97% - 100%)    O2 Parameters below as of 2023 08:00  Patient On (Oxygen Delivery Method): room air    2023 07:01  -  2023 07:00  --------------------------------------------------------  IN: 1105 mL / OUT: 2 mL / NET: 1103 mL    2023 07:01  -  2023 12:35  --------------------------------------------------------  IN: 150 mL / OUT: 0 mL / NET: 150 mL      Daily     Daily Weight in k (2023 06:00)    PHYSICAL EXAM:  CONST: Well appearing, NAD  ENT: Airway patent  EYES: Pupils equal, Round and reactive to light.  CARDIAC: Normal rate, Regular rhythm.    RESPIRATORY: No wheezing, Bilateral BS, Not tachypneic, No use of accessory muscles  GASTROINTESTINAL: Abdomen soft, Non-tender, No guarding,   NEUROLOGICAL: Alert and oriented   SKIN: Skin normal color for race, Warm and dry and intact.    LABS:                        12.2   7.00  )-----------( 296      ( 2023 05:16 )             37.1         141  |  107  |  7<L>  ----------------------------<  61<L>  3.5   |  21  |  0.5<L>    Ca    8.9      2023 05:16  Mg     1.8         TPro  6.3  /  Alb  4.2  /  TBili  <0.2  /  DBili  x   /  AST  11  /  ALT  7   /  AlkPhos  73      PT/INR - ( 2023 04:52 )   PT: 11.90 sec;   INR: 1.04 ratio         PTT - ( 2023 04:52 )  PTT:28.4 sec    CARDIAC MARKERS ( 2023 20:27 )  x     / <0.01 ng/mL / x     / x     / x            Troponin trend:      11-06 Chol 168 LDL -- HDL 44<L> Trig 122      RADIOLOGY:  -CXR:      ECG:    TELEMETRY EVENTS:

## 2023-11-08 LAB
ALBUMIN SERPL ELPH-MCNC: 3.8 G/DL — SIGNIFICANT CHANGE UP (ref 3.5–5.2)
ALBUMIN SERPL ELPH-MCNC: 3.8 G/DL — SIGNIFICANT CHANGE UP (ref 3.5–5.2)
ALBUMIN SERPL ELPH-MCNC: 4 G/DL — SIGNIFICANT CHANGE UP (ref 3.5–5.2)
ALBUMIN SERPL ELPH-MCNC: 4 G/DL — SIGNIFICANT CHANGE UP (ref 3.5–5.2)
ALP SERPL-CCNC: 162 U/L — HIGH (ref 30–115)
ALP SERPL-CCNC: 162 U/L — HIGH (ref 30–115)
ALP SERPL-CCNC: 171 U/L — HIGH (ref 30–115)
ALP SERPL-CCNC: 171 U/L — HIGH (ref 30–115)
ALT FLD-CCNC: 436 U/L — HIGH (ref 0–41)
ALT FLD-CCNC: 436 U/L — HIGH (ref 0–41)
ALT FLD-CCNC: 442 U/L — HIGH (ref 0–41)
ALT FLD-CCNC: 442 U/L — HIGH (ref 0–41)
ANION GAP SERPL CALC-SCNC: 8 MMOL/L — SIGNIFICANT CHANGE UP (ref 7–14)
ANION GAP SERPL CALC-SCNC: 8 MMOL/L — SIGNIFICANT CHANGE UP (ref 7–14)
APAP SERPL-MCNC: <5 UG/ML — LOW (ref 10–30)
APAP SERPL-MCNC: <5 UG/ML — LOW (ref 10–30)
AST SERPL-CCNC: 725 U/L — HIGH (ref 0–41)
AST SERPL-CCNC: 725 U/L — HIGH (ref 0–41)
AST SERPL-CCNC: 851 U/L — HIGH (ref 0–41)
AST SERPL-CCNC: 851 U/L — HIGH (ref 0–41)
BASOPHILS # BLD AUTO: 0.03 K/UL — SIGNIFICANT CHANGE UP (ref 0–0.2)
BASOPHILS # BLD AUTO: 0.03 K/UL — SIGNIFICANT CHANGE UP (ref 0–0.2)
BASOPHILS NFR BLD AUTO: 0.9 % — SIGNIFICANT CHANGE UP (ref 0–1)
BASOPHILS NFR BLD AUTO: 0.9 % — SIGNIFICANT CHANGE UP (ref 0–1)
BILIRUB DIRECT SERPL-MCNC: <0.2 MG/DL — SIGNIFICANT CHANGE UP (ref 0–0.3)
BILIRUB DIRECT SERPL-MCNC: <0.2 MG/DL — SIGNIFICANT CHANGE UP (ref 0–0.3)
BILIRUB INDIRECT FLD-MCNC: >0.1 MG/DL — LOW (ref 0.2–1.2)
BILIRUB INDIRECT FLD-MCNC: >0.1 MG/DL — LOW (ref 0.2–1.2)
BILIRUB SERPL-MCNC: 0.3 MG/DL — SIGNIFICANT CHANGE UP (ref 0.2–1.2)
BUN SERPL-MCNC: 8 MG/DL — LOW (ref 10–20)
BUN SERPL-MCNC: 8 MG/DL — LOW (ref 10–20)
CALCIUM SERPL-MCNC: 8.9 MG/DL — SIGNIFICANT CHANGE UP (ref 8.4–10.5)
CALCIUM SERPL-MCNC: 8.9 MG/DL — SIGNIFICANT CHANGE UP (ref 8.4–10.5)
CHLORIDE SERPL-SCNC: 105 MMOL/L — SIGNIFICANT CHANGE UP (ref 98–110)
CHLORIDE SERPL-SCNC: 105 MMOL/L — SIGNIFICANT CHANGE UP (ref 98–110)
CK SERPL-CCNC: 30 U/L — SIGNIFICANT CHANGE UP (ref 0–225)
CK SERPL-CCNC: 30 U/L — SIGNIFICANT CHANGE UP (ref 0–225)
CO2 SERPL-SCNC: 26 MMOL/L — SIGNIFICANT CHANGE UP (ref 17–32)
CO2 SERPL-SCNC: 26 MMOL/L — SIGNIFICANT CHANGE UP (ref 17–32)
CREAT SERPL-MCNC: <0.5 MG/DL — LOW (ref 0.7–1.5)
CREAT SERPL-MCNC: <0.5 MG/DL — LOW (ref 0.7–1.5)
EGFR: 144 ML/MIN/1.73M2 — SIGNIFICANT CHANGE UP
EGFR: 144 ML/MIN/1.73M2 — SIGNIFICANT CHANGE UP
EOSINOPHIL # BLD AUTO: 0.15 K/UL — SIGNIFICANT CHANGE UP (ref 0–0.7)
EOSINOPHIL # BLD AUTO: 0.15 K/UL — SIGNIFICANT CHANGE UP (ref 0–0.7)
EOSINOPHIL NFR BLD AUTO: 5.1 % — SIGNIFICANT CHANGE UP (ref 0–8)
EOSINOPHIL NFR BLD AUTO: 5.1 % — SIGNIFICANT CHANGE UP (ref 0–8)
GLUCOSE BLDC GLUCOMTR-MCNC: 115 MG/DL — HIGH (ref 70–99)
GLUCOSE BLDC GLUCOMTR-MCNC: 115 MG/DL — HIGH (ref 70–99)
GLUCOSE BLDC GLUCOMTR-MCNC: 153 MG/DL — HIGH (ref 70–99)
GLUCOSE BLDC GLUCOMTR-MCNC: 153 MG/DL — HIGH (ref 70–99)
GLUCOSE BLDC GLUCOMTR-MCNC: 303 MG/DL — HIGH (ref 70–99)
GLUCOSE BLDC GLUCOMTR-MCNC: 303 MG/DL — HIGH (ref 70–99)
GLUCOSE BLDC GLUCOMTR-MCNC: 49 MG/DL — CRITICAL LOW (ref 70–99)
GLUCOSE BLDC GLUCOMTR-MCNC: 49 MG/DL — CRITICAL LOW (ref 70–99)
GLUCOSE SERPL-MCNC: 132 MG/DL — HIGH (ref 70–99)
GLUCOSE SERPL-MCNC: 132 MG/DL — HIGH (ref 70–99)
HCT VFR BLD CALC: 37.8 % — SIGNIFICANT CHANGE UP (ref 37–47)
HCT VFR BLD CALC: 37.8 % — SIGNIFICANT CHANGE UP (ref 37–47)
HGB BLD-MCNC: 12.4 G/DL — SIGNIFICANT CHANGE UP (ref 12–16)
HGB BLD-MCNC: 12.4 G/DL — SIGNIFICANT CHANGE UP (ref 12–16)
LYMPHOCYTES # BLD AUTO: 1.23 K/UL — SIGNIFICANT CHANGE UP (ref 1.2–3.4)
LYMPHOCYTES # BLD AUTO: 1.23 K/UL — SIGNIFICANT CHANGE UP (ref 1.2–3.4)
LYMPHOCYTES # BLD AUTO: 41.9 % — SIGNIFICANT CHANGE UP (ref 20.5–51.1)
LYMPHOCYTES # BLD AUTO: 41.9 % — SIGNIFICANT CHANGE UP (ref 20.5–51.1)
MAGNESIUM SERPL-MCNC: 2 MG/DL — SIGNIFICANT CHANGE UP (ref 1.8–2.4)
MAGNESIUM SERPL-MCNC: 2 MG/DL — SIGNIFICANT CHANGE UP (ref 1.8–2.4)
MCHC RBC-ENTMCNC: 30.7 PG — SIGNIFICANT CHANGE UP (ref 27–31)
MCHC RBC-ENTMCNC: 30.7 PG — SIGNIFICANT CHANGE UP (ref 27–31)
MCHC RBC-ENTMCNC: 32.8 G/DL — SIGNIFICANT CHANGE UP (ref 32–37)
MCHC RBC-ENTMCNC: 32.8 G/DL — SIGNIFICANT CHANGE UP (ref 32–37)
MCV RBC AUTO: 93.6 FL — SIGNIFICANT CHANGE UP (ref 81–99)
MCV RBC AUTO: 93.6 FL — SIGNIFICANT CHANGE UP (ref 81–99)
MONOCYTES # BLD AUTO: 0.4 K/UL — SIGNIFICANT CHANGE UP (ref 0.1–0.6)
MONOCYTES # BLD AUTO: 0.4 K/UL — SIGNIFICANT CHANGE UP (ref 0.1–0.6)
MONOCYTES NFR BLD AUTO: 13.7 % — HIGH (ref 1.7–9.3)
MONOCYTES NFR BLD AUTO: 13.7 % — HIGH (ref 1.7–9.3)
NEUTROPHILS # BLD AUTO: 1.13 K/UL — LOW (ref 1.4–6.5)
NEUTROPHILS # BLD AUTO: 1.13 K/UL — LOW (ref 1.4–6.5)
NEUTROPHILS NFR BLD AUTO: 36.7 % — LOW (ref 42.2–75.2)
NEUTROPHILS NFR BLD AUTO: 36.7 % — LOW (ref 42.2–75.2)
PHOSPHATE SERPL-MCNC: 4.1 MG/DL — SIGNIFICANT CHANGE UP (ref 2.1–4.9)
PHOSPHATE SERPL-MCNC: 4.1 MG/DL — SIGNIFICANT CHANGE UP (ref 2.1–4.9)
PLATELET # BLD AUTO: 294 K/UL — SIGNIFICANT CHANGE UP (ref 130–400)
PLATELET # BLD AUTO: 294 K/UL — SIGNIFICANT CHANGE UP (ref 130–400)
PMV BLD: 9.9 FL — SIGNIFICANT CHANGE UP (ref 7.4–10.4)
PMV BLD: 9.9 FL — SIGNIFICANT CHANGE UP (ref 7.4–10.4)
POTASSIUM SERPL-MCNC: 3.5 MMOL/L — SIGNIFICANT CHANGE UP (ref 3.5–5)
POTASSIUM SERPL-MCNC: 3.5 MMOL/L — SIGNIFICANT CHANGE UP (ref 3.5–5)
POTASSIUM SERPL-SCNC: 3.5 MMOL/L — SIGNIFICANT CHANGE UP (ref 3.5–5)
POTASSIUM SERPL-SCNC: 3.5 MMOL/L — SIGNIFICANT CHANGE UP (ref 3.5–5)
PROT SERPL-MCNC: 6.4 G/DL — SIGNIFICANT CHANGE UP (ref 6–8)
PROT SERPL-MCNC: 6.4 G/DL — SIGNIFICANT CHANGE UP (ref 6–8)
PROT SERPL-MCNC: 6.7 G/DL — SIGNIFICANT CHANGE UP (ref 6–8)
PROT SERPL-MCNC: 6.7 G/DL — SIGNIFICANT CHANGE UP (ref 6–8)
RBC # BLD: 4.04 M/UL — LOW (ref 4.2–5.4)
RBC # BLD: 4.04 M/UL — LOW (ref 4.2–5.4)
RBC # FLD: 12.1 % — SIGNIFICANT CHANGE UP (ref 11.5–14.5)
RBC # FLD: 12.1 % — SIGNIFICANT CHANGE UP (ref 11.5–14.5)
SODIUM SERPL-SCNC: 139 MMOL/L — SIGNIFICANT CHANGE UP (ref 135–146)
SODIUM SERPL-SCNC: 139 MMOL/L — SIGNIFICANT CHANGE UP (ref 135–146)
WBC # BLD: 2.94 K/UL — LOW (ref 4.8–10.8)
WBC # BLD: 2.94 K/UL — LOW (ref 4.8–10.8)
WBC # FLD AUTO: 2.94 K/UL — LOW (ref 4.8–10.8)
WBC # FLD AUTO: 2.94 K/UL — LOW (ref 4.8–10.8)

## 2023-11-08 PROCEDURE — 99223 1ST HOSP IP/OBS HIGH 75: CPT

## 2023-11-08 PROCEDURE — 99232 SBSQ HOSP IP/OBS MODERATE 35: CPT

## 2023-11-08 PROCEDURE — 99222 1ST HOSP IP/OBS MODERATE 55: CPT

## 2023-11-08 PROCEDURE — 71045 X-RAY EXAM CHEST 1 VIEW: CPT | Mod: 26

## 2023-11-08 PROCEDURE — 99231 SBSQ HOSP IP/OBS SF/LOW 25: CPT

## 2023-11-08 RX ORDER — INSULIN GLARGINE 100 [IU]/ML
20 INJECTION, SOLUTION SUBCUTANEOUS EVERY MORNING
Refills: 0 | Status: DISCONTINUED | OUTPATIENT
Start: 2023-11-08 | End: 2023-11-09

## 2023-11-08 RX ORDER — INSULIN LISPRO 100/ML
3 VIAL (ML) SUBCUTANEOUS
Refills: 0 | Status: DISCONTINUED | OUTPATIENT
Start: 2023-11-08 | End: 2023-11-09

## 2023-11-08 RX ORDER — INSULIN HUMAN 100 [IU]/ML
4 INJECTION, SOLUTION SUBCUTANEOUS ONCE
Refills: 0 | Status: DISCONTINUED | OUTPATIENT
Start: 2023-11-08 | End: 2023-11-08

## 2023-11-08 RX ORDER — DEXTROSE 50 % IN WATER 50 %
15 SYRINGE (ML) INTRAVENOUS ONCE
Refills: 0 | Status: DISCONTINUED | OUTPATIENT
Start: 2023-11-08 | End: 2023-11-09

## 2023-11-08 RX ORDER — NITROFURANTOIN MACROCRYSTAL 50 MG
100 CAPSULE ORAL
Refills: 0 | Status: DISCONTINUED | OUTPATIENT
Start: 2023-11-08 | End: 2023-11-09

## 2023-11-08 RX ORDER — INSULIN LISPRO 100/ML
4 VIAL (ML) SUBCUTANEOUS ONCE
Refills: 0 | Status: COMPLETED | OUTPATIENT
Start: 2023-11-08 | End: 2023-11-08

## 2023-11-08 RX ADMIN — OFLOXACIN OTIC SOLUTION 5 DROP(S): 3 SOLUTION/ DROPS AURICULAR (OTIC) at 06:44

## 2023-11-08 RX ADMIN — INSULIN GLARGINE 22 UNIT(S): 100 INJECTION, SOLUTION SUBCUTANEOUS at 08:30

## 2023-11-08 RX ADMIN — Medication 3 UNIT(S): at 18:58

## 2023-11-08 RX ADMIN — OFLOXACIN OTIC SOLUTION 5 DROP(S): 3 SOLUTION/ DROPS AURICULAR (OTIC) at 17:43

## 2023-11-08 RX ADMIN — Medication 100 MILLIGRAM(S): at 19:28

## 2023-11-08 RX ADMIN — Medication 4 UNIT(S): at 01:44

## 2023-11-08 RX ADMIN — Medication 2: at 13:13

## 2023-11-08 RX ADMIN — Medication 5 UNIT(S): at 08:32

## 2023-11-08 RX ADMIN — Medication 5 UNIT(S): at 13:14

## 2023-11-08 NOTE — DIETITIAN INITIAL EVALUATION ADULT - PERTINENT LABORATORY DATA
11-08    139  |  105  |  8<L>  ----------------------------<  132<H>  3.5   |  26  |  <0.5<L>    Ca    8.9      08 Nov 2023 04:30  Phos  4.1     11-08  Mg     2.0     11-08    TPro  6.4  /  Alb  3.8  /  TBili  0.3  /  DBili  x   /  AST  851<H>  /  ALT  436<H>  /  AlkPhos  162<H>  11-08  POCT Blood Glucose.: 115 mg/dL (11-08-23 @ 10:18)  A1C with Estimated Average Glucose Result: 10.2 % (11-07-23 @ 05:16)  A1C with Estimated Average Glucose Result: 10.4 % (11-06-23 @ 04:52)

## 2023-11-08 NOTE — CONSULT NOTE ADULT - ASSESSMENT
23 year old female with pmhx of DM type 1 (Uncontrolled, follows Dr. Cornelius) and Hashimoto thyroiditis (Controlled) presents to ed with cough, myalgias, and hyperglycemia x 2 days.  Admitted to MICU for DKA. Gap closed and patient downgraded to medical floor for further management. Hospitalization complicated by sudden elevation of LFT. Admitted for further management.   Hepatology consulted for Acute liver injury.     # Acute liver injury, Hepatocellular pattern AST > ALT.   LIVER FUNCTIONS - ( 08 Nov 2023 11:25 )  Alb: 4.0 g/dL / Pro: 6.7 g/dL / ALK PHOS: 171 U/L / ALT: 442 U/L / AST: 725 U/L / GGT: x         Normal LFT in 11/6   Started Rocephin 11/7   Nitrofurantion 9/23   Bactrim 4/23   Hx of AI disease (hashimoto thyroiditis) and DM type1     PLAN   Check CK level   RUQ US w doppler   Check Hepatitis A IgM, Hepatitis B core IgM, core Ab total, surface Ag, surface Ab, HCV antibody, HCV RNA, Anti HEV and ceruloplasmin  Send SAPNA, AMA, anti-Smooth Muscle Ab type 1, Anti liver-kidney microsomal Ab, Anti-soluble liver Ag, immunoglobulin panel. send CMV PCR, EBV PCR, HSV IgM  Please avoid hepatotoxic medications  Monitor LFTs daily  Will follow      
# uncontrolled type 1 DM / COVID +   - A1c 10.4% , on toujeo 30 units daily in am and Afrezza 12 units TIDAC  - now off insulin drip and AG closed   - continue with IV fluid   - increase toujeo to 22 units in am   - continue lispro 5 units TIDAC , and sliding scale  - will follow , discussed with mom switching from Afrezza to lispro SC but patient prefer to stay on Afrezza now   - she will follow up with Dr Frank on discharge 
Pt is a 22yo Female who presents with DKA/COVID - called to assess for bilateral ear pain. Pt with previous ear surgery on the right (tympanoplasty? tympanomastoidectomy?) with tympanostomy tube placement. Pt with right otitis externa, possible extruded/clogged tympanostomy tube on the left.     ·	rec floxin otic 5 drops q12h x 1 week to both ears (active infection in the right ear; drops will help to unclog tymp tube)  ·	dry ear precautions  ·	pt needs to f/u with her primary ENT for possible replacement of tubes and audiogram, mom prefers to follow with Dr Jones in [place]  ·	w/d with attng, will follow

## 2023-11-08 NOTE — DIETITIAN INITIAL EVALUATION ADULT - ORAL INTAKE PTA/DIET HISTORY
Pt unable to participate in nutrition assessment interview at time of visit. Will attempt to obtain nutrition hx at follow up. Hx limited to medical chart.

## 2023-11-08 NOTE — PROGRESS NOTE ADULT - SUBJECTIVE AND OBJECTIVE BOX
24H events:    Patient is a 23y old Female who presents with a chief complaint of DKA (08 Nov 2023 15:48)    Primary diagnosis of DKA, type 1    Today is hospital day 3d. This morning patient was seen and examined at bedside, resting comfortably in bed.    No acute or major events overnight.      PAST MEDICAL & SURGICAL HISTORY  DM (diabetes mellitus) type 1 with ketoacidosis    Hashimoto's thyroiditis    History of tonsillectomy    ALLERGIES:  No Known Allergies    MEDICATIONS:  STANDING MEDICATIONS  chlorhexidine 2% Cloths 1 Application(s) Topical <User Schedule>  dextrose 5%. 1000 milliLiter(s) IV Continuous <Continuous>  dextrose 5%. 1000 milliLiter(s) IV Continuous <Continuous>  dextrose 5%. 1000 milliLiter(s) IV Continuous <Continuous>  dextrose 5%. 1000 milliLiter(s) IV Continuous <Continuous>  dextrose 5%. 1000 milliLiter(s) IV Continuous <Continuous>  dextrose 50% Injectable 25 Gram(s) IV Push once  dextrose 50% Injectable 25 Gram(s) IV Push once  dextrose 50% Injectable 12.5 Gram(s) IV Push once  dextrose 50% Injectable 25 Gram(s) IV Push once  dextrose 50% Injectable 25 Gram(s) IV Push once  dextrose 50% Injectable 12.5 Gram(s) IV Push once  enoxaparin Injectable 40 milliGRAM(s) SubCutaneous every 24 hours  glucagon  Injectable 1 milliGRAM(s) IntraMuscular once  glucagon  Injectable 1 milliGRAM(s) IntraMuscular once  glucagon  Injectable 1 milliGRAM(s) IntraMuscular once  ibuprofen  Tablet. 400 milliGRAM(s) Oral once  influenza   Vaccine 0.5 milliLiter(s) IntraMuscular once  insulin glargine Injectable (LANTUS) 20 Unit(s) SubCutaneous every morning  insulin lispro Injectable (ADMELOG) 3 Unit(s) SubCutaneous three times a day before meals  nitrofurantoin monohydrate/macrocrystals (MACROBID) 100 milliGRAM(s) Oral two times a day  ofloxacin 0.3% Ophthalmic Solution for OTIC Use 5 Drop(s) Both Ears two times a day  polyethylene glycol 3350 17 Gram(s) Oral every 12 hours  senna 2 Tablet(s) Oral at bedtime    PRN MEDICATIONS  dextrose Oral Gel 15 Gram(s) Oral once PRN  dextrose Oral Gel 15 Gram(s) Oral once PRN  dextrose Oral Gel 15 Gram(s) Oral once PRN    VITALS:   T(F): 97.7  HR: 105  BP: 104/67  RR: 18  SpO2: 98%    PHYSICAL EXAM:  GENERAL:   ( X ) NAD, lying in bed comfortably     (  ) obtunded     (  ) lethargic     (  ) somnolent    HEAD:   ( X ) Atraumatic     (  ) hematoma     (  ) laceration (specify location:       )     NECK:  ( X ) Supple     (  ) neck stiffness     (  ) nuchal rigidity     (  )  no JVD     (  ) JVD present ( -- cm)    HEART:  Rate -->     ( X ) normal rate     (  ) bradycardic     (  ) tachycardic  Rhythm -->     ( X ) regular     (  ) regularly irregular     (  ) irregularly irregular  Murmurs -->     ( X ) normal s1s2     (  ) systolic murmur     (  ) diastolic murmur     (  ) continuous murmur      (  ) S3 present     (  ) S4 present    LUNGS:   ( X )Unlabored respirations     (  ) tachypnea  ( X ) B/L air entry     (  ) decreased breath sounds in:  (location     )    ( X ) no adventitious sound     (  ) crackles     (  ) wheezing      (  ) rhonchi      (specify location:       )  (  ) chest wall tenderness (specify location:       )    ABDOMEN:   ( X ) Soft     (  ) tense   |   (  ) nondistended     (  ) distended   |   (  ) +BS     (  ) hypoactive bowel sounds     (  ) hyperactive bowel sounds  ( X ) nontender     (  ) RUQ tenderness     (  ) RLQ tenderness     (  ) LLQ tenderness     (  ) epigastric tenderness     (  ) diffuse tenderness  (  ) Splenomegaly      (  ) Hepatomegaly      (  ) Jaundice     (  ) ecchymosis     EXTREMITIES:  ( X ) Normal     (  ) Rash     (  ) ecchymosis     (  ) varicose veins      (  ) pitting edema     (  ) non-pitting edema   (  ) ulceration     (  ) gangrene:     (location:     )    NERVOUS SYSTEM:    ( X ) A&Ox3     (  ) confused     (  ) lethargic    LABS:                        12.4   2.94  )-----------( 294      ( 08 Nov 2023 04:30 )             37.8     11-08    139  |  105  |  8<L>  ----------------------------<  132<H>  3.5   |  26  |  <0.5<L>    Ca    8.9      08 Nov 2023 04:30  Phos  4.1     11-08  Mg     2.0     11-08    TPro  6.7  /  Alb  4.0  /  TBili  0.3  /  DBili  <0.2  /  AST  725<H>  /  ALT  442<H>  /  AlkPhos  171<H>  11-08      Urinalysis Basic - ( 08 Nov 2023 04:30 )    Color: x / Appearance: x / SG: x / pH: x  Gluc: 132 mg/dL / Ketone: x  / Bili: x / Urobili: x   Blood: x / Protein: x / Nitrite: x   Leuk Esterase: x / RBC: x / WBC x   Sq Epi: x / Non Sq Epi: x / Bacteria: x

## 2023-11-08 NOTE — PROGRESS NOTE ADULT - SUBJECTIVE AND OBJECTIVE BOX
MEDICINE ATTENDING PROGRESS NOTE  Pt is a 24yo Female who presents with myalgia, cough, fever, found to have COVID, and hyperglycemia. Admitted to MICU for DKA. Gap closed and patient downgraded to medical floor for further management. Hospitalizatoin complicated by sudden elevation of LFT. Admitted for further management.     Interval/Overnight Events  - Will repeat LFTs -> if abnromal, will get US liver with doppler and hepatology consult.  - if labs wnl, we could dc patient      ROS  General: Denies fevers, chills, nightsweats, weight loss  HEENT: Denies headache, rhinorrhea, sore throat, eye pain  CV: Denies CP, palpitations  PULM: Denies SOB, cough  GI: Denies abdominal pain, diarrhea  : Denies dysuria, hematuria  MSK: Denies arthralgias  SKIN: Denies rash   NEURO: Denies paresthesias, weakness  PSYCH: Denies depression    MEDICATIONS  (STANDING):  cefTRIAXone   IVPB 1000 milliGRAM(s) IV Intermittent every 24 hours  chlorhexidine 2% Cloths 1 Application(s) Topical <User Schedule>  dextrose 5%. 1000 milliLiter(s) (50 mL/Hr) IV Continuous <Continuous>  dextrose 5%. 1000 milliLiter(s) (100 mL/Hr) IV Continuous <Continuous>  dextrose 5%. 1000 milliLiter(s) (100 mL/Hr) IV Continuous <Continuous>  dextrose 5%. 1000 milliLiter(s) (50 mL/Hr) IV Continuous <Continuous>  dextrose 5%. 1000 milliLiter(s) (100 mL/Hr) IV Continuous <Continuous>  dextrose 50% Injectable 25 Gram(s) IV Push once  dextrose 50% Injectable 12.5 Gram(s) IV Push once  dextrose 50% Injectable 25 Gram(s) IV Push once  dextrose 50% Injectable 25 Gram(s) IV Push once  dextrose 50% Injectable 25 Gram(s) IV Push once  dextrose 50% Injectable 12.5 Gram(s) IV Push once  enoxaparin Injectable 40 milliGRAM(s) SubCutaneous every 24 hours  glucagon  Injectable 1 milliGRAM(s) IntraMuscular once  glucagon  Injectable 1 milliGRAM(s) IntraMuscular once  glucagon  Injectable 1 milliGRAM(s) IntraMuscular once  ibuprofen  Tablet. 400 milliGRAM(s) Oral once  influenza   Vaccine 0.5 milliLiter(s) IntraMuscular once  insulin glargine Injectable (LANTUS) 22 Unit(s) SubCutaneous every morning  insulin lispro (ADMELOG) corrective regimen sliding scale   SubCutaneous three times a day before meals  insulin lispro Injectable (ADMELOG) 5 Unit(s) SubCutaneous three times a day before meals  ofloxacin 0.3% Ophthalmic Solution for OTIC Use 5 Drop(s) Both Ears two times a day  polyethylene glycol 3350 17 Gram(s) Oral every 12 hours  senna 2 Tablet(s) Oral at bedtime    MEDICATIONS  (PRN):  dextrose Oral Gel 15 Gram(s) Oral once PRN Blood Glucose LESS THAN 70 milliGRAM(s)/deciliter  dextrose Oral Gel 15 Gram(s) Oral once PRN Blood Glucose LESS THAN 70 milliGRAM(s)/deciliter  dextrose Oral Gel 15 Gram(s) Oral once PRN Blood Glucose LESS THAN 70 milliGRAM(s)/deciliter    ANTIBIOTICS:  cefTRIAXone   IVPB 1000 milliGRAM(s) IV Intermittent every 24 hours      VITALS:  T(F): 97.6, Max: 98.9 (11-07-23 @ 20:00)  HR: 71  BP: 91/56  RR: 18Vital Signs Last 24 Hrs  T(C): 36.4 (08 Nov 2023 05:11), Max: 37.2 (07 Nov 2023 20:00)  T(F): 97.6 (08 Nov 2023 05:11), Max: 98.9 (07 Nov 2023 20:00)  HR: 71 (08 Nov 2023 05:11) (71 - 109)  BP: 91/56 (08 Nov 2023 05:11) (91/56 - 98/56)  BP(mean): 68 (07 Nov 2023 20:00) (68 - 70)  RR: 18 (08 Nov 2023 05:11) (15 - 20)  SpO2: 98% (08 Nov 2023 05:11) (98% - 99%)    Parameters below as of 07 Nov 2023 20:00  Patient On (Oxygen Delivery Method): room air     I&O's Summary    07 Nov 2023 07:01  -  08 Nov 2023 07:00  --------------------------------------------------------  IN: 200 mL / OUT: 1 mL / NET: 199 mL      PHYSICAL EXAM:  Gen: NAD, resting in bed  HEENT: Normocephalic, atraumatic  Neck: supple, no lymphadenopathy  CV: Regular rate & regular rhythm  Lungs: CTABL no wheeze  Abdomen: Soft, NTND+ BS present  Ext: Warm, well perfused no CCE  Neuro: non focal, awake, CN II-XII intact   Skin: no rash, no erythema  Psych: no SI, HI, Hallucination     LABS:                        12.4   2.94  )-----------( 294      ( 08 Nov 2023 04:30 )             37.8     11-08    139  |  105  |  8<L>  ----------------------------<  132<H>  3.5   |  26  |  <0.5<L>    Ca    8.9      08 Nov 2023 04:30  Phos  4.1     11-08  Mg     2.0     11-08    TPro  6.4  /  Alb  3.8  /  TBili  0.3  /  DBili  x   /  AST  851<H>  /  ALT  436<H>  /  AlkPhos  162<H>  11-08      LIVER FUNCTIONS - ( 08 Nov 2023 04:30 )  Alb: 3.8 g/dL / Pro: 6.4 g/dL / ALK PHOS: 162 U/L / ALT: 436 U/L / AST: 851 U/L / GGT: x               MICROBIOLOGY:  Urinalysis Basic - ( 08 Nov 2023 04:30 )    Color: x / Appearance: x / SG: x / pH: x  Gluc: 132 mg/dL / Ketone: x  / Bili: x / Urobili: x   Blood: x / Protein: x / Nitrite: x   Leuk Esterase: x / RBC: x / WBC x   Sq Epi: x / Non Sq Epi: x / Bacteria: x        COVID-19 PCR: Detected (05 Nov 2023 20:41)    Lactate, Blood: 0.7 mmol/L (11-06-23 @ 01:10)  Blood Gas Venous - Lactate: 1.60 mmol/L (11-05-23 @ 19:59)  Lactate, Blood: 2.1 mmol/L (11-05-23 @ 19:19)        IMAGING:  CXR  Xray Chest 2 Views PA/Lat:   ACC: [id number] EXAM:  XR CHEST PA LAT 2V   ORDERED BY: ZOE GAYTAN     PROCEDURE DATE:  11/05/2023          INTERPRETATION:  Clinical History / Reason for exam: Cough    Comparison : Chest radiograph None.    Technique/Positioning: Chest PA and lateral.    Findings:    Support devices: None.    Cardiac/mediastinum/hilum: Unremarkable.    Lung parenchyma/Pleura: Within normal limits.    Skeleton/soft tissues: Unremarkable.    Impression:    No radiographic evidence of acute cardiopulmonary disease.        --- End of Report ---            REGIS HODGE   This document has been electronically signed. Nov 6 2023 11:58AM (11-05-23 @ 18:47)      CT    CARDIOLOGY TESTING  QRS axis to [] ° and NSR at a rate of [] BPM. There was no atrial enlargement. There was no ventricular hypertrophy. There were no ST-T changes and all intervals were normal.    12 Lead ECG:   Ventricular Rate 110 BPM    Atrial Rate 110 BPM    P-R Interval 120 ms    QRS Duration 82 ms    Q-T Interval 356 ms    QTC Calculation(Bazett) 481 ms    P Axis 68 degrees    R Axis 81 degrees    T Axis 37 degrees    Diagnosis Line Sinus tachycardia  Otherwise normal ECG    Confirmed by BENNY ROSS MD ([id number]) on 11/6/2023 6:50:48 AM (11-05-23 @ 20:30)      ASSESSMENT/PLAN:  Pt is a 24yo Female who presents with myalgia, cough, fever, found to have COVID, and hyperglycemia. Admitted to MICU for DKA. Gap closed and patient downgraded to medical floor for further management. Hospitalizatoin complicated by sudden elevation of LFT. Admitted for further management.     #Elevated LFTs, r/o Obstruction in setting of COVID  - sudden elevation of LFTs -> will repeat.   - Will get US Liver with Doppler if LFT remains elevated  - Hepatology consult if LFT remains elevated    #UTI  - pt reports dysuria  - UA: positive  - UCx: pending   - on Ceftriaxone    #Bilateral Ear Pain  - Patient with previous ear surgery on the right (tympanoplasty? tympanomastoidectomy?) with tympanostomy tube placement. Pt with right otitis externa, possible extruded/clogged tympanostomy tube on the left.   - rec floxin otic 5 drops q12h x 1 week to both ears (active infection in the right ear; drops will help to unclog tymp tube)  - dry ear precautions  - pt needs to f/u with her primary ENT for possible replacement of tubes and audiogram, mom prefers to follow with Dr Jones in [place]  - ENT on the case    #DKA in setting of COVID  - Anion gap closed  - Standing insulin: SubQ lantus given at 9am    #Myalgia, cough due to COVID-19 infection  - pt not on oxygen, not on steroid, or remservirc  - cont supportive management    #Constipation  - Xray KUB done    #uncontrolled type 1 DM   - A1c 10.4% , on toujeo 30 units daily in am and Afrezza 12 units TIDAC  - FS reviewed   - continue with IV fluid   - cotninue toujeo 22 units daily in am   - continue lispro 5 units TIDAC ,   - CHANGE Sliding scale to 1: 50 > 150 instead of 2: 50 > 150 TIDAC to avoid overcorrection and hypoglycemia   - will follow , discussed with mom switching from Afrezza to lispro SC but patient prefer to stay on Afrezza now # uncontrolled type 1 DM / COVID +   - A1c 10.4% , on toujeo 30 units daily in am and Afrezza 12 units TIDAC  - FS reviewed   - continue with IV fluid   - cotninue toujeo 22 units daily in am   - continue lispro 5 units TIDAC ,   - CHANGE Sliding scale to 1: 50 > 150 instead of 2: 50 > 150 TIDAC to avoid overcorrection and hypoglycemia   - ENDO: discussed with mom switching from Afrezza to lispro SC but patient prefer to stay on Afrezza now     #Supportive Management:  Dispo:   DVT Ppx: enoxaparin Injectable 40 milliGRAM(s) SubCutaneous every 24 hours  GI Ppx: Diet:  Diet, Regular:   Consistent Carbohydrate No Snacks (11-07-23 @ 00:02) [Active]    Total time spent to complete patient's bedside assessment, review medical chart, discuss medical plan of care with covering medical team was more than 45 minutes with >50% of time spent face to face with patient, discussion with patient/family and/or coordination of care    Resident's notes reviewed. My notes supersede resident's notes in case of discrepancy.    Kev Holder MD/[facility]  Attending Physician MEDICINE ATTENDING PROGRESS NOTE  Pt is a 24yo Female who presents with myalgia, cough, fever, found to have COVID, and hyperglycemia. Admitted to MICU for DKA. Gap closed and patient downgraded to medical floor for further management. Hospitalizatoin complicated by sudden elevation of LFT. Admitted for further management.     Interval/Overnight Events  - Will repeat LFTs -> if abnromal, will get US liver with doppler and hepatology consult.  - pt on Lovenox prophylactically but has been refusing since last 2 days -> will discuss the importance of being compliant with medications.  - if labs wnl, we could dc patient      ROS  General: Denies fevers, chills, nightsweats, weight loss  HEENT: Denies headache, rhinorrhea, sore throat, eye pain  CV: Denies CP, palpitations  PULM: Denies SOB, cough  GI: Denies abdominal pain, diarrhea  : Denies dysuria, hematuria  MSK: Denies arthralgias  SKIN: Denies rash   NEURO: Denies paresthesias, weakness  PSYCH: Denies depression    MEDICATIONS  (STANDING):  cefTRIAXone   IVPB 1000 milliGRAM(s) IV Intermittent every 24 hours  chlorhexidine 2% Cloths 1 Application(s) Topical <User Schedule>  dextrose 5%. 1000 milliLiter(s) (50 mL/Hr) IV Continuous <Continuous>  dextrose 5%. 1000 milliLiter(s) (100 mL/Hr) IV Continuous <Continuous>  dextrose 5%. 1000 milliLiter(s) (100 mL/Hr) IV Continuous <Continuous>  dextrose 5%. 1000 milliLiter(s) (50 mL/Hr) IV Continuous <Continuous>  dextrose 5%. 1000 milliLiter(s) (100 mL/Hr) IV Continuous <Continuous>  dextrose 50% Injectable 25 Gram(s) IV Push once  dextrose 50% Injectable 12.5 Gram(s) IV Push once  dextrose 50% Injectable 25 Gram(s) IV Push once  dextrose 50% Injectable 25 Gram(s) IV Push once  dextrose 50% Injectable 25 Gram(s) IV Push once  dextrose 50% Injectable 12.5 Gram(s) IV Push once  enoxaparin Injectable 40 milliGRAM(s) SubCutaneous every 24 hours  glucagon  Injectable 1 milliGRAM(s) IntraMuscular once  glucagon  Injectable 1 milliGRAM(s) IntraMuscular once  glucagon  Injectable 1 milliGRAM(s) IntraMuscular once  ibuprofen  Tablet. 400 milliGRAM(s) Oral once  influenza   Vaccine 0.5 milliLiter(s) IntraMuscular once  insulin glargine Injectable (LANTUS) 22 Unit(s) SubCutaneous every morning  insulin lispro (ADMELOG) corrective regimen sliding scale   SubCutaneous three times a day before meals  insulin lispro Injectable (ADMELOG) 5 Unit(s) SubCutaneous three times a day before meals  ofloxacin 0.3% Ophthalmic Solution for OTIC Use 5 Drop(s) Both Ears two times a day  polyethylene glycol 3350 17 Gram(s) Oral every 12 hours  senna 2 Tablet(s) Oral at bedtime    MEDICATIONS  (PRN):  dextrose Oral Gel 15 Gram(s) Oral once PRN Blood Glucose LESS THAN 70 milliGRAM(s)/deciliter  dextrose Oral Gel 15 Gram(s) Oral once PRN Blood Glucose LESS THAN 70 milliGRAM(s)/deciliter  dextrose Oral Gel 15 Gram(s) Oral once PRN Blood Glucose LESS THAN 70 milliGRAM(s)/deciliter    ANTIBIOTICS:  cefTRIAXone   IVPB 1000 milliGRAM(s) IV Intermittent every 24 hours      VITALS:  T(F): 97.6, Max: 98.9 (11-07-23 @ 20:00)  HR: 71  BP: 91/56  RR: 18Vital Signs Last 24 Hrs  T(C): 36.4 (08 Nov 2023 05:11), Max: 37.2 (07 Nov 2023 20:00)  T(F): 97.6 (08 Nov 2023 05:11), Max: 98.9 (07 Nov 2023 20:00)  HR: 71 (08 Nov 2023 05:11) (71 - 109)  BP: 91/56 (08 Nov 2023 05:11) (91/56 - 98/56)  BP(mean): 68 (07 Nov 2023 20:00) (68 - 70)  RR: 18 (08 Nov 2023 05:11) (15 - 20)  SpO2: 98% (08 Nov 2023 05:11) (98% - 99%)    Parameters below as of 07 Nov 2023 20:00  Patient On (Oxygen Delivery Method): room air     I&O's Summary    07 Nov 2023 07:01  -  08 Nov 2023 07:00  --------------------------------------------------------  IN: 200 mL / OUT: 1 mL / NET: 199 mL      PHYSICAL EXAM:  Gen: NAD, resting in bed  HEENT: Normocephalic, atraumatic  Neck: supple, no lymphadenopathy  CV: Regular rate & regular rhythm  Lungs: CTABL no wheeze  Abdomen: Soft, NTND+ BS present  Ext: Warm, well perfused no CCE  Neuro: non focal, awake, CN II-XII intact   Skin: no rash, no erythema  Psych: no SI, HI, Hallucination     LABS:                        12.4   2.94  )-----------( 294      ( 08 Nov 2023 04:30 )             37.8     11-08    139  |  105  |  8<L>  ----------------------------<  132<H>  3.5   |  26  |  <0.5<L>    Ca    8.9      08 Nov 2023 04:30  Phos  4.1     11-08  Mg     2.0     11-08    TPro  6.4  /  Alb  3.8  /  TBili  0.3  /  DBili  x   /  AST  851<H>  /  ALT  436<H>  /  AlkPhos  162<H>  11-08      LIVER FUNCTIONS - ( 08 Nov 2023 04:30 )  Alb: 3.8 g/dL / Pro: 6.4 g/dL / ALK PHOS: 162 U/L / ALT: 436 U/L / AST: 851 U/L / GGT: x               MICROBIOLOGY:  Urinalysis Basic - ( 08 Nov 2023 04:30 )    Color: x / Appearance: x / SG: x / pH: x  Gluc: 132 mg/dL / Ketone: x  / Bili: x / Urobili: x   Blood: x / Protein: x / Nitrite: x   Leuk Esterase: x / RBC: x / WBC x   Sq Epi: x / Non Sq Epi: x / Bacteria: x        COVID-19 PCR: Detected (05 Nov 2023 20:41)    Lactate, Blood: 0.7 mmol/L (11-06-23 @ 01:10)  Blood Gas Venous - Lactate: 1.60 mmol/L (11-05-23 @ 19:59)  Lactate, Blood: 2.1 mmol/L (11-05-23 @ 19:19)        IMAGING:  CXR  Xray Chest 2 Views PA/Lat:   ACC: [id number] EXAM:  XR CHEST PA LAT 2V   ORDERED BY: ZOE GAYTAN     PROCEDURE DATE:  11/05/2023          INTERPRETATION:  Clinical History / Reason for exam: Cough    Comparison : Chest radiograph None.    Technique/Positioning: Chest PA and lateral.    Findings:    Support devices: None.    Cardiac/mediastinum/hilum: Unremarkable.    Lung parenchyma/Pleura: Within normal limits.    Skeleton/soft tissues: Unremarkable.    Impression:    No radiographic evidence of acute cardiopulmonary disease.        --- End of Report ---            REGIS HODGE   This document has been electronically signed. Nov 6 2023 11:58AM (11-05-23 @ 18:47)      CT    CARDIOLOGY TESTING  QRS axis to [] ° and NSR at a rate of [] BPM. There was no atrial enlargement. There was no ventricular hypertrophy. There were no ST-T changes and all intervals were normal.    12 Lead ECG:   Ventricular Rate 110 BPM    Atrial Rate 110 BPM    P-R Interval 120 ms    QRS Duration 82 ms    Q-T Interval 356 ms    QTC Calculation(Bazett) 481 ms    P Axis 68 degrees    R Axis 81 degrees    T Axis 37 degrees    Diagnosis Line Sinus tachycardia  Otherwise normal ECG    Confirmed by BENNY ROSS MD ([id number]) on 11/6/2023 6:50:48 AM (11-05-23 @ 20:30)      ASSESSMENT/PLAN:  Pt is a 24yo Female who presents with myalgia, cough, fever, found to have COVID, and hyperglycemia. Admitted to MICU for DKA. Gap closed and patient downgraded to medical floor for further management. Hospitalizatoin complicated by sudden elevation of LFT. Admitted for further management.     #Elevated LFTs, r/o Obstruction in setting of COVID  - sudden elevation of LFTs -> will repeat.   - Will get US Liver with Doppler if LFT remains elevated  - Hepatology consult if LFT remains elevated    #UTI  - pt reports dysuria  - UA: positive  - UCx: pending   - on Ceftriaxone    #Bilateral Ear Pain  - Patient with previous ear surgery on the right (tympanoplasty? tympanomastoidectomy?) with tympanostomy tube placement. Pt with right otitis externa, possible extruded/clogged tympanostomy tube on the left.   - rec floxin otic 5 drops q12h x 1 week to both ears (active infection in the right ear; drops will help to unclog tymp tube)  - dry ear precautions  - pt needs to f/u with her primary ENT for possible replacement of tubes and audiogram, mom prefers to follow with Dr Jones in [place]  - ENT on the case    #DKA in setting of COVID  - Anion gap closed  - Standing insulin: SubQ lantus given at 9am    #Myalgia, cough due to COVID-19 infection  - pt not on oxygen, not on steroid, or remservirc  - cont supportive management    #Constipation  - Xray KUB done    #uncontrolled type 1 DM   - A1c 10.4% , on toujeo 30 units daily in am and Afrezza 12 units TIDAC  - FS reviewed   - continue with IV fluid   - cotninue toujeo 22 units daily in am   - continue lispro 5 units TIDAC ,   - CHANGE Sliding scale to 1: 50 > 150 instead of 2: 50 > 150 TIDAC to avoid overcorrection and hypoglycemia   - will follow , discussed with mom switching from Afrezza to lispro SC but patient prefer to stay on Afrezza now # uncontrolled type 1 DM / COVID +   - A1c 10.4% , on toujeo 30 units daily in am and Afrezza 12 units TIDAC  - FS reviewed   - continue with IV fluid   - cotninue toujeo 22 units daily in am   - continue lispro 5 units TIDAC ,   - CHANGE Sliding scale to 1: 50 > 150 instead of 2: 50 > 150 TIDAC to avoid overcorrection and hypoglycemia   - ENDO: discussed with mom switching from Afrezza to lispro SC but patient prefer to stay on Afrezza now     #Supportive Management:  Dispo:   DVT Ppx: enoxaparin Injectable 40 milliGRAM(s) SubCutaneous every 24 hours  GI Ppx: Diet:  Diet, Regular:   Consistent Carbohydrate No Snacks (11-07-23 @ 00:02) [Active]    Total time spent to complete patient's bedside assessment, review medical chart, discuss medical plan of care with covering medical team was more than 45 minutes with >50% of time spent face to face with patient, discussion with patient/family and/or coordination of care    Resident's notes reviewed. My notes supersede resident's notes in case of discrepancy.    Kev Holder MD/[place]  Attending Physician

## 2023-11-08 NOTE — DIETITIAN INITIAL EVALUATION ADULT - OTHER CALCULATIONS
Using ABW: ENERGY: 4013-9401 kcal/day (30-35 kcal/kg); PROTEIN: 56-70 g/day (1.2-1.5 g/kg); FLUID: 8145-6987 mL/day (30-35 mL/kg) -- with consideration for age, BMI

## 2023-11-08 NOTE — DIETITIAN INITIAL EVALUATION ADULT - OTHER INFO
Pertinent Medical Information: Per H&P, pt is a 22 y/o female with PMHx of DM type 1 (Uncontrolled, follows Dr. Cornelius) and Hashimoto thyroiditis (Controlled) presents to ed with cough, myalgias, and hyperglycemia x 2 days.     Weight hx: UBW unknown. Current dosing weight is 46.6 KG. Previous admission weight was  Pertinent Medical Information: Per H&P, pt is a 24 y/o female with PMHx of DM type 1 (Uncontrolled, follows Dr. Cornelius) and Hashimoto thyroiditis (Controlled) presents to ed with cough, myalgias, and hyperglycemia x 2 days.     Weight hx: UBW unknown. Current dosing weight is 46.6 KG. Previous admission weight was 53 KG on 8/30/22; 12% unintentional weight loss in over 1 year compared to current dosing weight. Pt does not meet weight criteria for malnutrition at this time.

## 2023-11-08 NOTE — DIETITIAN INITIAL EVALUATION ADULT - PERTINENT MEDS FT
MEDICATIONS  (STANDING):  cefTRIAXone   IVPB 1000 milliGRAM(s) IV Intermittent every 24 hours  chlorhexidine 2% Cloths 1 Application(s) Topical <User Schedule>  dextrose 5%. 1000 milliLiter(s) (100 mL/Hr) IV Continuous <Continuous>  dextrose 5%. 1000 milliLiter(s) (100 mL/Hr) IV Continuous <Continuous>  dextrose 5%. 1000 milliLiter(s) (50 mL/Hr) IV Continuous <Continuous>  dextrose 5%. 1000 milliLiter(s) (50 mL/Hr) IV Continuous <Continuous>  dextrose 5%. 1000 milliLiter(s) (100 mL/Hr) IV Continuous <Continuous>  dextrose 50% Injectable 25 Gram(s) IV Push once  dextrose 50% Injectable 25 Gram(s) IV Push once  dextrose 50% Injectable 12.5 Gram(s) IV Push once  dextrose 50% Injectable 25 Gram(s) IV Push once  dextrose 50% Injectable 12.5 Gram(s) IV Push once  dextrose 50% Injectable 25 Gram(s) IV Push once  enoxaparin Injectable 40 milliGRAM(s) SubCutaneous every 24 hours  glucagon  Injectable 1 milliGRAM(s) IntraMuscular once  glucagon  Injectable 1 milliGRAM(s) IntraMuscular once  glucagon  Injectable 1 milliGRAM(s) IntraMuscular once  ibuprofen  Tablet. 400 milliGRAM(s) Oral once  influenza   Vaccine 0.5 milliLiter(s) IntraMuscular once  insulin glargine Injectable (LANTUS) 22 Unit(s) SubCutaneous every morning  insulin lispro (ADMELOG) corrective regimen sliding scale   SubCutaneous three times a day before meals  insulin lispro Injectable (ADMELOG) 5 Unit(s) SubCutaneous three times a day before meals  ofloxacin 0.3% Ophthalmic Solution for OTIC Use 5 Drop(s) Both Ears two times a day  polyethylene glycol 3350 17 Gram(s) Oral every 12 hours  senna 2 Tablet(s) Oral at bedtime    MEDICATIONS  (PRN):  dextrose Oral Gel 15 Gram(s) Oral once PRN Blood Glucose LESS THAN 70 milliGRAM(s)/deciliter  dextrose Oral Gel 15 Gram(s) Oral once PRN Blood Glucose LESS THAN 70 milliGRAM(s)/deciliter  dextrose Oral Gel 15 Gram(s) Oral once PRN Blood Glucose LESS THAN 70 milliGRAM(s)/deciliter

## 2023-11-08 NOTE — CONSULT NOTE ADULT - ATTENDING COMMENTS
23 y o  F with T1DM admitted with DKA seen for elevated liver tests  COVID positive. Has myalgias. Sore throat resolved  Treated with ceftriaxone and NSAIDs  No icterus  Abdomen soft non tender  Ext no edema  Tattoos+  Acute hepatitis - possibly due to DILI. Covid can cause as well but was normal at admisison  AST > ALT raises possibility of extrahepatic but has ALP elevation  Acute hepatitis Work up With myalgia check CK level

## 2023-11-08 NOTE — CONSULT NOTE ADULT - SUBJECTIVE AND OBJECTIVE BOX
-------------------------------------------------------------------------------------------------------------------------------------------------------------------------------  HEPATOLOGY INITIAL CONSULT  -------------------------------------------------------------------------------------------------------------------------------------------------------------------------------    HPI:  23 year old female with pmhx of DM type 1 (Uncontrolled, follows Dr. Cornelius) and Hashimoto thyroiditis (Controlled) presents to ed with cough, myalgias, and hyperglycemia x 2 days.  Admitted to MICU for DKA. Gap closed and patient downgraded to medical floor for further management. Hospitalization complicated by sudden elevation of LFT. Admitted for further management.   Hepatology consulted for ALI     PAST MEDICAL & SURGICAL HISTORY:  DM (diabetes mellitus) type 1 with ketoacidosis      Hashimoto's thyroiditis      History of tonsillectomy          Review of Systems: Negative except as per HPI.    MEDICATIONS  (STANDING):  cefTRIAXone   IVPB 1000 milliGRAM(s) IV Intermittent every 24 hours  chlorhexidine 2% Cloths 1 Application(s) Topical <User Schedule>  dextrose 5%. 1000 milliLiter(s) (100 mL/Hr) IV Continuous <Continuous>  dextrose 5%. 1000 milliLiter(s) (100 mL/Hr) IV Continuous <Continuous>  dextrose 5%. 1000 milliLiter(s) (100 mL/Hr) IV Continuous <Continuous>  dextrose 5%. 1000 milliLiter(s) (50 mL/Hr) IV Continuous <Continuous>  dextrose 5%. 1000 milliLiter(s) (50 mL/Hr) IV Continuous <Continuous>  dextrose 50% Injectable 12.5 Gram(s) IV Push once  dextrose 50% Injectable 25 Gram(s) IV Push once  dextrose 50% Injectable 12.5 Gram(s) IV Push once  dextrose 50% Injectable 25 Gram(s) IV Push once  dextrose 50% Injectable 25 Gram(s) IV Push once  dextrose 50% Injectable 25 Gram(s) IV Push once  enoxaparin Injectable 40 milliGRAM(s) SubCutaneous every 24 hours  glucagon  Injectable 1 milliGRAM(s) IntraMuscular once  glucagon  Injectable 1 milliGRAM(s) IntraMuscular once  glucagon  Injectable 1 milliGRAM(s) IntraMuscular once  ibuprofen  Tablet. 400 milliGRAM(s) Oral once  influenza   Vaccine 0.5 milliLiter(s) IntraMuscular once  insulin glargine Injectable (LANTUS) 22 Unit(s) SubCutaneous every morning  insulin lispro (ADMELOG) corrective regimen sliding scale   SubCutaneous three times a day before meals  insulin lispro Injectable (ADMELOG) 5 Unit(s) SubCutaneous three times a day before meals  ofloxacin 0.3% Ophthalmic Solution for OTIC Use 5 Drop(s) Both Ears two times a day  polyethylene glycol 3350 17 Gram(s) Oral every 12 hours  senna 2 Tablet(s) Oral at bedtime    MEDICATIONS  (PRN):  dextrose Oral Gel 15 Gram(s) Oral once PRN Blood Glucose LESS THAN 70 milliGRAM(s)/deciliter  dextrose Oral Gel 15 Gram(s) Oral once PRN Blood Glucose LESS THAN 70 milliGRAM(s)/deciliter  dextrose Oral Gel 15 Gram(s) Oral once PRN Blood Glucose LESS THAN 70 milliGRAM(s)/deciliter      ALLERGIES:  NKDA    SOCIAL HISTORY:  - Alcohol: none   - Recreational drug use: none     FAMILY HISTORY:  no fhx of gi cancers     Vital Signs Last 24 Hrs  T(C): 36.5 (08 Nov 2023 13:11), Max: 37.2 (07 Nov 2023 20:00)  T(F): 97.7 (08 Nov 2023 13:11), Max: 98.9 (07 Nov 2023 20:00)  HR: 105 (08 Nov 2023 13:11) (71 - 106)  BP: 104/67 (08 Nov 2023 13:11) (91/56 - 104/67)  BP(mean): 68 (07 Nov 2023 20:00) (68 - 70)  RR: 18 (08 Nov 2023 13:11) (15 - 20)  SpO2: 98% (08 Nov 2023 05:11) (98% - 98%)    Parameters below as of 07 Nov 2023 20:00  Patient On (Oxygen Delivery Method): room air        PHYSICAL EXAM:  Constitutional: no acute distress  Eyes: no icterus  Neck: no masses, no LAD  Respiratory: normal inspiratory effort; no wheezing or crackles  Cardiovascular: RRR, normal S1/S2, no murmurs/rubs/gallops  Gastrointestinal: soft, nondistended, nontender, +BS  Extremities: no LE edema  Neurological: AAOx3, no asterixis  Skin: no rashes, bruises, petechiae    LABS:                        12.4   2.94  )-----------( 294      ( 08 Nov 2023 04:30 )             37.8     11-08    139  |  105  |  8<L>  ----------------------------<  132<H>  3.5   |  26  |  <0.5<L>    Ca    8.9      08 Nov 2023 04:30  Phos  4.1     11-08  Mg     2.0     11-08    TPro  6.7  /  Alb  4.0  /  TBili  0.3  /  DBili  <0.2  /  AST  725<H>  /  ALT  442<H>  /  AlkPhos  171<H>  11-08      LIVER FUNCTIONS - ( 08 Nov 2023 11:25 )  Alb: 4.0 g/dL / Pro: 6.7 g/dL / ALK PHOS: 171 U/L / ALT: 442 U/L / AST: 725 U/L / GGT: x             RADIOLOGY & ADDITIONAL STUDIES:    < from: CT Abdomen and Pelvis w/ Oral Cont and w/ IV Cont (03.25.19 @ 18:41) >  EXAM:  CT ABDOMEN AND PELVIS OC IC            PROCEDURE DATE:  03/25/2019            INTERPRETATION:  CLINICAL STATEMENT: Abdominal pain.      TECHNIQUE: Contiguous axial CT images were obtained from the lower chest   to the pubic symphysis following administration of 100cc Optiray 320   intravenous contrast.  Oral contrast was administered.  Reformatted   images in the coronal and sagittal planes were acquired.    COMPARISON CT: None.    OTHER STUDIES USED FOR CORRELATION: None.       FINDINGS:    LOWER CHEST: Unremarkable.    HEPATOBILIARY: Unremarkable.    SPLEEN: Unremarkable.    PANCREAS: Unremarkable.    ADRENAL GLANDS: Unremarkable.    KIDNEYS: Unremarkable.    ABDOMINOPELVIC NODES: Unremarkable.    PELVIC ORGANS: Unremarkable.    PERITONEUM/MESENTERY/BOWEL: Mild pelvic ascites; likely physiologic. Best   candidate for appendix unremarkable (series 5, image 368). No bowel   obstruction    BONES/SOFT TISSUES: Unremarkable.      IMPRESSION:     No evidence of acute intra-abdominal pathology.    < end of copied text >  
Pt is a 24yo Female who presents with DKA/COVID - called to assess for bilateral ear pain. Patient states she has h/o myringotomy tubes multiple times, most recently one year ago with Dr Jones, as well as previous ear surgery on the right approx 2 years ago with Dr Blanco (had TM rupture, chronic infections). Pt states she has had bilateral ear pain and hearing loss on and off for the past month or so, has not followed up with her ENT recently. Pt states she now has noticed some drainage from her left ear and noted blood on the pillow.     PAST MEDICAL & SURGICAL HISTORY:  DM (diabetes mellitus) type 1 with ketoacidosis  Hashimoto's thyroiditis  History of tonsillectomy      MEDICATIONS  (STANDING):  cefTRIAXone   IVPB 1000 milliGRAM(s) IV Intermittent every 24 hours  chlorhexidine 2% Cloths 1 Application(s) Topical <User Schedule>  dextrose 5%. 1000 milliLiter(s) (100 mL/Hr) IV Continuous <Continuous>  dextrose 5%. 1000 milliLiter(s) (100 mL/Hr) IV Continuous <Continuous>  dextrose 5%. 1000 milliLiter(s) (50 mL/Hr) IV Continuous <Continuous>  dextrose 5%. 1000 milliLiter(s) (100 mL/Hr) IV Continuous <Continuous>  dextrose 5%. 1000 milliLiter(s) (50 mL/Hr) IV Continuous <Continuous>  dextrose 50% Injectable 25 Gram(s) IV Push once  dextrose 50% Injectable 12.5 Gram(s) IV Push once  dextrose 50% Injectable 25 Gram(s) IV Push once  dextrose 50% Injectable 25 Gram(s) IV Push once  dextrose 50% Injectable 12.5 Gram(s) IV Push once  dextrose 50% Injectable 25 Gram(s) IV Push once  enoxaparin Injectable 40 milliGRAM(s) SubCutaneous every 24 hours  glucagon  Injectable 1 milliGRAM(s) IntraMuscular once  glucagon  Injectable 1 milliGRAM(s) IntraMuscular once  glucagon  Injectable 1 milliGRAM(s) IntraMuscular once  ibuprofen  Tablet. 400 milliGRAM(s) Oral once  influenza   Vaccine 0.5 milliLiter(s) IntraMuscular once  insulin glargine Injectable (LANTUS) 22 Unit(s) SubCutaneous every morning  insulin lispro (ADMELOG) corrective regimen sliding scale   SubCutaneous three times a day before meals  insulin lispro Injectable (ADMELOG) 5 Unit(s) SubCutaneous three times a day before meals  polyethylene glycol 3350 17 Gram(s) Oral every 12 hours  senna 2 Tablet(s) Oral at bedtime    MEDICATIONS  (PRN):  dextrose Oral Gel 15 Gram(s) Oral once PRN Blood Glucose LESS THAN 70 milliGRAM(s)/deciliter  dextrose Oral Gel 15 Gram(s) Oral once PRN Blood Glucose LESS THAN 70 milliGRAM(s)/deciliter    Allergies    No Known Allergies    Intolerances      REVIEW OF SYSTEMS   [x] A ten-point review of systems was otherwise negative except as noted.    Vital Signs Last 24 Hrs  T(C): 37.3 (07 Nov 2023 10:00), Max: 37.3 (07 Nov 2023 10:00)  T(F): 99.2 (07 Nov 2023 10:00), Max: 99.2 (07 Nov 2023 10:00)  HR: 102 (07 Nov 2023 18:00) (87 - 109)  BP: 98/56 (07 Nov 2023 16:00) (98/56 - 110/71)  BP(mean): 70 (07 Nov 2023 16:00) (68 - 85)  RR: 20 (07 Nov 2023 16:00) (20 - 25)  SpO2: 98% (07 Nov 2023 18:00) (97% - 100%)    Parameters below as of 07 Nov 2023 18:00  Patient On (Oxygen Delivery Method): room air      GEN: NAD, awake and alert. No drooling or pooling of secretions. No stridor or stertor. Good vocal quality, no hoarseness.   SKIN: Good color, non diaphoretic.  HEENT: Oral mucosa pink and moist. No erythema or edema noted to buccal mucosa, tongue, FOM, uvula or posterior oropharynx. Uvula midline.   EARS: no pre/post auricular TTP, no mastoid TTP bilaterally. EAC with stenosis on the right, + patch noted and fluid noted within the EAC, cannot assess TM. left ear WNL, + tympanostomy tube possibly partially extruded +/- retracted TM, no signs of infection. tympanostomy tube with cerumen around it, possible clogged with cerumen.   NECK: Trachea midline, Neck supple, no TTP to B/L lateral neck, no cervical LAD.  RESP: No dyspnea, non-labored breathing. No use of accessory muscles.   CARDIO: +S1/S2  ABDO: Soft, NT.  EXT: LOUISE x 4      LABS:                        12.2   7.00  )-----------( 296      ( 07 Nov 2023 05:16 )             37.1     11-07    141  |  107  |  7<L>  ----------------------------<  61<L>  3.5   |  21  |  0.5<L>    Ca    8.9      07 Nov 2023 05:16  Mg     1.8     11-07    TPro  6.3  /  Alb  4.2  /  TBili  <0.2  /  DBili  x   /  AST  11  /  ALT  7   /  AlkPhos  73  11-06    PT/INR - ( 06 Nov 2023 04:52 )   PT: 11.90 sec;   INR: 1.04 ratio         PTT - ( 06 Nov 2023 04:52 )  PTT:28.4 sec      RADIOLOGY & ADDITIONAL STUDIES:    No recent pertinent imaging. 
HPI:  23 year old female with pmhx of DM type 1 (Uncontrolled, follows Dr. Cornelius) and Hashimoto thyroiditis (Controlled) presents to ed with cough, myalgias, and hyperglycemia x 2 days. She took a covid test at home and it was positive. Pt states her glucose has been elevated in the 400s, has been giving herself more insulin which is still NOT controlling her blood sugar. Pt denies nausea, vomiting, diarrhea, abd pain, chest pain, calf pain or swelling, or recent travel/sick contacts.    Endorses constipation, No BM since 1-2 weeks.    In the ED, BHB +ve, admitted to ICU for DKA.    Vital Signs Last 24 Hrs:  T(F): 98.2 (05 Nov 2023 18:12), Max: 98.2 (05 Nov 2023 18:12)  HR: 112 (05 Nov 2023 18:12) (112 - 112)  BP: 132/62 (05 Nov 2023 18:12) (132/62 - 132/62)  RR: 18 (05 Nov 2023 18:12) (18 - 18)  SpO2: 98% (05 Nov 2023 18:12) (98% - 98%) on RA   (05 Nov 2023 21:37)      PAST MEDICAL & SURGICAL HISTORY  DM (diabetes mellitus) type 1 with ketoacidosis    Hashimoto's thyroiditis    History of tonsillectomy        FAMILY HISTORY:  FAMILY HISTORY:      SOCIAL HISTORY:  []smoker  []Alcohol  []Drug    ALLERGIES:  No Known Allergies      MEDICATIONS:  MEDICATIONS  (STANDING):  chlorhexidine 2% Cloths 1 Application(s) Topical <User Schedule>  dextrose 5% + sodium chloride 0.45% with potassium chloride 20 mEq/L 1000 milliLiter(s) (100 mL/Hr) IV Continuous <Continuous>  dextrose 5%. 1000 milliLiter(s) (50 mL/Hr) IV Continuous <Continuous>  dextrose 5%. 1000 milliLiter(s) (100 mL/Hr) IV Continuous <Continuous>  dextrose 5%. 1000 milliLiter(s) (50 mL/Hr) IV Continuous <Continuous>  dextrose 5%. 1000 milliLiter(s) (100 mL/Hr) IV Continuous <Continuous>  dextrose 50% Injectable 25 Gram(s) IV Push once  dextrose 50% Injectable 12.5 Gram(s) IV Push once  dextrose 50% Injectable 25 Gram(s) IV Push once  dextrose 50% Injectable 25 Gram(s) IV Push once  dextrose 50% Injectable 12.5 Gram(s) IV Push once  dextrose 50% Injectable 25 Gram(s) IV Push once  enoxaparin Injectable 40 milliGRAM(s) SubCutaneous every 24 hours  glucagon  Injectable 1 milliGRAM(s) IntraMuscular once  glucagon  Injectable 1 milliGRAM(s) IntraMuscular once  influenza   Vaccine 0.5 milliLiter(s) IntraMuscular once  insulin lispro (ADMELOG) corrective regimen sliding scale   SubCutaneous three times a day before meals  insulin lispro Injectable (ADMELOG) 5 Unit(s) SubCutaneous three times a day before meals  insulin regular Infusion 1 Unit(s)/Hr (1 mL/Hr) IV Continuous <Continuous>  polyethylene glycol 3350 17 Gram(s) Oral every 12 hours  senna 2 Tablet(s) Oral at bedtime    MEDICATIONS  (PRN):  dextrose Oral Gel 15 Gram(s) Oral once PRN Blood Glucose LESS THAN 70 milliGRAM(s)/deciliter  dextrose Oral Gel 15 Gram(s) Oral once PRN Blood Glucose LESS THAN 70 milliGRAM(s)/deciliter      HOME MEDICATIONS:  Home Medications:  Afrezza 12 units inhalation powder: 12 unit(s) inhaled 3 times a day (05 Nov 2023 22:24)  Toujeo SoloStar 300 units/mL subcutaneous solution: 30 international unit(s) subcutaneous once a day (05 Nov 2023 22:24)      VITALS:   T(F): 97.8 (11-06 @ 12:00), Max: 98.2 (11-05 @ 18:12)  HR: 86 (11-06 @ 16:00) (77 - 112)  BP: 106/55 (11-06 @ 12:00) (89/54 - 132/62)  BP(mean): 72 (11-06 @ 12:00) (65 - 77)  RR: 35 (11-06 @ 10:00) (18 - 45)  SpO2: 98% (11-06 @ 16:00) (96% - 100%)    I&O's Summary    05 Nov 2023 07:01  -  06 Nov 2023 07:00  --------------------------------------------------------  IN: 1544 mL / OUT: 0 mL / NET: 1544 mL    06 Nov 2023 07:01  -  06 Nov 2023 17:06  --------------------------------------------------------  IN: 1105 mL / OUT: 2 mL / NET: 1103 mL        REVIEW OF SYSTEMS:  limited as patient sleepy not answering    PHYSICAL EXAM:  GENERAL: Patient sleeping , respond to questions bit feels tired and sleepy   EYES: No proptosis, no lid lag  LUNGS: Clear to auscultation bilaterally not on O2   CARDIOVASCULAR: S1/S2 present, RRR  ABD: Soft, non-tender, non-distended, +BS  EXT: No MERLIN      LABS:                        12.0   5.57  )-----------( 319      ( 06 Nov 2023 04:52 )             37.1     11-06    132<L>  |  99  |  11  ----------------------------<  350<H>  4.2   |  20  |  0.5<L>    Ca    8.5      06 Nov 2023 11:39  Mg     1.6     11-06    TPro  6.3  /  Alb  4.2  /  TBili  <0.2  /  DBili  x   /  AST  11  /  ALT  7   /  AlkPhos  73  11-06    PT/INR - ( 06 Nov 2023 04:52 )   PT: 11.90 sec;   INR: 1.04 ratio         PTT - ( 06 Nov 2023 04:52 )  PTT:28.4 sec  Lactate, Blood: 0.7 mmol/L (11-06-23 @ 01:10)  Troponin T, Serum: <0.01 ng/mL (11-05-23 @ 20:27)  Lactate, Blood: 2.1 mmol/L *H* (11-05-23 @ 19:19)    CARDIAC MARKERS ( 05 Nov 2023 20:27 )  x     / <0.01 ng/mL / x     / x     / x          11-06 Chol 168 LDL -- HDL 44<L> Trig 122  POCT Blood Glucose.: 136 mg/dL (11-06-23 @ 16:23)  POCT Blood Glucose.: 311 mg/dL (11-06-23 @ 11:59)  POCT Blood Glucose.: 302 mg/dL (11-06-23 @ 09:28)  POCT Blood Glucose.: 182 mg/dL (11-06-23 @ 08:29)  POCT Blood Glucose.: 193 mg/dL (11-06-23 @ 07:29)  POCT Blood Glucose.: 171 mg/dL (11-06-23 @ 06:22)  POCT Blood Glucose.: 133 mg/dL (11-06-23 @ 05:13)  POCT Blood Glucose.: 142 mg/dL (11-06-23 @ 04:11)  POCT Blood Glucose.: 172 mg/dL (11-06-23 @ 03:00)  POCT Blood Glucose.: 185 mg/dL (11-06-23 @ 02:04)  POCT Blood Glucose.: 178 mg/dL (11-06-23 @ 01:09)  POCT Blood Glucose.: 198 mg/dL (11-06-23 @ 00:05)  POCT Blood Glucose.: 258 mg/dL (11-05-23 @ 23:13)  POCT Blood Glucose.: 360 mg/dL (11-05-23 @ 21:34)    TSH 0.77; Total T3 --; Free T4 --  A1C with Estimated Average Glucose Result: 10.4: Method: Immunoassay

## 2023-11-08 NOTE — DIETITIAN INITIAL EVALUATION ADULT - ADD RECOMMEND
1. ADD Glucerna Shake 3X/DAILY -- provides 660 kcal, 30g pro total; ADD Prosource Gelatein 20 SF 2X/DAILY -- provides 180 kcal, 40g pro total  2. Continue with current diet order   3. Encourage PO intake    Pt is at high nutrition risk; will f/u 3-5 days or prn.

## 2023-11-08 NOTE — DIETITIAN INITIAL EVALUATION ADULT - NAME AND PHONE
Fanta [id number] or TEAMS    Nutrition Interventions: meals, snacks, medical nutrition supplements; Nutrition Monitoring: Diet order, PO intake, weights, labs, NFPF, body composition, BM and tolerance to medical food supplements

## 2023-11-08 NOTE — PROGRESS NOTE ADULT - SUBJECTIVE AND OBJECTIVE BOX
S: FS reviewed , hypoglycemia noted     11-08    139  |  105  |  8<L>  ----------------------------<  132<H>  3.5   |  26  |  <0.5<L>    Ca    8.9      08 Nov 2023 04:30  Phos  4.1     11-08  Mg     2.0     11-08    TPro  6.7  /  Alb  4.0  /  TBili  0.3  /  DBili  <0.2  /  AST  725<H>  /  ALT  442<H>  /  AlkPhos  171<H>  11-08      CAPILLARY BLOOD GLUCOSE  212 (08 Nov 2023 13:15)  344 (08 Nov 2023 01:45)  270 (07 Nov 2023 23:32)  318 (07 Nov 2023 22:23)      POCT Blood Glucose.: 115 mg/dL (08 Nov 2023 10:18)  POCT Blood Glucose.: 49 mg/dL (08 Nov 2023 09:56)  POCT Blood Glucose.: 153 mg/dL (08 Nov 2023 07:50)  POCT Blood Glucose.: 154 mg/dL (07 Nov 2023 17:06)      MEDICATIONS  (STANDING):  cefTRIAXone   IVPB 1000 milliGRAM(s) IV Intermittent every 24 hours  chlorhexidine 2% Cloths 1 Application(s) Topical <User Schedule>  dextrose 5%. 1000 milliLiter(s) (100 mL/Hr) IV Continuous <Continuous>  dextrose 5%. 1000 milliLiter(s) (50 mL/Hr) IV Continuous <Continuous>  dextrose 5%. 1000 milliLiter(s) (50 mL/Hr) IV Continuous <Continuous>  dextrose 5%. 1000 milliLiter(s) (100 mL/Hr) IV Continuous <Continuous>  dextrose 5%. 1000 milliLiter(s) (100 mL/Hr) IV Continuous <Continuous>  dextrose 50% Injectable 25 Gram(s) IV Push once  dextrose 50% Injectable 12.5 Gram(s) IV Push once  dextrose 50% Injectable 25 Gram(s) IV Push once  dextrose 50% Injectable 25 Gram(s) IV Push once  dextrose 50% Injectable 12.5 Gram(s) IV Push once  dextrose 50% Injectable 25 Gram(s) IV Push once  enoxaparin Injectable 40 milliGRAM(s) SubCutaneous every 24 hours  glucagon  Injectable 1 milliGRAM(s) IntraMuscular once  glucagon  Injectable 1 milliGRAM(s) IntraMuscular once  glucagon  Injectable 1 milliGRAM(s) IntraMuscular once  ibuprofen  Tablet. 400 milliGRAM(s) Oral once  influenza   Vaccine 0.5 milliLiter(s) IntraMuscular once  insulin glargine Injectable (LANTUS) 20 Unit(s) SubCutaneous every morning  insulin lispro Injectable (ADMELOG) 3 Unit(s) SubCutaneous three times a day before meals  ofloxacin 0.3% Ophthalmic Solution for OTIC Use 5 Drop(s) Both Ears two times a day  polyethylene glycol 3350 17 Gram(s) Oral every 12 hours  senna 2 Tablet(s) Oral at bedtime    MEDICATIONS  (PRN):  dextrose Oral Gel 15 Gram(s) Oral once PRN Blood Glucose LESS THAN 70 milliGRAM(s)/deciliter  dextrose Oral Gel 15 Gram(s) Oral once PRN Blood Glucose LESS THAN 70 milliGRAM(s)/deciliter  dextrose Oral Gel 15 Gram(s) Oral once PRN Blood Glucose LESS THAN 70 milliGRAM(s)/deciliter

## 2023-11-09 ENCOUNTER — TRANSCRIPTION ENCOUNTER (OUTPATIENT)
Age: 23
End: 2023-11-09

## 2023-11-09 VITALS — TEMPERATURE: 98 F

## 2023-11-09 LAB
ALBUMIN SERPL ELPH-MCNC: 3.7 G/DL — SIGNIFICANT CHANGE UP (ref 3.5–5.2)
ALBUMIN SERPL ELPH-MCNC: 3.7 G/DL — SIGNIFICANT CHANGE UP (ref 3.5–5.2)
ALP SERPL-CCNC: 162 U/L — HIGH (ref 30–115)
ALP SERPL-CCNC: 162 U/L — HIGH (ref 30–115)
ALT FLD-CCNC: 343 U/L — HIGH (ref 0–41)
ALT FLD-CCNC: 343 U/L — HIGH (ref 0–41)
ANION GAP SERPL CALC-SCNC: 10 MMOL/L — SIGNIFICANT CHANGE UP (ref 7–14)
ANION GAP SERPL CALC-SCNC: 10 MMOL/L — SIGNIFICANT CHANGE UP (ref 7–14)
AST SERPL-CCNC: 240 U/L — HIGH (ref 0–41)
AST SERPL-CCNC: 240 U/L — HIGH (ref 0–41)
BASOPHILS # BLD AUTO: 0.05 K/UL — SIGNIFICANT CHANGE UP (ref 0–0.2)
BASOPHILS # BLD AUTO: 0.05 K/UL — SIGNIFICANT CHANGE UP (ref 0–0.2)
BASOPHILS NFR BLD AUTO: 1.5 % — HIGH (ref 0–1)
BASOPHILS NFR BLD AUTO: 1.5 % — HIGH (ref 0–1)
BILIRUB SERPL-MCNC: 0.2 MG/DL — SIGNIFICANT CHANGE UP (ref 0.2–1.2)
BILIRUB SERPL-MCNC: 0.2 MG/DL — SIGNIFICANT CHANGE UP (ref 0.2–1.2)
BUN SERPL-MCNC: 7 MG/DL — LOW (ref 10–20)
BUN SERPL-MCNC: 7 MG/DL — LOW (ref 10–20)
CALCIUM SERPL-MCNC: 9.2 MG/DL — SIGNIFICANT CHANGE UP (ref 8.4–10.5)
CALCIUM SERPL-MCNC: 9.2 MG/DL — SIGNIFICANT CHANGE UP (ref 8.4–10.5)
CHLORIDE SERPL-SCNC: 101 MMOL/L — SIGNIFICANT CHANGE UP (ref 98–110)
CHLORIDE SERPL-SCNC: 101 MMOL/L — SIGNIFICANT CHANGE UP (ref 98–110)
CO2 SERPL-SCNC: 23 MMOL/L — SIGNIFICANT CHANGE UP (ref 17–32)
CO2 SERPL-SCNC: 23 MMOL/L — SIGNIFICANT CHANGE UP (ref 17–32)
CREAT SERPL-MCNC: 0.5 MG/DL — LOW (ref 0.7–1.5)
CREAT SERPL-MCNC: 0.5 MG/DL — LOW (ref 0.7–1.5)
EGFR: 137 ML/MIN/1.73M2 — SIGNIFICANT CHANGE UP
EGFR: 137 ML/MIN/1.73M2 — SIGNIFICANT CHANGE UP
EOSINOPHIL # BLD AUTO: 0.16 K/UL — SIGNIFICANT CHANGE UP (ref 0–0.7)
EOSINOPHIL # BLD AUTO: 0.16 K/UL — SIGNIFICANT CHANGE UP (ref 0–0.7)
EOSINOPHIL NFR BLD AUTO: 4.7 % — SIGNIFICANT CHANGE UP (ref 0–8)
EOSINOPHIL NFR BLD AUTO: 4.7 % — SIGNIFICANT CHANGE UP (ref 0–8)
GLUCOSE BLDC GLUCOMTR-MCNC: 264 MG/DL — HIGH (ref 70–99)
GLUCOSE BLDC GLUCOMTR-MCNC: 264 MG/DL — HIGH (ref 70–99)
GLUCOSE BLDC GLUCOMTR-MCNC: 280 MG/DL — HIGH (ref 70–99)
GLUCOSE BLDC GLUCOMTR-MCNC: 280 MG/DL — HIGH (ref 70–99)
GLUCOSE SERPL-MCNC: 304 MG/DL — HIGH (ref 70–99)
GLUCOSE SERPL-MCNC: 304 MG/DL — HIGH (ref 70–99)
HAV IGM SER-ACNC: SIGNIFICANT CHANGE UP
HAV IGM SER-ACNC: SIGNIFICANT CHANGE UP
HBV CORE IGM SER-ACNC: SIGNIFICANT CHANGE UP
HBV CORE IGM SER-ACNC: SIGNIFICANT CHANGE UP
HBV SURFACE AG SER-ACNC: SIGNIFICANT CHANGE UP
HBV SURFACE AG SER-ACNC: SIGNIFICANT CHANGE UP
HCT VFR BLD CALC: 38.6 % — SIGNIFICANT CHANGE UP (ref 37–47)
HCT VFR BLD CALC: 38.6 % — SIGNIFICANT CHANGE UP (ref 37–47)
HCV AB S/CO SERPL IA: 0.22 S/CO — SIGNIFICANT CHANGE UP (ref 0–0.99)
HCV AB S/CO SERPL IA: 0.22 S/CO — SIGNIFICANT CHANGE UP (ref 0–0.99)
HCV AB SERPL-IMP: SIGNIFICANT CHANGE UP
HCV AB SERPL-IMP: SIGNIFICANT CHANGE UP
HGB BLD-MCNC: 12.4 G/DL — SIGNIFICANT CHANGE UP (ref 12–16)
HGB BLD-MCNC: 12.4 G/DL — SIGNIFICANT CHANGE UP (ref 12–16)
IMM GRANULOCYTES NFR BLD AUTO: 0 % — LOW (ref 0.1–0.3)
IMM GRANULOCYTES NFR BLD AUTO: 0 % — LOW (ref 0.1–0.3)
LYMPHOCYTES # BLD AUTO: 1.91 K/UL — SIGNIFICANT CHANGE UP (ref 1.2–3.4)
LYMPHOCYTES # BLD AUTO: 1.91 K/UL — SIGNIFICANT CHANGE UP (ref 1.2–3.4)
LYMPHOCYTES # BLD AUTO: 56.7 % — HIGH (ref 20.5–51.1)
LYMPHOCYTES # BLD AUTO: 56.7 % — HIGH (ref 20.5–51.1)
MCHC RBC-ENTMCNC: 29.9 PG — SIGNIFICANT CHANGE UP (ref 27–31)
MCHC RBC-ENTMCNC: 29.9 PG — SIGNIFICANT CHANGE UP (ref 27–31)
MCHC RBC-ENTMCNC: 32.1 G/DL — SIGNIFICANT CHANGE UP (ref 32–37)
MCHC RBC-ENTMCNC: 32.1 G/DL — SIGNIFICANT CHANGE UP (ref 32–37)
MCV RBC AUTO: 93 FL — SIGNIFICANT CHANGE UP (ref 81–99)
MCV RBC AUTO: 93 FL — SIGNIFICANT CHANGE UP (ref 81–99)
MONOCYTES # BLD AUTO: 0.32 K/UL — SIGNIFICANT CHANGE UP (ref 0.1–0.6)
MONOCYTES # BLD AUTO: 0.32 K/UL — SIGNIFICANT CHANGE UP (ref 0.1–0.6)
MONOCYTES NFR BLD AUTO: 9.5 % — HIGH (ref 1.7–9.3)
MONOCYTES NFR BLD AUTO: 9.5 % — HIGH (ref 1.7–9.3)
NEUTROPHILS # BLD AUTO: 0.93 K/UL — LOW (ref 1.4–6.5)
NEUTROPHILS # BLD AUTO: 0.93 K/UL — LOW (ref 1.4–6.5)
NEUTROPHILS NFR BLD AUTO: 27.6 % — LOW (ref 42.2–75.2)
NEUTROPHILS NFR BLD AUTO: 27.6 % — LOW (ref 42.2–75.2)
NRBC # BLD: 0 /100 WBCS — SIGNIFICANT CHANGE UP (ref 0–0)
NRBC # BLD: 0 /100 WBCS — SIGNIFICANT CHANGE UP (ref 0–0)
PLATELET # BLD AUTO: 309 K/UL — SIGNIFICANT CHANGE UP (ref 130–400)
PLATELET # BLD AUTO: 309 K/UL — SIGNIFICANT CHANGE UP (ref 130–400)
PMV BLD: 10.3 FL — SIGNIFICANT CHANGE UP (ref 7.4–10.4)
PMV BLD: 10.3 FL — SIGNIFICANT CHANGE UP (ref 7.4–10.4)
POTASSIUM SERPL-MCNC: 4.7 MMOL/L — SIGNIFICANT CHANGE UP (ref 3.5–5)
POTASSIUM SERPL-MCNC: 4.7 MMOL/L — SIGNIFICANT CHANGE UP (ref 3.5–5)
POTASSIUM SERPL-SCNC: 4.7 MMOL/L — SIGNIFICANT CHANGE UP (ref 3.5–5)
POTASSIUM SERPL-SCNC: 4.7 MMOL/L — SIGNIFICANT CHANGE UP (ref 3.5–5)
PROT SERPL-MCNC: 6.4 G/DL — SIGNIFICANT CHANGE UP (ref 6–8)
PROT SERPL-MCNC: 6.4 G/DL — SIGNIFICANT CHANGE UP (ref 6–8)
RBC # BLD: 4.15 M/UL — LOW (ref 4.2–5.4)
RBC # BLD: 4.15 M/UL — LOW (ref 4.2–5.4)
RBC # FLD: 12.1 % — SIGNIFICANT CHANGE UP (ref 11.5–14.5)
RBC # FLD: 12.1 % — SIGNIFICANT CHANGE UP (ref 11.5–14.5)
SODIUM SERPL-SCNC: 134 MMOL/L — LOW (ref 135–146)
SODIUM SERPL-SCNC: 134 MMOL/L — LOW (ref 135–146)
WBC # BLD: 3.37 K/UL — LOW (ref 4.8–10.8)
WBC # BLD: 3.37 K/UL — LOW (ref 4.8–10.8)
WBC # FLD AUTO: 3.37 K/UL — LOW (ref 4.8–10.8)
WBC # FLD AUTO: 3.37 K/UL — LOW (ref 4.8–10.8)

## 2023-11-09 PROCEDURE — 93975 VASCULAR STUDY: CPT | Mod: 26

## 2023-11-09 PROCEDURE — 99239 HOSP IP/OBS DSCHRG MGMT >30: CPT

## 2023-11-09 PROCEDURE — 99232 SBSQ HOSP IP/OBS MODERATE 35: CPT | Mod: GC

## 2023-11-09 RX ORDER — NITROFURANTOIN MACROCRYSTAL 50 MG
1 CAPSULE ORAL
Qty: 3 | Refills: 0
Start: 2023-11-09 | End: 2023-11-09

## 2023-11-09 RX ORDER — INSULIN LISPRO 100/ML
5 VIAL (ML) SUBCUTANEOUS
Refills: 0 | Status: DISCONTINUED | OUTPATIENT
Start: 2023-11-09 | End: 2023-11-09

## 2023-11-09 RX ORDER — OFLOXACIN OTIC SOLUTION 3 MG/ML
5 SOLUTION/ DROPS AURICULAR (OTIC)
Qty: 1 | Refills: 0
Start: 2023-11-09 | End: 2023-11-14

## 2023-11-09 RX ORDER — INSULIN GLARGINE 100 [IU]/ML
22 INJECTION, SOLUTION SUBCUTANEOUS
Qty: 0 | Refills: 0 | DISCHARGE

## 2023-11-09 RX ADMIN — Medication 3 UNIT(S): at 09:06

## 2023-11-09 RX ADMIN — OFLOXACIN OTIC SOLUTION 5 DROP(S): 3 SOLUTION/ DROPS AURICULAR (OTIC) at 11:55

## 2023-11-09 RX ADMIN — INSULIN GLARGINE 20 UNIT(S): 100 INJECTION, SOLUTION SUBCUTANEOUS at 09:05

## 2023-11-09 RX ADMIN — Medication 100 MILLIGRAM(S): at 06:17

## 2023-11-09 RX ADMIN — Medication 5 UNIT(S): at 11:55

## 2023-11-09 NOTE — PROGRESS NOTE ADULT - ASSESSMENT
# uncontrolled type 1 DM / COVID +   - A1c 10.4% , on toujeo 30 units daily in am and Afrezza 12 units TIDAC  - FS reviewed   - continue with IV fluid   - cotninue toujeo 22 units daily in am   - continue lispro 5 units TIDAC ,   - CHANGE Sliding scale to 1: 50 > 150 instead of 2: 50 > 150 TIDAC to avoid overcorrection and hypoglycemia   - will follow , discussed with mom switching from Afrezza to lispro SC but patient prefer to stay on Afrezza now 
23 year old female with pmhx of DM type 1 (Uncontrolled, follows Dr. Cornelius) and Hashimoto thyroiditis (Controlled) presents to ed with cough, myalgias, and hyperglycemia x 2 days.  Admitted to MICU for DKA. Gap closed and patient downgraded to medical floor for further management. Hospitalization complicated by sudden elevation of LFT. Admitted for further management.   Hepatology consulted for Acute liver injury.     # Acute liver injury, Hepatocellular pattern AST > ALT. likely from transient ischemia, resolving now.   LIVER FUNCTIONS - ( 08 Nov 2023 11:25 )  Alb: 4.0 g/dL / Pro: 6.7 g/dL / ALK PHOS: 171 U/L / ALT: 442 U/L / AST: 725 U/L / GGT: x         Normal LFT in 11/6   Started Rocephin 11/7   Nitrofurantion 9/23   Bactrim 4/23   Hx of AI disease (hashimoto thyroiditis) and DM type1   RUQ US w doppler unremarkable   CK level  Wnl     PLAN   F/U CLD workup   Avoid hypotension   Please avoid hepatotoxic medications  Monitor LFTs daily     
IMPRESSION:    DKA - resolved  COVID-19 infection  Constipation  HO Hashimoto thyroiditis    PLAN:    NEUROLOGY: Avoid CNS depressants    HEENT: Oral care    CARDIOVASCULAR: Strict I&Os, Keep K>4 and Mg>2.      PULMONARY: HOB @ 45 degrees. Aspiration precautions. Keep SpO2 > 94%    GASTROINTESTINAL: Feeding.  Bowel regimen as tolerated.,      RENAL: Monitor BMP. Correct lytes as needed.      INFECTIOUS DISEASE: Monitor off abx.  Procal 0.1.    ENDOCRINE: Insulin protocol. Keep -180. Endo following    HEME: Monitor CBC, DVT prophylaxis, Dimer noted    MUSCULOSKELETAL: Increase as tolerated    DISPO: DGTF  
## uncontrolled type 1 DM / COVID +   - A1c 10.4% , on toujeo 30 units daily in am and Afrezza 12 units TIDAC  - FS reviewed , hypoglycemia noted   - decrease Lantus to 20 units daily   - decrease  lispro to 3  units TIDAC , hold if NPO   - stop sliding scale and monitor   - discussed with resident 
IMPRESSION:    DKA  COVID-19 infection  Constipation  HO Hashimoto thyroiditis      PLAN:    NEUROLOGY: Avoid CNS depressants    HEENT: Oral care    CARDIOVASCULAR: Daily weights. Strict I&Os, Keep K>4 and Mg>2.      PULMONARY: HOB @ 45 degrees. Aspiration precautions. Keep SpO2 > 94%    GASTROINTESTINAL: Feeding.  Bowel regimen as tolerated.,      RENAL: Monitor BMP. Correct lytes as needed.      INFECTIOUS DISEASE: Monitor off abx.  ID eval for RDSV.  Procal.      ENDOCRINE: Insulin DKA protocol.  transition when AG closes     HEME: Monitor CBC, DVT prophylaxis, Dimer noted    MUSCULOSKELETAL: Increase as tolerated    DISPO: ICU/  Possible DGTF after BW 
IMPRESSION:  Patient has been DGTF, awaiting bed    DKA   - resolved    COVID-19 infection  - Covid day 2    Constipation  - chronic    HO Hashimoto thyroiditis    PLAN:    NEUROLOGY: Avoid CNS depressants    HEENT: Oral care    CARDIOVASCULAR: Strict I&Os, Keep K>4 and Mg>2.      PULMONARY: HOB @ 45 degrees. Aspiration precautions. Keep SpO2 > 94%    GASTROINTESTINAL: Feeding.  Bowel regimen as tolerated.,      RENAL: Monitor BMP. Correct lytes as needed.      INFECTIOUS DISEASE: Monitor off abx.  Procal 0.1.    ENDOCRINE: Insulin protocol. Keep -180. Endo following    HEME: Monitor CBC, DVT prophylaxis, Dimer noted    MUSCULOSKELETAL: Increase as tolerated    DISPO: DGTF  
Pt is a 20yo Female who presents with myalgia, cough, fever, found to have COVID, and hyperglycemia. Admitted to MICU for DKA. Gap closed and patient downgraded to medical floor for further management. Hospitalizatoin complicated by sudden elevation of LFT. Admitted for further management.     #Elevated LFTs  - sudden elevation of LFTs, still elevated on repeat.   - Pending US Liver with Doppler   - Hepatology consult  - Stopped Rocephin in case of DILI    #UTI  - pt reports dysuria  - UA: positive  - UCx never ordered  - on Ceftriaxone switched to Nitrofurantoin on 11/8 for elevated LFTs    #Bilateral Ear Pain  - Patient with previous ear surgery on the right (tympanoplasty? tympanomastoidectomy?) with tympanostomy tube placement. Pt with right otitis externa, possible extruded/clogged tympanostomy tube on the left.   - rec floxin otic 5 drops q12h x 1 week to both ears (active infection in the right ear; drops will help to unclog tymp tube)  - dry ear precautions  - pt needs to f/u with her primary ENT for possible replacement of tubes and audiogram, mom prefers to follow with Dr Jones in NJ  - ENT on the case    #DKA in setting of COVID  - Anion gap closed  - Standing insulin: SubQ lantus given at 9am    #Myalgia, cough due to COVID-19 infection  - pt not on oxygen, not on steroid, or remservirc  - cont supportive management    #Constipation  - Xray KUB done    #uncontrolled type 1 DM   - A1c 10.4% , on toujeo 30 units daily in am and Afrezza 12 units TIDAC  - FS reviewed   - continue with IV fluid   - Was started on toujeo 22 units daily in am and Lispro 5 units TIDAC ,   - A1c 10.4% , on toujeo 30 units daily in am and Afrezza 12 units TIDAC at home  - ENDO discussed with mom switching from Afrezza to lispro SC but patient prefer to stay on Afrezza now   - Lantus decreased to 20U daily, Lispro changed to 3U TID and SS stopped for hypoglycemia according to endo recs      DVT Ppx: enoxaparin Injectable 40 milliGRAM(s) SubCutaneous every 24 hours, patient refusing  GI Ppx: Diet:  Diet, Regular:   Consistent Carbohydrate No Snacks (11-07-23 @ 00:02) [Active]    
IMPRESSION:    #DKA  - Anion gap closing  - Standing insulin: SubQ lantus given at 9am    #Constipation  - Xray KUB done    COVID-19 infection  HO Hashimoto thyroiditis      PLAN:    NEUROLOGY: Avoid CNS depressants    HEENT: Oral care    CARDIOVASCULAR: Daily weights. Strict I&Os, Keep K>4 and Mg>2.      PULMONARY: HOB @ 45 degrees. Aspiration precautions. Keep SpO2 > 94%    GASTROINTESTINAL: Feeding.  Bowel regimen as tolerated.,      RENAL: Monitor BMP. Correct lytes as needed.      INFECTIOUS DISEASE: Monitor off abx.  ID eval for RDSV.  Procal.      ENDOCRINE: Insulin DKA protocol.  transition when AG closes     HEME: Monitor CBC, DVT prophylaxis, Dimer noted    MUSCULOSKELETAL: Increase as tolerated    DISPO: ICU/  Possible DGTF after BW

## 2023-11-09 NOTE — DISCHARGE NOTE NURSING/CASE MANAGEMENT/SOCIAL WORK - NSDCPEFALRISK_GEN_ALL_CORE
For information on Fall & Injury Prevention, visit: [email or web address] OR  [email or web address] OR  https://www.cdc.gov/steadi/patient.html

## 2023-11-09 NOTE — PROGRESS NOTE ADULT - ATTENDING COMMENTS
23 y o  F with T1DM admitted with DKA seen for elevated liver tests  COVID positive. Has myalgias. Sore throat resolved. No hypotensive episode documented.   Treated with ceftriaxone and NSAIDs  No icterus  Abdomen soft non tender  Ext no edema  Tattoos+  Acute hepatitis -  Rapid rise and fall of ALT ? ischemic hepatitis  DILI or Covid can cause as well but was normal at admission  AST > ALT raises possibility of extrahepatic but has ALP elevation. CK normal and  no anemia.

## 2023-11-09 NOTE — PROGRESS NOTE ADULT - PROVIDER SPECIALTY LIST ADULT
Critical Care
Hospitalist
Hepatology
MICU
Critical Care
Internal Medicine
MICU
Endocrinology
Endocrinology

## 2023-11-09 NOTE — DISCHARGE NOTE NURSING/CASE MANAGEMENT/SOCIAL WORK - NSDCPEWEB_GEN_ALL_CORE
[facility] for Tobacco Control website --- [email or web address] website --- [email or web address]

## 2023-11-09 NOTE — DISCHARGE NOTE NURSING/CASE MANAGEMENT/SOCIAL WORK - PATIENT PORTAL LINK FT
You can access the FollowMyHealth Patient Portal offered by [facility] by registering at the following website: [email or web address]. By joining [employer]’s FollowMyHealth portal, you will also be able to view your health information using other applications (apps) compatible with our system.

## 2023-11-09 NOTE — DISCHARGE NOTE NURSING/CASE MANAGEMENT/SOCIAL WORK - NSDCPEEMAIL_GEN_ALL_CORE
[facility] for Tobacco Control email [email or web address]

## 2023-11-09 NOTE — PROGRESS NOTE ADULT - SUBJECTIVE AND OBJECTIVE BOX
Hepatology progress note:     Patient is a 23y old  Female who presents with a chief complaint of DKA (08 Nov 2023 15:48)       Admitted on: 11-05-23    We are following the patient for liver injury     no overnight events       PAST MEDICAL & SURGICAL HISTORY:  DM (diabetes mellitus) type 1 with ketoacidosis      Hashimoto's thyroiditis      History of tonsillectomy          MEDICATIONS  (STANDING):  chlorhexidine 2% Cloths 1 Application(s) Topical <User Schedule>  dextrose 5%. 1000 milliLiter(s) (100 mL/Hr) IV Continuous <Continuous>  dextrose 5%. 1000 milliLiter(s) (100 mL/Hr) IV Continuous <Continuous>  dextrose 5%. 1000 milliLiter(s) (50 mL/Hr) IV Continuous <Continuous>  dextrose 5%. 1000 milliLiter(s) (50 mL/Hr) IV Continuous <Continuous>  dextrose 5%. 1000 milliLiter(s) (100 mL/Hr) IV Continuous <Continuous>  dextrose 50% Injectable 25 Gram(s) IV Push once  dextrose 50% Injectable 12.5 Gram(s) IV Push once  dextrose 50% Injectable 25 Gram(s) IV Push once  dextrose 50% Injectable 12.5 Gram(s) IV Push once  dextrose 50% Injectable 25 Gram(s) IV Push once  dextrose 50% Injectable 25 Gram(s) IV Push once  enoxaparin Injectable 40 milliGRAM(s) SubCutaneous every 24 hours  glucagon  Injectable 1 milliGRAM(s) IntraMuscular once  glucagon  Injectable 1 milliGRAM(s) IntraMuscular once  glucagon  Injectable 1 milliGRAM(s) IntraMuscular once  ibuprofen  Tablet. 400 milliGRAM(s) Oral once  influenza   Vaccine 0.5 milliLiter(s) IntraMuscular once  insulin glargine Injectable (LANTUS) 20 Unit(s) SubCutaneous every morning  insulin lispro Injectable (ADMELOG) 5 Unit(s) SubCutaneous three times a day before meals  nitrofurantoin monohydrate/macrocrystals (MACROBID) 100 milliGRAM(s) Oral two times a day  ofloxacin 0.3% Ophthalmic Solution for OTIC Use 5 Drop(s) Both Ears two times a day  polyethylene glycol 3350 17 Gram(s) Oral every 12 hours  senna 2 Tablet(s) Oral at bedtime    MEDICATIONS  (PRN):  dextrose Oral Gel 15 Gram(s) Oral once PRN Blood Glucose LESS THAN 70 milliGRAM(s)/deciliter  dextrose Oral Gel 15 Gram(s) Oral once PRN Blood Glucose LESS THAN 70 milliGRAM(s)/deciliter  dextrose Oral Gel 15 Gram(s) Oral once PRN Blood Glucose LESS THAN 70 milliGRAM(s)/deciliter      Allergies  No Known Allergies      Review of Systems:   Cardiovascular:  No Chest Pain, No Palpitations  Respiratory:  No Cough, No Dyspnea  Gastrointestinal:  As described in HPI  Skin:  No Skin Lesions, No Jaundice  Neuro:  No Syncope, No Dizziness    Physical Examination:  T(C): 36.4 (11-09-23 @ 12:00), Max: 36.4 (11-09-23 @ 12:00)  HR: 77 (11-09-23 @ 05:31) (77 - 77)  BP: 102/65 (11-09-23 @ 05:31) (102/65 - 102/65)  RR: 16 (11-09-23 @ 05:31) (16 - 16)  SpO2: --        GENERAL: AAOx3, no acute distress.  HEAD:  Atraumatic, Normocephalic  EYES: conjunctiva and sclera clear  NECK: Supple, no JVD or thyromegaly  CHEST/LUNG: Clear to auscultation bilaterally; No wheeze, rhonchi, or rales  HEART: Regular rate and rhythm; normal S1, S2, No murmurs.  ABDOMEN: Soft, nontender, nondistended; Bowel sounds present  NEUROLOGY: No asterixis or tremor.   SKIN: Intact, no jaundice     Data:                        12.4   3.37  )-----------( 309      ( 09 Nov 2023 08:18 )             38.6     Hgb trend:  12.4  11-09-23 @ 08:18  12.4  11-08-23 @ 04:30  12.2  11-07-23 @ 05:16        11-09    134<L>  |  101  |  7<L>  ----------------------------<  304<H>  4.7   |  23  |  0.5<L>    Ca    9.2      09 Nov 2023 08:18  Phos  4.1     11-08  Mg     2.0     11-08    TPro  6.4  /  Alb  3.7  /  TBili  0.2  /  DBili  x   /  AST  240<H>  /  ALT  343<H>  /  AlkPhos  162<H>  11-09    Liver panel trend:  TBili 0.2   /      /      /   AlkP 162   /   Tptn 6.4   /   Alb 3.7    /   DBili --      11-09  TBili 0.3   /      /      /   AlkP 171   /   Tptn 6.7   /   Alb 4.0    /   DBili <0.2      11-08  TBili 0.3   /      /      /   AlkP 162   /   Tptn 6.4   /   Alb 3.8    /   DBili --      11-08  TBili <0.2   /   AST 11   /   ALT 7   /   AlkP 73   /   Tptn 6.3   /   Alb 4.2    /   DBili --      11-06  TBili 0.3   /   AST 26   /   ALT 10   /   AlkP 84   /   Tptn 7.8   /   Alb 4.9    /   DBili --      11-05             Radiology:    US Abdomen Doppler:   ACC: [id number] EXAM:  US DPLX ABDOMEN   ORDERED BY: NAVID DURAND     PROCEDURE DATE:  11/09/2023          INTERPRETATION:  HISTORY: Elevated LFTs. DKA and Covid. Evaluate for   portal vein thrombus.    COMPARISON: None.    TECHNIQUE: Grayscale and duplex Doppler evaluation of vessels of the   liver, spleen, and the IVC.    FINDINGS:  IVC: Normal    Common HA: Patent.    Main PV: Normal hepatopetal flow is present with a normal waveform.  Right PV: Normal hepatopetal flow is present with a normal waveform.  Left PV: Normal hepatopetal flow is present with a normal waveform.    Right HV: Normal  Middle HV: Normal  Left HV: Normal    Splenic vein: Normal    IMPRESSION:    Normal duplex Doppler flow in the liver. No evidence of portal vein   thrombosis    --- End of Report ---          JYOTHI GARCIAS MD; Resident Radiologist  This document has been electronically signed.  VLADIMIR COY MD; Attending Radiologist  This document has been electronically signed. Nov 9 2023 11:50AM (11-09-23 @ 07:56)

## 2023-11-16 DIAGNOSIS — K59.09 OTHER CONSTIPATION: ICD-10-CM

## 2023-11-16 DIAGNOSIS — Z79.4 LONG TERM (CURRENT) USE OF INSULIN: ICD-10-CM

## 2023-11-16 DIAGNOSIS — Z41.8 ENCOUNTER FOR OTHER PROCEDURES FOR PURPOSES OTHER THAN REMEDYING HEALTH STATE: ICD-10-CM

## 2023-11-16 DIAGNOSIS — E10.649 TYPE 1 DIABETES MELLITUS WITH HYPOGLYCEMIA WITHOUT COMA: ICD-10-CM

## 2023-11-16 DIAGNOSIS — Z96.22 MYRINGOTOMY TUBE(S) STATUS: ICD-10-CM

## 2023-11-16 DIAGNOSIS — F17.210 NICOTINE DEPENDENCE, CIGARETTES, UNCOMPLICATED: ICD-10-CM

## 2023-11-16 DIAGNOSIS — E10.10 TYPE 1 DIABETES MELLITUS WITH KETOACIDOSIS WITHOUT COMA: ICD-10-CM

## 2023-11-16 DIAGNOSIS — U07.1 COVID-19: ICD-10-CM

## 2023-11-16 DIAGNOSIS — H60.91 UNSPECIFIED OTITIS EXTERNA, RIGHT EAR: ICD-10-CM

## 2023-11-16 DIAGNOSIS — N39.0 URINARY TRACT INFECTION, SITE NOT SPECIFIED: ICD-10-CM

## 2023-11-16 DIAGNOSIS — K72.00 ACUTE AND SUBACUTE HEPATIC FAILURE WITHOUT COMA: ICD-10-CM

## 2023-11-16 DIAGNOSIS — E06.3 AUTOIMMUNE THYROIDITIS: ICD-10-CM

## 2024-02-27 ENCOUNTER — APPOINTMENT (OUTPATIENT)
Dept: OTOLARYNGOLOGY | Facility: CLINIC | Age: 24
End: 2024-02-27
Payer: COMMERCIAL

## 2024-02-27 VITALS — WEIGHT: 110 LBS | HEIGHT: 66 IN | BODY MASS INDEX: 17.68 KG/M2

## 2024-02-27 DIAGNOSIS — H72.91 UNSPECIFIED PERFORATION OF TYMPANIC MEMBRANE, RIGHT EAR: ICD-10-CM

## 2024-02-27 DIAGNOSIS — H90.11 CONDUCTIVE HEARING LOSS, UNILATERAL, RIGHT EAR, WITH UNRESTRICTED HEARING ON THE CONTRALATERAL SIDE: ICD-10-CM

## 2024-02-27 DIAGNOSIS — H61.21 IMPACTED CERUMEN, RIGHT EAR: ICD-10-CM

## 2024-02-27 DIAGNOSIS — H92.11 OTORRHEA, RIGHT EAR: ICD-10-CM

## 2024-02-27 PROBLEM — E06.3 AUTOIMMUNE THYROIDITIS: Chronic | Status: ACTIVE | Noted: 2023-11-05

## 2024-02-27 PROCEDURE — 99214 OFFICE O/P EST MOD 30 MIN: CPT | Mod: 25

## 2024-02-27 RX ORDER — OFLOXACIN OTIC 3 MG/ML
0.3 SOLUTION AURICULAR (OTIC)
Qty: 1 | Refills: 0 | Status: ACTIVE | COMMUNITY
Start: 2024-02-27 | End: 1900-01-01

## 2024-02-27 NOTE — PHYSICAL EXAM
[de-identified] : Left tube in place vs in canal.  [de-identified] : right impacted wax, otorrhea and t-tube removed. [Normal] : external appearance is normal

## 2024-03-01 LAB — BACTERIA SPEC CULT: ABNORMAL

## 2024-03-12 ENCOUNTER — APPOINTMENT (OUTPATIENT)
Dept: OTOLARYNGOLOGY | Facility: CLINIC | Age: 24
End: 2024-03-12

## 2024-05-06 ENCOUNTER — NON-APPOINTMENT (OUTPATIENT)
Age: 24
End: 2024-05-06

## 2024-05-13 ENCOUNTER — APPOINTMENT (OUTPATIENT)
Dept: ORTHOPEDIC SURGERY | Facility: CLINIC | Age: 24
End: 2024-05-13
Payer: COMMERCIAL

## 2024-05-13 DIAGNOSIS — M75.02 ADHESIVE CAPSULITIS OF LEFT SHOULDER: ICD-10-CM

## 2024-05-13 PROCEDURE — 73030 X-RAY EXAM OF SHOULDER: CPT | Mod: LT

## 2024-05-13 PROCEDURE — 99203 OFFICE O/P NEW LOW 30 MIN: CPT

## 2024-05-13 RX ORDER — MELOXICAM 15 MG/1
15 TABLET ORAL DAILY
Qty: 28 | Refills: 1 | Status: ACTIVE | COMMUNITY
Start: 2024-05-13 | End: 1900-01-01

## 2024-05-13 NOTE — HISTORY OF PRESENT ILLNESS
[de-identified] : The patient is a 23-year-old female, left-hand-dominant, with a past medical history of diabetes and Hashimoto's who is here today for evaluation of left shoulder pain.  There is no specific injury.  She reports that this first started about a week or two ago.  She woke up with pain and inability to range the left shoulder.  She was seen at an urgent care where x-rays were unremarkable.  She reports that today the pain has improved.  She is able to range the shoulder somewhat better.  No radiating arm pain.  No numbness or tingling.  She rates the pain a 7 out of 10 at rest and 10 out of 10 with activity.  She is not currently taking any medications for this.  Past medical/surgical history: Diabetes, Hashimoto's  Allergies: None  Current medications: See list  Family history: Noncontributory  Social history: Single Occasional alcohol use Occasional tobacco use Recreational drug use not reported Works full-time  Review of systems: A 10-point review of systems was positive for diabetes, thyroid problem, otherwise unremarkable as noted on the new patient questionnaire dated 5/13/2024  The patient is well-appearing.  Her height is 5 foot 6 and her weight is 110 pounds.  Examination of the left shoulder demonstrates intact skin.  No swelling and no ecchymosis.  Tender to palpation over the bicipital groove, acromion, and greater tuberosity.  No tenderness over the AC joint.  She is able to actively abduct her shoulder to about 80 degrees before being limited by pain.  I am able to passively abduct her to about 90 degrees.  Negative Neer's and Sorto.  Negative cross body adduction.  She has fairly significant pain with passive external rotation of the shoulder.  4+ out of 5 rotator cuff strength limited by pain.  Neurovascularly intact distally.  Left shoulder x-rays taken in the office today were personally reviewed, demonstrating no evidence of acute fracture.  No significant degenerative changes.  Joint spaces well-preserved.

## 2024-05-13 NOTE — ASSESSMENT
[FreeTextEntry1] : Assessment: Left shoulder pain and decreased range of motion, suspect adhesive capsulitis in the setting of a history of diabetes and Hashimoto's  Plan: 1.  Clinical and radiographic findings reviewed with the patient 2.  I discussed treatment options with her including initial conservative management with NSAIDs and physical therapy.  I sent a prescription for meloxicam to her pharmacy.  NSAID precautions were given.  I provided her with a referral for physical therapy to work on range of motion and strengthening. 3.  I discussed with her that should her symptoms not improve with a trial of NSAIDs and physical therapy, the next option would be a cortisone injection to the left shoulder.  At this point, her symptoms have improved somewhat.  We will defer this for now. 4.  I would like to see her back for follow-up in about 2 months.  Plan of care was discussed with the patient and she is in agreement.  All questions were answered.

## 2024-06-05 ENCOUNTER — NON-APPOINTMENT (OUTPATIENT)
Age: 24
End: 2024-06-05

## 2024-06-26 NOTE — H&P ADULT - NSICDXPASTMEDICALHX_GEN_ALL_CORE_FT
Is the patient currently in the state of [place]? YES    Visit mode:VIDEO    If the visit is dropped, the patient can be reconnected by: VIDEO VISIT: Text to cell phone:   Telephone Information:   Mobile [phone]       Will anyone else be joining the visit? NO  (If patient encounters technical issues they should call [phone]    How would you like to obtain your AVS? MyChart    Are changes needed to the allergy or medication list? N/A    Are refills needed on medications prescribed by this physician? NO     Reason for visit: Consult    Wt other than 24 hrs:  estimation  Pain more than one location:  no  Shu ESPINAL             
PAST MEDICAL HISTORY:  DM (diabetes mellitus) type 1 with ketoacidosis     Hashimoto's thyroiditis

## 2024-07-15 ENCOUNTER — APPOINTMENT (OUTPATIENT)
Dept: ORTHOPEDIC SURGERY | Facility: CLINIC | Age: 24
End: 2024-07-15

## 2024-08-05 ENCOUNTER — INPATIENT (INPATIENT)
Facility: HOSPITAL | Age: 24
LOS: 1 days | Discharge: HOME | DRG: 872 | End: 2024-08-07
Attending: HOSPITALIST | Admitting: STUDENT IN AN ORGANIZED HEALTH CARE EDUCATION/TRAINING PROGRAM
Payer: COMMERCIAL

## 2024-08-05 VITALS
RESPIRATION RATE: 20 BRPM | HEART RATE: 128 BPM | DIASTOLIC BLOOD PRESSURE: 72 MMHG | OXYGEN SATURATION: 99 % | SYSTOLIC BLOOD PRESSURE: 116 MMHG | WEIGHT: 112.66 LBS | TEMPERATURE: 99 F

## 2024-08-05 DIAGNOSIS — Z90.89 ACQUIRED ABSENCE OF OTHER ORGANS: Chronic | ICD-10-CM

## 2024-08-05 LAB
ALBUMIN SERPL ELPH-MCNC: 5 G/DL — SIGNIFICANT CHANGE UP (ref 3.5–5.2)
ALP SERPL-CCNC: 69 U/L — SIGNIFICANT CHANGE UP (ref 30–115)
ALT FLD-CCNC: 10 U/L — SIGNIFICANT CHANGE UP (ref 0–41)
ANION GAP SERPL CALC-SCNC: 17 MMOL/L — HIGH (ref 7–14)
APPEARANCE UR: ABNORMAL
AST SERPL-CCNC: 14 U/L — SIGNIFICANT CHANGE UP (ref 0–41)
B-OH-BUTYR SERPL-SCNC: 0.6 MMOL/L — HIGH
BASE EXCESS BLDV CALC-SCNC: 0.8 MMOL/L — SIGNIFICANT CHANGE UP (ref -2–3)
BASOPHILS # BLD AUTO: 0.09 K/UL — SIGNIFICANT CHANGE UP (ref 0–0.2)
BASOPHILS NFR BLD AUTO: 0.6 % — SIGNIFICANT CHANGE UP (ref 0–1)
BILIRUB SERPL-MCNC: 0.5 MG/DL — SIGNIFICANT CHANGE UP (ref 0.2–1.2)
BILIRUB UR-MCNC: ABNORMAL
BUN SERPL-MCNC: 10 MG/DL — SIGNIFICANT CHANGE UP (ref 10–20)
CA-I SERPL-SCNC: 1.24 MMOL/L — SIGNIFICANT CHANGE UP (ref 1.15–1.33)
CALCIUM SERPL-MCNC: 10.2 MG/DL — SIGNIFICANT CHANGE UP (ref 8.4–10.4)
CHLORIDE SERPL-SCNC: 98 MMOL/L — SIGNIFICANT CHANGE UP (ref 98–110)
CO2 SERPL-SCNC: 22 MMOL/L — SIGNIFICANT CHANGE UP (ref 17–32)
COLOR SPEC: ABNORMAL
CREAT SERPL-MCNC: 0.6 MG/DL — LOW (ref 0.7–1.5)
DIFF PNL FLD: ABNORMAL
EGFR: 131 ML/MIN/1.73M2 — SIGNIFICANT CHANGE UP
EOSINOPHIL # BLD AUTO: 0.11 K/UL — SIGNIFICANT CHANGE UP (ref 0–0.7)
EOSINOPHIL NFR BLD AUTO: 0.7 % — SIGNIFICANT CHANGE UP (ref 0–8)
GAS PNL BLDV: 132 MMOL/L — LOW (ref 136–145)
GAS PNL BLDV: SIGNIFICANT CHANGE UP
GAS PNL BLDV: SIGNIFICANT CHANGE UP
GLUCOSE SERPL-MCNC: 202 MG/DL — HIGH (ref 70–99)
GLUCOSE UR QL: 100 MG/DL
HCG SERPL QL: NEGATIVE — SIGNIFICANT CHANGE UP
HCO3 BLDV-SCNC: 26 MMOL/L — SIGNIFICANT CHANGE UP (ref 22–29)
HCT VFR BLD CALC: 39.3 % — SIGNIFICANT CHANGE UP (ref 37–47)
HCT VFR BLDA CALC: 40 % — SIGNIFICANT CHANGE UP (ref 34.5–46.5)
HGB BLD CALC-MCNC: 13.3 G/DL — SIGNIFICANT CHANGE UP (ref 11.7–16.1)
HGB BLD-MCNC: 13 G/DL — SIGNIFICANT CHANGE UP (ref 12–16)
IMM GRANULOCYTES NFR BLD AUTO: 0.4 % — HIGH (ref 0.1–0.3)
KETONES UR-MCNC: NEGATIVE MG/DL — SIGNIFICANT CHANGE UP
LACTATE BLDV-MCNC: 1.3 MMOL/L — SIGNIFICANT CHANGE UP (ref 0.5–2)
LACTATE SERPL-SCNC: 1.3 MMOL/L — SIGNIFICANT CHANGE UP (ref 0.7–2)
LEUKOCYTE ESTERASE UR-ACNC: ABNORMAL
LIDOCAIN IGE QN: 17 U/L — SIGNIFICANT CHANGE UP (ref 7–60)
LYMPHOCYTES # BLD AUTO: 13.4 % — LOW (ref 20.5–51.1)
LYMPHOCYTES # BLD AUTO: 2.19 K/UL — SIGNIFICANT CHANGE UP (ref 1.2–3.4)
MAGNESIUM SERPL-MCNC: 1.6 MG/DL — LOW (ref 1.8–2.4)
MCHC RBC-ENTMCNC: 31.1 PG — HIGH (ref 27–31)
MCHC RBC-ENTMCNC: 33.1 G/DL — SIGNIFICANT CHANGE UP (ref 32–37)
MCV RBC AUTO: 94 FL — SIGNIFICANT CHANGE UP (ref 81–99)
MONOCYTES # BLD AUTO: 1.01 K/UL — HIGH (ref 0.1–0.6)
MONOCYTES NFR BLD AUTO: 6.2 % — SIGNIFICANT CHANGE UP (ref 1.7–9.3)
NEUTROPHILS # BLD AUTO: 12.89 K/UL — HIGH (ref 1.4–6.5)
NEUTROPHILS NFR BLD AUTO: 78.7 % — HIGH (ref 42.2–75.2)
NITRITE UR-MCNC: POSITIVE
NRBC # BLD: 0 /100 WBCS — SIGNIFICANT CHANGE UP (ref 0–0)
PCO2 BLDV: 43 MMHG — HIGH (ref 39–42)
PH BLDV: 7.39 — SIGNIFICANT CHANGE UP (ref 7.32–7.43)
PH UR: 5.5 — SIGNIFICANT CHANGE UP (ref 5–8)
PHOSPHATE SERPL-MCNC: 3.2 MG/DL — SIGNIFICANT CHANGE UP (ref 2.1–4.9)
PLATELET # BLD AUTO: 354 K/UL — SIGNIFICANT CHANGE UP (ref 130–400)
PMV BLD: 10 FL — SIGNIFICANT CHANGE UP (ref 7.4–10.4)
PO2 BLDV: 37 MMHG — SIGNIFICANT CHANGE UP (ref 25–45)
POTASSIUM BLDV-SCNC: 3.6 MMOL/L — SIGNIFICANT CHANGE UP (ref 3.5–5.1)
POTASSIUM SERPL-MCNC: 3.9 MMOL/L — SIGNIFICANT CHANGE UP (ref 3.5–5)
POTASSIUM SERPL-SCNC: 3.9 MMOL/L — SIGNIFICANT CHANGE UP (ref 3.5–5)
PROT SERPL-MCNC: 7.2 G/DL — SIGNIFICANT CHANGE UP (ref 6–8)
PROT UR-MCNC: 100 MG/DL
RBC # BLD: 4.18 M/UL — LOW (ref 4.2–5.4)
RBC # FLD: 11.9 % — SIGNIFICANT CHANGE UP (ref 11.5–14.5)
SAO2 % BLDV: 61.7 % — LOW (ref 67–88)
SODIUM SERPL-SCNC: 137 MMOL/L — SIGNIFICANT CHANGE UP (ref 135–146)
SP GR SPEC: 1.01 — SIGNIFICANT CHANGE UP (ref 1–1.03)
TROPONIN T, HIGH SENSITIVITY RESULT: <6 NG/L — SIGNIFICANT CHANGE UP (ref 6–13)
UROBILINOGEN FLD QL: 1 MG/DL — SIGNIFICANT CHANGE UP (ref 0.2–1)
WBC # BLD: 16.35 K/UL — HIGH (ref 4.8–10.8)
WBC # FLD AUTO: 16.35 K/UL — HIGH (ref 4.8–10.8)

## 2024-08-05 PROCEDURE — 73030 X-RAY EXAM OF SHOULDER: CPT | Mod: 26,RT

## 2024-08-05 PROCEDURE — 71046 X-RAY EXAM CHEST 2 VIEWS: CPT | Mod: 26

## 2024-08-05 PROCEDURE — 99285 EMERGENCY DEPT VISIT HI MDM: CPT

## 2024-08-05 PROCEDURE — 93010 ELECTROCARDIOGRAM REPORT: CPT

## 2024-08-05 RX ORDER — DEXTROSE MONOHYDRATE, SODIUM CHLORIDE, SODIUM LACTATE, CALCIUM CHLORIDE, MAGNESIUM CHLORIDE 1.5; 538; 448; 18.4; 5.08 G/100ML; MG/100ML; MG/100ML; MG/100ML; MG/100ML
2000 SOLUTION INTRAPERITONEAL ONCE
Refills: 0 | Status: COMPLETED | OUTPATIENT
Start: 2024-08-05 | End: 2024-08-05

## 2024-08-05 RX ORDER — MAGNESIUM SULFATE 500 MG/ML
2 VIAL (ML) INJECTION ONCE
Refills: 0 | Status: COMPLETED | OUTPATIENT
Start: 2024-08-05 | End: 2024-08-05

## 2024-08-05 RX ADMIN — DEXTROSE MONOHYDRATE, SODIUM CHLORIDE, SODIUM LACTATE, CALCIUM CHLORIDE, MAGNESIUM CHLORIDE 2000 MILLILITER(S): 1.5; 538; 448; 18.4; 5.08 SOLUTION INTRAPERITONEAL at 22:59

## 2024-08-05 RX ADMIN — DEXTROSE MONOHYDRATE, SODIUM CHLORIDE, SODIUM LACTATE, CALCIUM CHLORIDE, MAGNESIUM CHLORIDE 2000 MILLILITER(S): 1.5; 538; 448; 18.4; 5.08 SOLUTION INTRAPERITONEAL at 21:16

## 2024-08-05 RX ADMIN — Medication 100 MILLIGRAM(S): at 22:16

## 2024-08-05 RX ADMIN — Medication 25 GRAM(S): at 21:16

## 2024-08-05 NOTE — ED ADULT TRIAGE NOTE - CHIEF COMPLAINT QUOTE
Pt c/o +UA with ketones at urgent care today   endorses IDDM fs have been high 200s   c/o left sided chest pains and syncope this morning in shower

## 2024-08-05 NOTE — ED PROVIDER NOTE - PHYSICAL EXAMINATION
VITAL SIGNS: I have reviewed nursing notes and confirm.  CONSTITUTIONAL: Well-developed; well-nourished; in no acute distress.  SKIN: Skin exam is warm and dry, no acute rash.  HEAD: Normocephalic; atraumatic.  EYES: PERRL, EOM intact; conjunctiva and sclera clear.  ENT: No nasal discharge; airway clear. TMs clear.  NECK: Supple; non tender.  CARD: S1, S2 normal; no murmurs, gallops, or rubs. Regular rate and rhythm.  RESP: Normal respiratory effort, no tachypnea or distress. Lungs CTAB, no wheezes, rales or rhonchi.  ABD: soft, NT/ND.  EXT: Normal ROM. No clubbing, cyanosis or edema.  Neuro: A&Ox3, normal speech, CN II-XII intact, FROM & strength 5/5 x4 extremities. Sensation intact and equal. Normal gait, (-)romberg, no pronator drift, no dysmetria on finger to nose b/l.   PSYCH: Cooperative, appropriate.

## 2024-08-05 NOTE — ED PROVIDER NOTE - EKG/XRAY ADDITIONAL INFORMATION
ED XR prelim, my independent interpretation - Dr. Meredith Esteban: [No fracture or dislocation]   ED CXR prelim, my independent interpretation - Dr. Meredith Esteban: [No PTX, No infiltrates, No Free air]  ED EKG: my independent interpretation - Dr. Meredith Esteban : [118 NSR, no STEMI]  prolong QTC

## 2024-08-05 NOTE — ED PROVIDER NOTE - ATTENDING CONTRIBUTION TO CARE
23-year-old female past medical history of type 1 diabetes, thyroiditis, left frozen shoulder, on inhaled insulin, presents with multiple complaints.  Patient states this morning in the shower she felt lightheaded sat down and passed out for 1 second, did not hit her head.  States her sugar was high at that time not low.  Also has 1 day of urinary symptoms including dysuria.  Complains of right-sided shoulder pain that radiates to left chest constant sharp worse with movement of arm.  No trauma.  No fever cough.  Nausea but no vomiting diarrhea constipation.  Patient states she was seen at urgent care and was diagnosed with UTI and sent to ED.  Patient states her sugar runs high.    On exam, AFVSS, Well appearing, No acute distress, NCAT, EOMI, PERRLA, MMM, Neck supple, LCTAB, RRR nl s1s2 No mrg, Abdomen Soft NTND, no CVA tenderness, AAOx3, No Focal Deficits, No LE edema or calf TTP, right shoulder mild tenderness, skin intact, no deformities, 5 out of 5 motor sensation intact light touch full range of motion active and passive 2+ radial pulse    A/P; syncope chest pain shoulder pain UTI hyperglycemia, rule out ACS arrhythmia DKA UTI give IV fluids reeval

## 2024-08-05 NOTE — ED PROVIDER NOTE - OBJECTIVE STATEMENT
23-year-old female with type 1 diabetes, Hashimoto's thyroiditis, left frozen shoulder that she follows local for presents to ED for evaluation of syncope today.  Patient states she was fine yesterday and went to bed in her usual state of health woke up this morning around 7:30 AM when getting into the shower she felt dizzy, lightheaded like she was going to pass out and then syncopized.  Unsure if head trauma but + LOC.  [place] [facility] [place] was diagnosed with UTI said she had abnormal EKG and was sent to ED for arrival but did not get antibiotics prescribed to her.  Here complains of some generalized weakness but has no other complaints at this time.  Denies fever/chills, vision/hearing changes, neck pain or stiffness, back pain, CP, SOB, palpitations, abdominal pain, N/V/D, extremity numbness/weakness/paresthesias.  + Dysuria and frequency.  LMP 2 weeks ago.  Sexually active monogamous relationship and uses condoms but no other birth control.  States she vapes but denies other cigarette/bad tobacco/drug including stimulant use.

## 2024-08-05 NOTE — ED PROVIDER NOTE - CARE PLAN
1 Principal Discharge DX:	Prolonged QT interval  Secondary Diagnosis:	Acute UTI  Secondary Diagnosis:	Hypomagnesemia

## 2024-08-05 NOTE — ED PROVIDER NOTE - CLINICAL SUMMARY MEDICAL DECISION MAKING FREE TEXT BOX
Patient with prolonged QT greater than 600 and hypomagnesemia, magnesium given IV, leukocytosis and tachycardia noted, no DKA, likely secondary to UTI IV antibiotics given and code sepsis called, admit to telemetry

## 2024-08-05 NOTE — ED PROVIDER NOTE - PROGRESS NOTE DETAILS
PEGGY: Initially tachycardic to 120s on arrival.  EKG shows sinus tachycardia to 118 with QTc of 613.  Given magnesium.  WBC 16, blood cultures drawn, IVF and ABX given.  Magnesium low.  UTI positive.  Admitted to [facility] telemetry for QTc prolongation, UTI.

## 2024-08-05 NOTE — ED ADULT NURSE NOTE - NSFALLUNIVINTERV_ED_ALL_ED
Bed/Stretcher in lowest position, wheels locked, appropriate side rails in place/Call bell, personal items and telephone in reach/Instruct patient to call for assistance before getting out of bed/chair/stretcher/Non-slip footwear applied when patient is off stretcher/[place] to call system/Physically safe environment - no spills, clutter or unnecessary equipment/Purposeful proactive rounding/Room/bathroom lighting operational, light cord in reach

## 2024-08-06 DIAGNOSIS — N39.0 URINARY TRACT INFECTION, SITE NOT SPECIFIED: ICD-10-CM

## 2024-08-06 LAB
ALBUMIN SERPL ELPH-MCNC: 4.2 G/DL — SIGNIFICANT CHANGE UP (ref 3.5–5.2)
ALP SERPL-CCNC: 63 U/L — SIGNIFICANT CHANGE UP (ref 30–115)
ALT FLD-CCNC: 7 U/L — SIGNIFICANT CHANGE UP (ref 0–41)
ANION GAP SERPL CALC-SCNC: 11 MMOL/L — SIGNIFICANT CHANGE UP (ref 7–14)
AST SERPL-CCNC: 10 U/L — SIGNIFICANT CHANGE UP (ref 0–41)
BACTERIA # UR AUTO: ABNORMAL /HPF
BASOPHILS # BLD AUTO: 0.06 K/UL — SIGNIFICANT CHANGE UP (ref 0–0.2)
BASOPHILS NFR BLD AUTO: 0.6 % — SIGNIFICANT CHANGE UP (ref 0–1)
BILIRUB SERPL-MCNC: 0.3 MG/DL — SIGNIFICANT CHANGE UP (ref 0.2–1.2)
BUN SERPL-MCNC: 13 MG/DL — SIGNIFICANT CHANGE UP (ref 10–20)
CALCIUM SERPL-MCNC: 9.2 MG/DL — SIGNIFICANT CHANGE UP (ref 8.4–10.5)
CAST: 0 /LPF — SIGNIFICANT CHANGE UP (ref 0–4)
CHLORIDE SERPL-SCNC: 102 MMOL/L — SIGNIFICANT CHANGE UP (ref 98–110)
CO2 SERPL-SCNC: 23 MMOL/L — SIGNIFICANT CHANGE UP (ref 17–32)
CREAT SERPL-MCNC: 0.5 MG/DL — LOW (ref 0.7–1.5)
EGFR: 137 ML/MIN/1.73M2 — SIGNIFICANT CHANGE UP
EOSINOPHIL # BLD AUTO: 0.22 K/UL — SIGNIFICANT CHANGE UP (ref 0–0.7)
EOSINOPHIL NFR BLD AUTO: 2.1 % — SIGNIFICANT CHANGE UP (ref 0–8)
GLUCOSE BLDC GLUCOMTR-MCNC: 159 MG/DL — HIGH (ref 70–99)
GLUCOSE BLDC GLUCOMTR-MCNC: 182 MG/DL — HIGH (ref 70–99)
GLUCOSE BLDC GLUCOMTR-MCNC: 193 MG/DL — HIGH (ref 70–99)
GLUCOSE BLDC GLUCOMTR-MCNC: 205 MG/DL — HIGH (ref 70–99)
GLUCOSE BLDC GLUCOMTR-MCNC: 226 MG/DL — HIGH (ref 70–99)
GLUCOSE BLDC GLUCOMTR-MCNC: 229 MG/DL — HIGH (ref 70–99)
GLUCOSE BLDC GLUCOMTR-MCNC: 279 MG/DL — HIGH (ref 70–99)
GLUCOSE SERPL-MCNC: 245 MG/DL — HIGH (ref 70–99)
HCT VFR BLD CALC: 35.7 % — LOW (ref 37–47)
HGB BLD-MCNC: 12 G/DL — SIGNIFICANT CHANGE UP (ref 12–16)
IMM GRANULOCYTES NFR BLD AUTO: 0.3 % — SIGNIFICANT CHANGE UP (ref 0.1–0.3)
LACTATE SERPL-SCNC: 0.8 MMOL/L — SIGNIFICANT CHANGE UP (ref 0.7–2)
LYMPHOCYTES # BLD AUTO: 2.39 K/UL — SIGNIFICANT CHANGE UP (ref 1.2–3.4)
LYMPHOCYTES # BLD AUTO: 22.8 % — SIGNIFICANT CHANGE UP (ref 20.5–51.1)
MAGNESIUM SERPL-MCNC: 1.9 MG/DL — SIGNIFICANT CHANGE UP (ref 1.8–2.4)
MCHC RBC-ENTMCNC: 31.8 PG — HIGH (ref 27–31)
MCHC RBC-ENTMCNC: 33.6 G/DL — SIGNIFICANT CHANGE UP (ref 32–37)
MCV RBC AUTO: 94.7 FL — SIGNIFICANT CHANGE UP (ref 81–99)
MONOCYTES # BLD AUTO: 0.6 K/UL — SIGNIFICANT CHANGE UP (ref 0.1–0.6)
MONOCYTES NFR BLD AUTO: 5.7 % — SIGNIFICANT CHANGE UP (ref 1.7–9.3)
NEUTROPHILS # BLD AUTO: 7.17 K/UL — HIGH (ref 1.4–6.5)
NEUTROPHILS NFR BLD AUTO: 68.5 % — SIGNIFICANT CHANGE UP (ref 42.2–75.2)
NRBC # BLD: 0 /100 WBCS — SIGNIFICANT CHANGE UP (ref 0–0)
PHOSPHATE SERPL-MCNC: 3.8 MG/DL — SIGNIFICANT CHANGE UP (ref 2.1–4.9)
PLATELET # BLD AUTO: 312 K/UL — SIGNIFICANT CHANGE UP (ref 130–400)
PMV BLD: 10 FL — SIGNIFICANT CHANGE UP (ref 7.4–10.4)
POTASSIUM SERPL-MCNC: 4.5 MMOL/L — SIGNIFICANT CHANGE UP (ref 3.5–5)
POTASSIUM SERPL-SCNC: 4.5 MMOL/L — SIGNIFICANT CHANGE UP (ref 3.5–5)
PROT SERPL-MCNC: 6.4 G/DL — SIGNIFICANT CHANGE UP (ref 6–8)
RBC # BLD: 3.77 M/UL — LOW (ref 4.2–5.4)
RBC # FLD: 11.9 % — SIGNIFICANT CHANGE UP (ref 11.5–14.5)
RBC CASTS # UR COMP ASSIST: 18 /HPF — HIGH (ref 0–4)
SODIUM SERPL-SCNC: 136 MMOL/L — SIGNIFICANT CHANGE UP (ref 135–146)
SQUAMOUS # UR AUTO: 8 /HPF — HIGH (ref 0–5)
WBC # BLD: 10.47 K/UL — SIGNIFICANT CHANGE UP (ref 4.8–10.8)
WBC # FLD AUTO: 10.47 K/UL — SIGNIFICANT CHANGE UP (ref 4.8–10.8)
WBC UR QL: 530 /HPF — HIGH (ref 0–5)

## 2024-08-06 PROCEDURE — 93306 TTE W/DOPPLER COMPLETE: CPT

## 2024-08-06 PROCEDURE — 85025 COMPLETE CBC W/AUTO DIFF WBC: CPT

## 2024-08-06 PROCEDURE — 93010 ELECTROCARDIOGRAM REPORT: CPT

## 2024-08-06 PROCEDURE — 80307 DRUG TEST PRSMV CHEM ANLYZR: CPT

## 2024-08-06 PROCEDURE — 82962 GLUCOSE BLOOD TEST: CPT

## 2024-08-06 PROCEDURE — 85379 FIBRIN DEGRADATION QUANT: CPT

## 2024-08-06 PROCEDURE — 84100 ASSAY OF PHOSPHORUS: CPT

## 2024-08-06 PROCEDURE — 99223 1ST HOSP IP/OBS HIGH 75: CPT

## 2024-08-06 PROCEDURE — 83605 ASSAY OF LACTIC ACID: CPT

## 2024-08-06 PROCEDURE — 83735 ASSAY OF MAGNESIUM: CPT

## 2024-08-06 PROCEDURE — 93005 ELECTROCARDIOGRAM TRACING: CPT

## 2024-08-06 PROCEDURE — 36415 COLL VENOUS BLD VENIPUNCTURE: CPT

## 2024-08-06 PROCEDURE — 80053 COMPREHEN METABOLIC PANEL: CPT

## 2024-08-06 RX ORDER — ENOXAPARIN SODIUM 120 MG/.8ML
30 INJECTION SUBCUTANEOUS EVERY 24 HOURS
Refills: 0 | Status: DISCONTINUED | OUTPATIENT
Start: 2024-08-06 | End: 2024-08-07

## 2024-08-06 RX ORDER — INSULIN GLARGINE-YFGN 100 [IU]/ML
20 INJECTION, SOLUTION SUBCUTANEOUS AT BEDTIME
Refills: 0 | Status: DISCONTINUED | OUTPATIENT
Start: 2024-08-06 | End: 2024-08-07

## 2024-08-06 RX ORDER — DEXTROSE 4 G
15 TABLET,CHEWABLE ORAL ONCE
Refills: 0 | Status: DISCONTINUED | OUTPATIENT
Start: 2024-08-06 | End: 2024-08-07

## 2024-08-06 RX ORDER — INSULIN LISPRO 100/ML
5 VIAL (ML) SUBCUTANEOUS ONCE
Refills: 0 | Status: COMPLETED | OUTPATIENT
Start: 2024-08-06 | End: 2024-08-06

## 2024-08-06 RX ORDER — DEXTROSE 4 G
25 TABLET,CHEWABLE ORAL ONCE
Refills: 0 | Status: DISCONTINUED | OUTPATIENT
Start: 2024-08-06 | End: 2024-08-07

## 2024-08-06 RX ORDER — LORATADINE 10 MG
17 TABLET,DISINTEGRATING ORAL
Refills: 0 | Status: DISCONTINUED | OUTPATIENT
Start: 2024-08-06 | End: 2024-08-07

## 2024-08-06 RX ORDER — POTASSIUM CHLORIDE 1500 MG/1
40 TABLET, EXTENDED RELEASE ORAL ONCE
Refills: 0 | Status: COMPLETED | OUTPATIENT
Start: 2024-08-06 | End: 2024-08-06

## 2024-08-06 RX ORDER — MAGNESIUM SULFATE 500 MG/ML
2 VIAL (ML) INJECTION ONCE
Refills: 0 | Status: COMPLETED | OUTPATIENT
Start: 2024-08-06 | End: 2024-08-06

## 2024-08-06 RX ORDER — INSULIN GLARGINE-YFGN 100 [IU]/ML
10 INJECTION, SOLUTION SUBCUTANEOUS ONCE
Refills: 0 | Status: COMPLETED | OUTPATIENT
Start: 2024-08-06 | End: 2024-08-06

## 2024-08-06 RX ORDER — ACETAMINOPHEN 500 MG
1000 TABLET ORAL EVERY 8 HOURS
Refills: 0 | Status: DISCONTINUED | OUTPATIENT
Start: 2024-08-06 | End: 2024-08-07

## 2024-08-06 RX ORDER — INSULIN LISPRO 100/ML
VIAL (ML) SUBCUTANEOUS
Refills: 0 | Status: DISCONTINUED | OUTPATIENT
Start: 2024-08-06 | End: 2024-08-07

## 2024-08-06 RX ORDER — GLUCAGON INJECTION, SOLUTION 0.5 MG/.1ML
1 INJECTION, SOLUTION SUBCUTANEOUS ONCE
Refills: 0 | Status: DISCONTINUED | OUTPATIENT
Start: 2024-08-06 | End: 2024-08-07

## 2024-08-06 RX ORDER — DEXTROSE MONOHYDRATE, SODIUM CHLORIDE, SODIUM LACTATE, CALCIUM CHLORIDE, MAGNESIUM CHLORIDE 1.5; 538; 448; 18.4; 5.08 G/100ML; MG/100ML; MG/100ML; MG/100ML; MG/100ML
1000 SOLUTION INTRAPERITONEAL
Refills: 0 | Status: DISCONTINUED | OUTPATIENT
Start: 2024-08-06 | End: 2024-08-07

## 2024-08-06 RX ORDER — INSULIN LISPRO 100/ML
2 VIAL (ML) SUBCUTANEOUS ONCE
Refills: 0 | Status: DISCONTINUED | OUTPATIENT
Start: 2024-08-06 | End: 2024-08-06

## 2024-08-06 RX ORDER — SENNOSIDES 8.6 MG/1
2 TABLET ORAL AT BEDTIME
Refills: 0 | Status: DISCONTINUED | OUTPATIENT
Start: 2024-08-06 | End: 2024-08-07

## 2024-08-06 RX ORDER — DEXTROSE 4 G
12.5 TABLET,CHEWABLE ORAL ONCE
Refills: 0 | Status: DISCONTINUED | OUTPATIENT
Start: 2024-08-06 | End: 2024-08-07

## 2024-08-06 RX ORDER — INSULIN LISPRO 100/ML
5 VIAL (ML) SUBCUTANEOUS
Refills: 0 | Status: DISCONTINUED | OUTPATIENT
Start: 2024-08-06 | End: 2024-08-07

## 2024-08-06 RX ADMIN — Medication 2: at 10:44

## 2024-08-06 RX ADMIN — Medication 17 GRAM(S): at 23:51

## 2024-08-06 RX ADMIN — Medication 5 UNIT(S): at 12:58

## 2024-08-06 RX ADMIN — Medication 3: at 18:31

## 2024-08-06 RX ADMIN — Medication 5 UNIT(S): at 22:21

## 2024-08-06 RX ADMIN — Medication 5 UNIT(S): at 18:31

## 2024-08-06 RX ADMIN — POTASSIUM CHLORIDE 40 MILLIEQUIVALENT(S): 1500 TABLET, EXTENDED RELEASE ORAL at 02:31

## 2024-08-06 RX ADMIN — Medication 25 GRAM(S): at 01:28

## 2024-08-06 RX ADMIN — Medication 1000 MILLIGRAM(S): at 20:04

## 2024-08-06 RX ADMIN — Medication 100 MILLIGRAM(S): at 22:22

## 2024-08-06 RX ADMIN — SENNOSIDES 2 TABLET(S): 8.6 TABLET ORAL at 23:52

## 2024-08-06 RX ADMIN — INSULIN GLARGINE-YFGN 10 UNIT(S): 100 INJECTION, SOLUTION SUBCUTANEOUS at 22:21

## 2024-08-06 RX ADMIN — DEXTROSE MONOHYDRATE, SODIUM CHLORIDE, SODIUM LACTATE, CALCIUM CHLORIDE, MAGNESIUM CHLORIDE 60 MILLILITER(S): 1.5; 538; 448; 18.4; 5.08 SOLUTION INTRAPERITONEAL at 02:52

## 2024-08-06 NOTE — H&P ADULT - ATTENDING COMMENTS
#Presyncope  lightheadedness, vision blacked out but no syncope  reported by mother; pt declined interview  ua positive; f/u ucx  ceftriaxone  rvp  drug screen if able  initially ekg qtc 600s, repeat ekg wnl  monitor on tele  orthostatics  tsh, tte    #Progress Note Handoff  Pending (specify): orthostatics, tte, tele  Family discussion: d/w pt, mother at bedside  Disposition: home

## 2024-08-06 NOTE — H&P ADULT - NSHPPHYSICALEXAM_GEN_ALL_CORE
CONSTITUTIONAL: Well-developed; well-nourished; in no acute distress.  SKIN: Skin exam is warm and dry, no acute rash.  HEAD: Normocephalic; atraumatic.  EYES: PERRL, EOM intact; conjunctiva and sclera clear.  ENT: No nasal discharge; airway clear. TMs clear.  NECK: Supple; non tender.  CARD:  tachycardic , S1, S2 normal; no murmurs, gallops, or rubs. Regular rhythm.  RESP: Normal respiratory effort, no tachypnea or distress. Lungs CTAB, no wheezes, rales or rhonchi.  ABD: soft, NT/ND.  EXT: Normal ROM. No clubbing, cyanosis or edema.  Neuro: A&Ox3, normal speech, CN II-XII intact, FROM & strength 5/5 x4 extremities. Sensation intact and equal. Normal gait, (-)romberg, no pronator drift, no dysmetria on finger to nose b/l.   PSYCH: Cooperative, appropriate.

## 2024-08-06 NOTE — PATIENT PROFILE ADULT - FUNCTIONAL ASSESSMENT - BASIC MOBILITY 6.
4-calculated by average/Not able to assess (calculate score using [facility] averaging method)

## 2024-08-06 NOTE — H&P ADULT - HISTORY OF PRESENT ILLNESS
23-year-old female past medical history of type 1 diabetes, thyroiditis, left frozen shoulder, on inhaled insulin, presents with multiple complaints.  Patient states this morning in the shower she felt lightheaded sat down and passed out for 1 second, did not hit her head.  States her sugar was high at that time not low.  Also has 1 day of urinary symptoms including dysuria and frequency .  Complains of right-sided shoulder pain that radiates to left chest constant sharp worse with movement of arm.  No trauma.  No fever cough.  Nausea but no vomiting diarrhea constipation Patient states she was seen at urgent care and was diagnosed with UTI and sent to ED.  Patient states her sugar runs high.    In ED vitals   /74 , T 98.9 ,  , O2 99% RA   EKG sinus tachycardia w/ QTc 613     CBC w/ leukocytosis 16 K , Hb 13 , PLTs 354  CMP within normal limits   Lactate and Trops are (-)   Serum pregnancy test is (-)   UA +   CXR and Shoulder xray unremarkable.     FS on admission 196 and BHB 0.6         s/p 2L IV fluids , 2 g magnesium  admitted to medicine for UTI , syncope eval

## 2024-08-06 NOTE — H&P ADULT - NSHPLABSRESULTS_GEN_ALL_CORE
LABS:      CBC Full  -  ( 05 Aug 2024 20:59 )  WBC Count : 16.35 K/uL  RBC Count : 4.18 M/uL  Hemoglobin : 13.0 g/dL  Hematocrit : 39.3 %  Platelet Count - Automated : 354 K/uL  Mean Cell Volume : 94.0 fL  Mean Cell Hemoglobin : 31.1 pg  Mean Cell Hemoglobin Concentration : 33.1 g/dL  Auto Neutrophil # : 12.89 K/uL  Auto Lymphocyte # : 2.19 K/uL  Auto Monocyte # : 1.01 K/uL  Auto Eosinophil # : 0.11 K/uL  Auto Basophil # : 0.09 K/uL  Auto Neutrophil % : 78.7 %  Auto Lymphocyte % : 13.4 %  Auto Monocyte % : 6.2 %  Auto Eosinophil % : 0.7 %  Auto Basophil % : 0.6 %        137  |  98  |  10  ----------------------------<  202<H>  3.9   |  22  |  0.6<L>    Ca    10.2      05 Aug 2024 20:53  Phos  3.2     08-05  Mg     1.6     08-05    TPro  7.2  /  Alb  5.0  /  TBili  0.5  /  DBili  x   /  AST  14  /  ALT  10  /  AlkPhos  69  08-05          Urinalysis Basic - ( 05 Aug 2024 23:22 )    Color: Orange / Appearance: Turbid / S.010 / pH: x  Gluc: x / Ketone: Negative mg/dL  / Bili: Small / Urobili: 1.0 mg/dL   Blood: x / Protein: 100 mg/dL / Nitrite: Positive   Leuk Esterase: Large / RBC: x / WBC x   Sq Epi: x / Non Sq Epi: x / Bacteria: x                RADIOLOGY & ADDITIONAL STUDIES (The following images were personally reviewed):

## 2024-08-06 NOTE — H&P ADULT - ASSESSMENT
23-year-old female past medical history of type 1 diabetes, thyroiditis, left frozen shoulder, on inhaled insulin, presents with multiple complaints.  Patient states this morning in the shower she felt lightheaded sat down and passed out for 1 second, did not hit her head.  States her sugar was high at that time not low.  Also has 1 day of urinary symptoms including dysuria and frequency .  Complains of right-sided shoulder pain that radiates to left chest constant sharp worse with movement of arm.  No trauma.  No fever cough.  Nausea but no vomiting diarrhea constipation Patient states she was seen at urgent care and was diagnosed with UTI and sent to ED.  Patient states her sugar runs high. admitted to med as a case of UTI , and for syncope eval.     #Syncope  #Prolonged QTc   #Hypomagnesemia   - EKG , sinus tachycardia w/ QTc 613   - Trops (-) one set   - Repeat EKG and check QTc  - Tele monitoring   - Replete electrolytes , keep mag > 2.2 and K > 4       #UTI - cystitis -   #Sepsis POA   - + Urinary symptoms , and +UA  - pt has hx of UTI , no previous cultures available   - s/p IV fluids 2 L in ED , urine cultures and blood cx are sent ( septic on admission > source ,  , leukocytosis )   - c/w Rocephin 1g q24 for cystitis and follow urine cx       #uncontrolled type 1 DM   - last A1c 10.4% ( 11/2023 ) , on toujeo 30 units daily in am and Afrezza 12 units TIDAC at home   - FS reviewed = 196 , BHB 0.6 , no DKA on admission   - s/p 2 L IV fluids , c/w hydration for 24 hours   - ENDO discussed with mom last admission switching from Afrezza to lispro SC but patient prefer to stay on Afrezza now   - Start basal bolus insulin and SSC and adjust per FS.   - carb consistent diet       #DVT ppx: enoxaparin Injectable 30 milliGRAM(s) SubCutaneous every 24 hours  #GI Ppx: no need   #Diet: : Consistent Carbohydrate No Snacks (11-07-23 @ 00:02) [Active]     23-year-old female past medical history of type 1 diabetes, thyroiditis, left frozen shoulder, on inhaled insulin, presents with multiple complaints.  Patient states this morning in the shower she felt lightheaded sat down and passed out for 1 second, did not hit her head.  States her sugar was high at that time not low.  Also has 1 day of urinary symptoms including dysuria and frequency .  Complains of right-sided shoulder pain that radiates to left chest constant sharp worse with movement of arm.  No trauma.  No fever cough.  Nausea but no vomiting diarrhea constipation Patient states she was seen at urgent care and was diagnosed with UTI and sent to ED.  Patient states her sugar runs high. admitted to med as a case of UTI , and for syncope eval.     #Syncope  #Prolonged QTc   #Hypomagnesemia   - EKG , sinus tachycardia w/ QTc 613   - Trops (-) one set   - Repeat EKG and check QTc  - Tele monitoring   - Replete electrolytes , keep mag > 2.2 and K > 4       #UTI - cystitis -   #Sepsis POA   #Leukocytosis   - + Urinary symptoms , and +UA  - pt has hx of UTI , no previous cultures available   - s/p IV fluids 2 L in ED , urine cultures and blood cx are sent ( septic on admission > source ,  , leukocytosis )   - c/w Rocephin 1g q24 for cystitis and follow urine cx       #uncontrolled type 1 DM   - last A1c 10.4% ( 11/2023 ) , on toujeo 30 units daily in am and Afrezza 12 units TIDAC at home   - FS reviewed = 196 , BHB 0.6 , no DKA on admission   - s/p 2 L IV fluids , c/w hydration for 24 hours   - ENDO discussed with mom last admission switching from Afrezza to lispro SC but patient prefer to stay on Afrezza now   - Start basal bolus insulin and SSC and adjust per FS.   - carb consistent diet       #DVT ppx: enoxaparin Injectable 30 milliGRAM(s) SubCutaneous every 24 hours  #GI Ppx: no need   #Diet: : Consistent Carbohydrate No Snacks (11-07-23 @ 00:02) [Active]     23-year-old female past medical history of type 1 diabetes, thyroiditis, left frozen shoulder, on inhaled insulin, presents with multiple complaints.  Patient states this morning in the shower she felt lightheaded sat down and passed out for 1 second, did not hit her head.  States her sugar was high at that time not low.  Also has 1 day of urinary symptoms including dysuria and frequency .  Complains of right-sided shoulder pain that radiates to left chest constant sharp worse with movement of arm.  No trauma.  No fever cough.  Nausea but no vomiting diarrhea constipation Patient states she was seen at urgent care and was diagnosed with UTI and sent to ED.  Patient states her sugar runs high. admitted to med as a case of UTI , and for syncope eval.     #Syncope  #Prolonged QTc   #Hypomagnesemia   - EKG , sinus tachycardia w/ QTc 613   - Trops (-) one set   - Repeat EKG , QTc was 477   - Tele monitoring for now   - Replete electrolytes , keep mag > 2.2 and K > 4       #UTI - cystitis -   #Sepsis POA   #Leukocytosis   - + Urinary symptoms , and +UA  - pt has hx of UTI , no previous cultures available   - s/p IV fluids 2 L in ED , urine cultures and blood cx are sent ( septic on admission > source ,  , leukocytosis )   - c/w Rocephin 1g q24 for cystitis and follow urine cx       #uncontrolled type 1 DM   - last A1c 10.4% ( 11/2023 ) , on toujeo 30 units daily in am and Afrezza 12 units TIDAC at home   - FS reviewed = 196 , BHB 0.6 , no DKA on admission   - s/p 2 L IV fluids , c/w hydration for 24 hours   - ENDO discussed with mom last admission switching from Afrezza to lispro SC but patient prefer to stay on Afrezza now   - Start basal bolus insulin and SSC and adjust per FS.   - carb consistent diet       #DVT ppx: enoxaparin Injectable 30 milliGRAM(s) SubCutaneous every 24 hours  #GI Ppx: no need   #Diet: : Consistent Carbohydrate No Snacks (11-07-23 @ 00:02) [Active]

## 2024-08-06 NOTE — PATIENT PROFILE ADULT - FALL HARM RISK - UNIVERSAL INTERVENTIONS
Bed in lowest position, wheels locked, appropriate side rails in place/Call bell, personal items and telephone in reach/Instruct patient to call for assistance before getting out of bed or chair/Non-slip footwear when patient is out of bed/[place] to call system/Physically safe environment - no spills, clutter or unnecessary equipment/Purposeful Proactive Rounding/Room/bathroom lighting operational, light cord in reach

## 2024-08-07 ENCOUNTER — TRANSCRIPTION ENCOUNTER (OUTPATIENT)
Age: 24
End: 2024-08-07

## 2024-08-07 ENCOUNTER — RESULT REVIEW (OUTPATIENT)
Age: 24
End: 2024-08-07

## 2024-08-07 VITALS
SYSTOLIC BLOOD PRESSURE: 105 MMHG | OXYGEN SATURATION: 98 % | TEMPERATURE: 98 F | HEART RATE: 85 BPM | RESPIRATION RATE: 18 BRPM | DIASTOLIC BLOOD PRESSURE: 71 MMHG

## 2024-08-07 LAB
CULTURE RESULTS: ABNORMAL
D DIMER BLD IA.RAPID-MCNC: 201 NG/ML DDU — SIGNIFICANT CHANGE UP
GLUCOSE BLDC GLUCOMTR-MCNC: 176 MG/DL — HIGH (ref 70–99)
GLUCOSE BLDC GLUCOMTR-MCNC: 179 MG/DL — HIGH (ref 70–99)
GLUCOSE BLDC GLUCOMTR-MCNC: 194 MG/DL — HIGH (ref 70–99)
GLUCOSE BLDC GLUCOMTR-MCNC: 315 MG/DL — HIGH (ref 70–99)
SPECIMEN SOURCE: SIGNIFICANT CHANGE UP

## 2024-08-07 PROCEDURE — 99239 HOSP IP/OBS DSCHRG MGMT >30: CPT

## 2024-08-07 PROCEDURE — 93306 TTE W/DOPPLER COMPLETE: CPT | Mod: 26

## 2024-08-07 PROCEDURE — 99223 1ST HOSP IP/OBS HIGH 75: CPT

## 2024-08-07 RX ORDER — CEFPODOXIME PROXETIL 100 MG
1 TABLET ORAL
Qty: 10 | Refills: 0
Start: 2024-08-07 | End: 2024-08-11

## 2024-08-07 RX ORDER — INSULIN LISPRO 100/ML
6 VIAL (ML) SUBCUTANEOUS
Refills: 0 | Status: DISCONTINUED | OUTPATIENT
Start: 2024-08-07 | End: 2024-08-07

## 2024-08-07 RX ORDER — KETOROLAC TROMETHAMINE 10 MG
15 TABLET ORAL ONCE
Refills: 0 | Status: DISCONTINUED | OUTPATIENT
Start: 2024-08-07 | End: 2024-08-07

## 2024-08-07 RX ORDER — INSULIN GLARGINE-YFGN 100 [IU]/ML
23 INJECTION, SOLUTION SUBCUTANEOUS AT BEDTIME
Refills: 0 | Status: DISCONTINUED | OUTPATIENT
Start: 2024-08-07 | End: 2024-08-07

## 2024-08-07 RX ORDER — METHOCARBAMOL 500 MG
500 TABLET ORAL ONCE
Refills: 0 | Status: COMPLETED | OUTPATIENT
Start: 2024-08-07 | End: 2024-08-07

## 2024-08-07 RX ORDER — INSULIN GLARGINE-YFGN 100 [IU]/ML
28 INJECTION, SOLUTION SUBCUTANEOUS
Qty: 1 | Refills: 0
Start: 2024-08-07 | End: 2024-09-05

## 2024-08-07 RX ADMIN — Medication 6 UNIT(S): at 13:11

## 2024-08-07 RX ADMIN — Medication 1: at 08:53

## 2024-08-07 RX ADMIN — Medication 500 MILLIGRAM(S): at 01:21

## 2024-08-07 RX ADMIN — Medication 4: at 13:12

## 2024-08-07 RX ADMIN — Medication 5 UNIT(S): at 08:53

## 2024-08-07 NOTE — CONSULT NOTE ADULT - ASSESSMENT
24 yo F with hx of DM1 who p/w syncope while taking a shower. Found to have UTI with hyperglycemia.    Impression:  Syncope  Hyperglycemia  (+) UTI  DM1    Plan:  - TTE  - Cont tele monitoring  - Manage glucose levels  - Treat UTI  - Will place MCOT x 2 weeks upon discharge 22 yo F with hx of DM1 who p/w syncope while taking a shower. Found to have UTI with hyperglycemia.    Impression:  Syncope likely vasovagal  Hyperglycemia  (+) UTI  DM1    Plan:  - TTE  - Cont tele monitoring  - Manage glucose levels  - Treat UTI  - Will place MCOT x 2 weeks upon discharge

## 2024-08-07 NOTE — DISCHARGE NOTE PROVIDER - NSDCMRMEDTOKEN_GEN_ALL_CORE_FT
Afrezza 12 units inhalation powder: 12 unit(s) inhaled 3 times a day  ofloxacin 0.3% otic solution: 5 drop(s) to each affected ear 2 times a day  Totomaszo SoloStar 300 units/mL subcutaneous solution: 22 international unit(s) subcutaneous once a day   Afrezza 12 units inhalation powder: 12 unit(s) inhaled 3 times a day  cefpodoxime 100 mg oral tablet: 1 tab(s) orally 2 times a day  Cale Feliz SoloStar 300 units/mL subcutaneous solution: 28 international unit(s) subcutaneous once a day

## 2024-08-07 NOTE — CONSULT NOTE ADULT - SUBJECTIVE AND OBJECTIVE BOX
Patient is a 23y old  Female who presents with a chief complaint of syncope (06 Aug 2024 00:31)    HPI: Patient is a 23-year-old female past medical history of type 1 diabetes, thyroiditis, left frozen shoulder, on inhaled insulin, presents with multiple complaints.  Patient states this morning in the shower she felt lightheaded sat down and passed out for 1 second, did not hit her head. States her sugar was high at that time not low.  Also has 1 day of urinary symptoms including dysuria and frequency .  Complains of right-sided shoulder pain that radiates to left chest constant sharp worse with movement of arm.  No trauma.  No fever cough.  Nausea but no vomiting diarrhea constipation Patient states she was seen at urgent care and was diagnosed with UTI and sent to ED.  Patient states her sugar runs high. EP consulted for syncope and evaluation for MCOT.    PAST MEDICAL & SURGICAL HISTORY:  DM (diabetes mellitus) type 1 with ketoacidosis      Hashimoto's thyroiditis      History of tonsillectomy    PREVIOUS DIAGNOSTIC TESTING:      ECHO  FINDINGS:    STRESS  FINDINGS:    CATHETERIZATION  FINDINGS:    ELECTROPHYSIOLOGY STUDY  FINDINGS:    CAROTID ULTRASOUND:  FINDINGS    VENOUS DUPLEX SCAN:  FINDINGS:    CHEST CT PULMONARY ANGIO with IV Contrast:  FINDINGS:    MEDICATIONS  (STANDING):  cefTRIAXone   IVPB 1000 milliGRAM(s) IV Intermittent every 24 hours  dextrose 5%. 1000 milliLiter(s) (100 mL/Hr) IV Continuous <Continuous>  dextrose 5%. 1000 milliLiter(s) (50 mL/Hr) IV Continuous <Continuous>  dextrose 50% Injectable 25 Gram(s) IV Push once  dextrose 50% Injectable 12.5 Gram(s) IV Push once  dextrose 50% Injectable 25 Gram(s) IV Push once  enoxaparin Injectable 30 milliGRAM(s) SubCutaneous every 24 hours  glucagon  Injectable 1 milliGRAM(s) IntraMuscular once  insulin glargine Injectable (LANTUS) 23 Unit(s) SubCutaneous at bedtime  insulin lispro (ADMELOG) corrective regimen sliding scale   SubCutaneous three times a day before meals  insulin lispro Injectable (ADMELOG) 6 Unit(s) SubCutaneous three times a day before meals  lactated ringers. 1000 milliLiter(s) (60 mL/Hr) IV Continuous <Continuous>  polyethylene glycol 3350 17 Gram(s) Oral two times a day  senna 2 Tablet(s) Oral at bedtime    MEDICATIONS  (PRN):  acetaminophen     Tablet .. 1000 milliGRAM(s) Oral every 8 hours PRN Temp greater or equal to 38C (100.4F), Mild Pain (1 - 3)  dextrose Oral Gel 15 Gram(s) Oral once PRN Blood Glucose LESS THAN 70 milliGRAM(s)/deciliter      FAMILY HISTORY: No significant hx    SOCIAL HISTORY: +Vaping; Social ETOH use; No illicit drugs    Past Surgical History: No significant hx    Allergies:  No Known Allergies      REVIEW OF SYSTEMS:  CONSTITUTIONAL: No fever, weight loss, chills, shakes, or fatigue  RESPIRATORY: No cough, wheezing, hemoptysis, or shortness of breath  CARDIOVASCULAR: No chest pain, dyspnea, palpitations, dizziness, syncope, paroxysmal nocturnal dyspnea, orthopnea, or arm or leg swelling  GASTROINTESTINAL: No abdominal  or epigastric pain, nausea, vomiting, hematemesis, diarrhea, constipation, melena or bright red blood.  NEUROLOGICAL: +Syncope; No headaches, memory loss, slurred speech, limb weakness, loss of strength, numbness, or tremors  MUSCULOSKELETAL: No joint pain or swelling, muscle, back, or extremity pain      Vital Signs Last 24 Hrs  T(C): 36.7 (07 Aug 2024 08:12), Max: 36.8 (06 Aug 2024 23:48)  T(F): 98.1 (07 Aug 2024 08:12), Max: 98.3 (06 Aug 2024 23:48)  HR: 81 (07 Aug 2024 08:12) (81 - 95)  BP: 104/61 (07 Aug 2024 08:12) (100/71 - 108/73)  BP(mean): --  RR: 18 (07 Aug 2024 08:12) (18 - 18)  SpO2: 98% (07 Aug 2024 08:12) (97% - 98%)    Parameters below as of 07 Aug 2024 08:12  Patient On (Oxygen Delivery Method): room air        PHYSICAL EXAM:  GENERAL: In no apparent distress, well nourished, and hydrated.  NECK: Supple, No JVD   HEART: Regular rate and rhythm; No murmurs, rubs, or gallops.  PULMONARY: Clear to auscultation and perfusion.  No rales, wheezing, or rhonchi bilaterally.  EXTREMITIES:  2+ Peripheral Pulses, no LE edema BL  NEUROLOGICAL: Grossly nonfocal      INTERPRETATION OF TELEMETRY: SR 72 bpm    ECG:  < from: 12 Lead ECG (08.06.24 @ 01:02) >  Ventricular Rate 93 BPM    Atrial Rate 93 BPM    P-R Interval 118 ms    QRS Duration 80 ms    Q-T Interval 384 ms    QTC Calculation(Bazett) 477 ms    P Axis 58 degrees    R Axis 91 degrees    T Axis 51 degrees    Diagnosis Line Normal sinus rhythmwith sinus arrhythmia  Rightward axis  Borderline ECG    Confirmed by Darrian Nuñez ([id number]) on 8/6/2024 9:37:08 AM    < end of copied text >    I&O's Detail      LABS:                        12.0   10.47 )-----------( 312      ( 06 Aug 2024 13:55 )             35.7     08-06    136  |  102  |  13  ----------------------------<  245<H>  4.5   |  23  |  0.5<L>    Ca    9.2      06 Aug 2024 13:55  Phos  3.8     08-06  Mg     1.9     08-06    TPro  6.4  /  Alb  4.2  /  TBili  0.3  /  DBili  x   /  AST  10  /  ALT  7   /  AlkPhos  63  08-06          Urinalysis Basic - ( 06 Aug 2024 13:55 )    Color: x / Appearance: x / SG: x / pH: x  Gluc: 245 mg/dL / Ketone: x  / Bili: x / Urobili: x   Blood: x / Protein: x / Nitrite: x   Leuk Esterase: x / RBC: x / WBC x   Sq Epi: x / Non Sq Epi: x / Bacteria: x      BNP  I&O's Detail    Daily     Daily     RADIOLOGY & ADDITIONAL STUDIES:

## 2024-08-07 NOTE — DISCHARGE NOTE PROVIDER - ATTENDING DISCHARGE PHYSICAL EXAMINATION:
Attending attestation  Attending DC note  Pt seen and examined at bedside. no cp or sob. Likely vasovagel. outpt follow up cardiology, will follow up UClx  vitals, labs, exam stable  Hospital course as above.  Plan dw pt and agreed to plan  Medically cleared for DC. Med recc completed.  [facility] resident. Spent 32 mins on case

## 2024-08-07 NOTE — DISCHARGE NOTE PROVIDER - PROVIDER TOKENS
PROVIDER:[TOKEN:[[id number]],FOLLOWUP:[1 week]],PROVIDER:[TOKEN:[[id number]],FOLLOWUP:[2 weeks]],PROVIDER:[TOKEN:[[id number]],FOLLOWUP:[1 week]]

## 2024-08-07 NOTE — DISCHARGE NOTE NURSING/CASE MANAGEMENT/SOCIAL WORK - NSDCPEEMAIL_GEN_ALL_CORE
[facility] email [email or web address]

## 2024-08-07 NOTE — DISCHARGE NOTE PROVIDER - NSDCCPCAREPLAN_GEN_ALL_CORE_FT
PRINCIPAL DISCHARGE DIAGNOSIS  Diagnosis: Prolonged QT interval  Assessment and Plan of Treatment: You presented to the hosptal after an episode of syncope, painful urination and increased urination frequency. You were found to have UTI, low magnesium, high glucose and sinus tachycardia. You were treated wth IV fluids, insulin and antibiotics. Follow up outpatient with an endocrinologist and cardiologist. continuee to take all our home medications as prescribed, Return to the hospital if your symptoms persist or worsens.      SECONDARY DISCHARGE DIAGNOSES  Diagnosis: Acute UTI  Assessment and Plan of Treatment:     Diagnosis: Hypomagnesemia  Assessment and Plan of Treatment:      PRINCIPAL DISCHARGE DIAGNOSIS  Diagnosis: Syncope  Assessment and Plan of Treatment: You presented to the hosptal after an episode of syncope, painful urination and increased urination frequency. You were found to have UTI, low magnesium, high glucose and sinus tachycardia. You were treated wth intravenous fluids, insulin and antibiotics. Follow up outpatient with an endocrinologist and cardiologist. continuee to take all our home medications as prescribed, Return to the hospital if your symptoms persist or worsens.      SECONDARY DISCHARGE DIAGNOSES  Diagnosis: Acute UTI  Assessment and Plan of Treatment:     Diagnosis: Hypomagnesemia  Assessment and Plan of Treatment:

## 2024-08-07 NOTE — DISCHARGE NOTE PROVIDER - CARE PROVIDER_API CALL
Hannah Frank  Endocrinology/Metab/Diabetes  [address] [place] [address]  Phone: [phone]  Fax: [phone]  Follow Up Time: 1 week    Andreina Suggs  Cardiac Electrophysiology  [address] [place] [id number]  Phone: [phone]  Fax: [phone]  Follow Up Time: 2 weeks    Misti Leahy  Pediatrics  [address], [address]  [place] [address]  Phone: [phone]  Fax: [phone]  Follow Up Time: 1 week

## 2024-08-07 NOTE — DISCHARGE NOTE NURSING/CASE MANAGEMENT/SOCIAL WORK - NSDCPEFALRISK_GEN_ALL_CORE
For information on Fall & Injury Prevention, visit: [email or web address] OR  [email or web address] OR  https://www.cdc.gov/steadi/patient.html

## 2024-08-07 NOTE — DISCHARGE NOTE PROVIDER - DISCHARGE DATE
Problem: Aspiration - Risk of  Goal: *Absence of aspiration  Outcome: Progressing Towards Goal     Problem: Patient Education: Go to Patient Education Activity  Goal: Patient/Family Education  Outcome: Progressing Towards Goal     Problem: Pressure Injury - Risk of  Goal: *Prevention of pressure injury  Description: Document Leandro Scale and appropriate interventions in the flowsheet. Outcome: Progressing Towards Goal  Note: Pressure Injury Interventions:       Moisture Interventions: Absorbent underpads    Activity Interventions: Assess need for specialty bed, Increase time out of bed, Pressure redistribution bed/mattress(bed type)         Nutrition Interventions: Document food/fluid/supplement intake                     Problem: Patient Education: Go to Patient Education Activity  Goal: Patient/Family Education  Outcome: Progressing Towards Goal     Problem: Falls - Risk of  Goal: *Absence of Falls  Description: Document Lamont Vegrara Fall Risk and appropriate interventions in the flowsheet.   Outcome: Progressing Towards Goal  Note: Fall Risk Interventions:                                Problem: Patient Education: Go to Patient Education Activity  Goal: Patient/Family Education  Outcome: Progressing Towards Goal     Problem: Patient Education: Go to Patient Education Activity  Goal: Patient/Family Education  Outcome: Progressing Towards Goal 07-Aug-2024

## 2024-08-07 NOTE — DISCHARGE NOTE PROVIDER - HOSPITAL COURSE
23-year-old female past medical history of type 1 diabetes, thyroiditis, left frozen shoulder, on inhaled insulin, presents with multiple complaints.  Patient states this morning in the shower she felt lightheaded sat down and passed out for 1 second, did not hit her head.  States her sugar was high at that time not low.  Also has 1 day of urinary symptoms including dysuria and frequency.  Complains of right-sided shoulder pain that radiates to left chest constant sharp worse with movement of arm.  No trauma.  No fever cough.  Nausea but no vomiting diarrhea constipation Patient states she was seen at urgent care and was diagnosed with UTI and sent to ED.  Patient states her sugar runs high.    In ED vitals   /74 , T 98.9 ,  , O2 99% RA   EKG sinus tachycardia w/ QTc 613     CBC w/ leukocytosis 16 K , Hb 13 , PLTs 354  CMP within normal limits   Lactate and Trops are (-)   Serum pregnancy test is (-)   UA +   CXR and Shoulder xray unremarkable.   FS on admission 196 and BHB 0.6   s/p 2L IV fluids , 2 g magnesium  admitted to medicine for UTI , syncope eval     #Syncope  #Prolonged QTc   #Hypomagnesemia   - EKG , sinus tachycardia w/ QTc 613   - Trops (-) one set   - Repeat EKG , QTc was 477     #UTI - cystitis  #Sepsis POA   #Leukocytosis   - + Urinary symptoms , and +UA  - pt has hx of UTI , no previous cultures available   - s/p IV fluids 2 L in ED , urine cultures and blood cx are sent ( septic on admission > source ,  , leukocytosis )   - c/w Rocephin 1g q24 for cystitis and follow urine cx     #uncontrolled type 1 DM   - last A1c 10.4% ( 11/2023 ) , on toujeo 30 units daily in am and Afrezza 12 units TIDAC at home   - FS reviewed = 196 , BHB 0.6 , no DKA on admission   - s/p 2 L IV fluids , c/w hydration for 24 hours   - ENDO discussed with mom last admission switching from Afrezza to lispro SC but patient prefer to stay on Afrezza now   - Start basal bolus insulin and SSC and adjust per FS.   - carb consistent diet      23-year-old female past medical history of type 1 diabetes, thyroiditis, left frozen shoulder, on inhaled insulin, presents with multiple complaints.  Patient states this morning in the shower she felt lightheaded sat down and passed out for 1 second, did not hit her head.  States her sugar was high at that time not low.  Also has 1 day of urinary symptoms including dysuria and frequency.  Complains of right-sided shoulder pain that radiates to left chest constant sharp worse with movement of arm.  No trauma.  No fever cough.  Nausea but no vomiting diarrhea constipation Patient states she was seen at urgent care and was diagnosed with UTI and sent to ED.  Patient states her sugar runs high.    In ED vitals   /74 , T 98.9 ,  , O2 99% RA   EKG sinus tachycardia w/ QTc 613     CBC w/ leukocytosis 16 K , Hb 13 , PLTs 354  CMP within normal limits   Lactate and Trops are (-)   Serum pregnancy test is (-)   UA +   CXR and Shoulder xray unremarkable.   FS on admission 196 and BHB 0.6   s/p 2L IV fluids , 2 g magnesium  admitted to medicine for UTI , syncope eval     #Syncope  #Prolonged QTc   #Hypomagnesemia   - EKG , sinus tachycardia w/ QTc 613   - Trops (-) one set   - Repeat EKG , QTc was 477     #UTI - cystitis  #Sepsis POA   #Leukocytosis   - + Urinary symptoms , and +UA  - pt has hx of UTI , no previous cultures available   - s/p IV fluids 2 L in ED , urine cultures and blood cx are sent ( septic on admission > source ,  , leukocytosis )   - c/w Rocephin 1g q24 for cystitis and follow urine cx     #uncontrolled type 1 DM   - last A1c 10.4% ( 11/2023 ) , on toujeo 30 units daily in am and Afrezza 12 units TIDAC at home   - FS reviewed = 196 , BHB 0.6 , no DKA on admission   - s/p 2 L IV fluids , c/w hydration for 24 hours   - ENDO discussed with mom last admission switching from Afrezza to lispro SC but patient prefer to stay on Afrezza now   - Start basal bolus insulin and SSC and adjust per FS.   - carb consistent diet    Discussion of discharge plan of care, including discharge diagnoses, medication reconciliation, and follow-ups, was conducted with Dr. Odalys Larry on 08/07/24, and discharge was approved.     23-year-old female past medical history of type 1 diabetes, thyroiditis, left frozen shoulder, on inhaled insulin, presents with multiple complaints.  Patient states this morning in the shower she felt lightheaded sat down and passed out for 1 second, did not hit her head.  States her sugar was high at that time not low.  Also has 1 day of urinary symptoms including dysuria and frequency.  Complains of right-sided shoulder pain that radiates to left chest constant sharp worse with movement of arm.  No trauma.  No fever cough.  Nausea but no vomiting diarrhea constipation Patient states she was seen at urgent care and was diagnosed with UTI and sent to ED.  Patient states her sugar runs high.    In ED vitals   /74 , T 98.9 ,  , O2 99% RA   EKG sinus tachycardia w/ QTc 613     CBC w/ leukocytosis 16 K , Hb 13 , PLTs 354  CMP within normal limits   Lactate and Trops are (-)   Serum pregnancy test is (-)   UA +   CXR and Shoulder xray unremarkable.   FS on admission 196 and BHB 0.6   s/p 2L IV fluids , 2 g magnesium  admitted to medicine for UTI , syncope eval     #Syncope  #Prolonged QTc   #Hypomagnesemia   - EKG , sinus tachycardia w/ QTc 613   - Trops (-) one set   - Repeat EKG , QTc was 477     #UTI - cystitis  #Sepsis POA   #Leukocytosis   - + Urinary symptoms , and +UA  - pt has hx of UTI , no previous cultures available   - s/p IV fluids 2 L in ED , urine cultures and blood cx are sent ( septic on admission > source ,  , leukocytosis )   - c/w Rocephin 1g q24 for cystitis and follow urine cx     #uncontrolled type 1 DM   - last A1c 10.4% ( 11/2023 ) , on toujeo 30 units daily in am and Afrezza 12 units TIDAC at home   - FS reviewed = 196 , BHB 0.6 , no DKA on admission   - s/p 2 L IV fluids , c/w hydration for 24 hours   - ENDO discussed with mom last admission switching from Afrezza to lispro SC but patient prefer to stay on Afrezza now. Follow up outpatient with Endocrinology.  - Start basal bolus insulin and SSC and adjust per FS.   - carb consistent diet    Discussion of discharge plan of care, including discharge diagnoses, medication reconciliation, and follow-ups, was conducted with Dr. Odalys Larry on 08/07/24, and discharge was approved.

## 2024-08-07 NOTE — DISCHARGE NOTE NURSING/CASE MANAGEMENT/SOCIAL WORK - PATIENT PORTAL LINK FT
You can access the FollowMyHealth Patient Portal offered by [facility] by registering at the following website: [email or web address]. By joining [employer]’s FollowMyHealth portal, you will also be able to view your health information using other applications (apps) compatible with our system.

## 2024-08-07 NOTE — DISCHARGE NOTE PROVIDER - CARE PROVIDERS DIRECT ADDRESSES
[email or web address]

## 2024-08-07 NOTE — DISCHARGE NOTE NURSING/CASE MANAGEMENT/SOCIAL WORK - NSDCPEWEB_GEN_ALL_CORE
[facility] for Tobacco Control website --- [email or web address] website --- [email or web address]

## 2024-08-08 NOTE — CHART NOTE - NSCHARTNOTEFT_GEN_A_CORE
Culture - Urine (08.05.24 @ 23:22)    Specimen Source: Clean Catch None    Culture Results:   10,000 - 49,000 CFU/mL Staphylococcus saprophyticus  ***********Note************  Routine susceptibility testing of these urine isolates is not advised.  Due to the high antibiotic concentration achieved in urine,  uncomplicated urinary tract infections can be treated with commonly used  antibiotics (e.g. Nitrofurantoin, Trimethoprim/Sulfamethoxazole or  Fluoroquinolones).      Urine culture followed up. Pt sent on Vantin. Likely to have self resolution> DW ID no further changes

## 2024-08-09 LAB
AMPHET UR-MCNC: NEGATIVE NG/ML — SIGNIFICANT CHANGE UP
BARBITURATES UR QL SCN: NEGATIVE NG/ML — SIGNIFICANT CHANGE UP
BARBITURATES UR-MCNC: NEGATIVE NG/ML — SIGNIFICANT CHANGE UP
BENZODIAZ UR-MCNC: NEGATIVE NG/ML — SIGNIFICANT CHANGE UP
COCAINE METAB.OTHER UR-MCNC: NEGATIVE NG/ML — SIGNIFICANT CHANGE UP
CREATININE, URINE THERAPEUTIC: 76.6 MG/DL — SIGNIFICANT CHANGE UP (ref 20–300)
FENTANYL RESULT, UR: NEGATIVE NG/ML — SIGNIFICANT CHANGE UP
FENTANYL UR QL SCN: NEGATIVE NG/ML — SIGNIFICANT CHANGE UP
METHADONE UR QL SCN: NEGATIVE NG/ML — SIGNIFICANT CHANGE UP
OPIATES UR-MCNC: NEGATIVE NG/ML — SIGNIFICANT CHANGE UP
OXYCODONE UR QL SCN: NEGATIVE NG/ML — SIGNIFICANT CHANGE UP
PCP UR-MCNC: NEGATIVE NG/ML — SIGNIFICANT CHANGE UP
PH, URINE RESULT: 6.1 — SIGNIFICANT CHANGE UP (ref 4.5–8.9)
THC UR QL: NEGATIVE NG/ML — SIGNIFICANT CHANGE UP

## 2024-08-11 LAB
CULTURE RESULTS: SIGNIFICANT CHANGE UP
CULTURE RESULTS: SIGNIFICANT CHANGE UP
SPECIMEN SOURCE: SIGNIFICANT CHANGE UP
SPECIMEN SOURCE: SIGNIFICANT CHANGE UP

## 2024-08-15 DIAGNOSIS — E06.3 AUTOIMMUNE THYROIDITIS: ICD-10-CM

## 2024-08-15 DIAGNOSIS — N39.0 URINARY TRACT INFECTION, SITE NOT SPECIFIED: ICD-10-CM

## 2024-08-15 DIAGNOSIS — E83.42 HYPOMAGNESEMIA: ICD-10-CM

## 2024-08-15 DIAGNOSIS — R55 SYNCOPE AND COLLAPSE: ICD-10-CM

## 2024-08-15 DIAGNOSIS — A41.9 SEPSIS, UNSPECIFIED ORGANISM: ICD-10-CM

## 2024-08-15 DIAGNOSIS — B95.7 OTHER STAPHYLOCOCCUS AS THE CAUSE OF DISEASES CLASSIFIED ELSEWHERE: ICD-10-CM

## 2024-08-15 DIAGNOSIS — E10.65 TYPE 1 DIABETES MELLITUS WITH HYPERGLYCEMIA: ICD-10-CM

## 2024-08-15 DIAGNOSIS — R94.31 ABNORMAL ELECTROCARDIOGRAM [ECG] [EKG]: ICD-10-CM

## 2024-08-29 ENCOUNTER — APPOINTMENT (OUTPATIENT)
Dept: ELECTROPHYSIOLOGY | Facility: CLINIC | Age: 24
End: 2024-08-29
Payer: COMMERCIAL

## 2024-08-29 VITALS
WEIGHT: 116 LBS | HEART RATE: 81 BPM | HEIGHT: 66 IN | DIASTOLIC BLOOD PRESSURE: 70 MMHG | BODY MASS INDEX: 18.64 KG/M2 | SYSTOLIC BLOOD PRESSURE: 120 MMHG

## 2024-08-29 DIAGNOSIS — R55 SYNCOPE AND COLLAPSE: ICD-10-CM

## 2024-08-29 PROCEDURE — 99214 OFFICE O/P EST MOD 30 MIN: CPT | Mod: 25

## 2024-08-29 PROCEDURE — 93000 ELECTROCARDIOGRAM COMPLETE: CPT

## 2024-08-29 RX ORDER — INSULIN GLARGINE 300 U/ML
300 INJECTION, SOLUTION SUBCUTANEOUS
Refills: 0 | Status: ACTIVE | COMMUNITY

## 2024-08-29 RX ORDER — INSULIN HUMAN 4-8-12(60)
KIT INHALATION
Refills: 0 | Status: ACTIVE | COMMUNITY

## 2024-08-29 NOTE — ASSESSMENT
[FreeTextEntry1] : 23-year -old female with IDDM, thyroiditis presenting for a post-hospital follow-up to discuss results of the cardiac event monitor. Results discussed with patient and mother, overall benign without evidence of arrhythmia. Recommend evaluation by pulmonary medicine for cough, shortness of breath and wheezing. No further EP intervention at this time. Advised hydration and situation avoidance. If recurrent syncope or dizziness, I recommend a loop recorder implantation. Follow-up as needed.

## 2024-08-29 NOTE — HISTORY OF PRESENT ILLNESS
[FreeTextEntry1] : 23-year -old female with IDDM, thyroiditis presenting for a post-hospital follow-up to discuss results of the cardiac event monitor. She denies recurrent syncope or dizziness while wearing the monitor. She reports occasional chest tightness and shortness of breath. She is accompanied by her mother.   -EKG today: Sinus rhythm at 81 bpm.  -TTE: 8/7/2024  1. Normal global left ventricular systolic function.  2. LV Ejection Fraction by Casillas's Method with a biplane EF of 63 %.  3. Normal left atrial size.  4. Normal right atrial size.  5. Trace mitral valve regurgitation.  6. There is no evidence of pericardial effusion. -Event monitor: No arrhythmia, occasional PVC (<1% burden), patient-triggered episodes correlate with sinus tachycardia with light activity, and sinus rhythm at rest.   -Family hx: no SCD or premature CAD -Social hx: vaping, social EtOH use, no illicit drugs

## 2024-11-12 ENCOUNTER — APPOINTMENT (OUTPATIENT)
Dept: OTOLARYNGOLOGY | Facility: CLINIC | Age: 24
End: 2024-11-12
Payer: COMMERCIAL

## 2024-11-12 VITALS — HEIGHT: 66 IN | WEIGHT: 111 LBS | BODY MASS INDEX: 17.84 KG/M2

## 2024-11-12 DIAGNOSIS — T16.2XXA FOREIGN BODY IN LEFT EAR, INITIAL ENCOUNTER: ICD-10-CM

## 2024-11-12 DIAGNOSIS — J30.0 VASOMOTOR RHINITIS: ICD-10-CM

## 2024-11-12 DIAGNOSIS — H90.0 CONDUCTIVE HEARING LOSS, BILATERAL: ICD-10-CM

## 2024-11-12 DIAGNOSIS — H60.311 DIFFUSE OTITIS EXTERNA, RIGHT EAR: ICD-10-CM

## 2024-11-12 PROCEDURE — 69200 CLEAR OUTER EAR CANAL: CPT | Mod: LT

## 2024-11-12 PROCEDURE — 99214 OFFICE O/P EST MOD 30 MIN: CPT | Mod: 25

## 2024-11-12 PROCEDURE — 31231 NASAL ENDOSCOPY DX: CPT

## 2024-11-12 PROCEDURE — 92550 TYMPANOMETRY & REFLEX THRESH: CPT | Mod: 52

## 2024-11-12 PROCEDURE — 92557 COMPREHENSIVE HEARING TEST: CPT

## 2024-11-12 RX ORDER — OFLOXACIN OTIC 3 MG/ML
0.3 SOLUTION AURICULAR (OTIC) TWICE DAILY
Qty: 1 | Refills: 0 | Status: ACTIVE | COMMUNITY
Start: 2024-11-12 | End: 1900-01-01

## 2024-11-12 RX ORDER — FLUTICASONE PROPIONATE 50 UG/1
50 SPRAY, METERED NASAL
Qty: 1 | Refills: 3 | Status: ACTIVE | COMMUNITY
Start: 2024-11-12 | End: 1900-01-01

## 2024-12-10 ENCOUNTER — APPOINTMENT (OUTPATIENT)
Dept: OTOLARYNGOLOGY | Facility: CLINIC | Age: 24
End: 2024-12-10
Payer: COMMERCIAL

## 2024-12-10 VITALS — HEIGHT: 66 IN | BODY MASS INDEX: 17.36 KG/M2 | WEIGHT: 108 LBS

## 2024-12-10 DIAGNOSIS — J30.0 VASOMOTOR RHINITIS: ICD-10-CM

## 2024-12-10 DIAGNOSIS — H74.12 ADHESIVE LEFT MIDDLE EAR DISEASE: ICD-10-CM

## 2024-12-10 DIAGNOSIS — H60.311 DIFFUSE OTITIS EXTERNA, RIGHT EAR: ICD-10-CM

## 2024-12-10 DIAGNOSIS — H90.0 CONDUCTIVE HEARING LOSS, BILATERAL: ICD-10-CM

## 2024-12-10 PROCEDURE — 92557 COMPREHENSIVE HEARING TEST: CPT

## 2024-12-10 PROCEDURE — 92504 EAR MICROSCOPY EXAMINATION: CPT

## 2024-12-10 PROCEDURE — 99214 OFFICE O/P EST MOD 30 MIN: CPT

## 2024-12-10 PROCEDURE — 92550 TYMPANOMETRY & REFLEX THRESH: CPT | Mod: 52

## 2025-04-14 ENCOUNTER — EMERGENCY (EMERGENCY)
Facility: HOSPITAL | Age: 25
LOS: 0 days | Discharge: HOME | End: 2025-04-14
Attending: STUDENT IN AN ORGANIZED HEALTH CARE EDUCATION/TRAINING PROGRAM
Payer: COMMERCIAL

## 2025-04-14 VITALS
RESPIRATION RATE: 20 BRPM | HEIGHT: 66 IN | SYSTOLIC BLOOD PRESSURE: 132 MMHG | DIASTOLIC BLOOD PRESSURE: 87 MMHG | TEMPERATURE: 98 F | OXYGEN SATURATION: 100 % | WEIGHT: 110.01 LBS | HEART RATE: 113 BPM

## 2025-04-14 VITALS — HEART RATE: 78 BPM

## 2025-04-14 DIAGNOSIS — Z79.4 LONG TERM (CURRENT) USE OF INSULIN: ICD-10-CM

## 2025-04-14 DIAGNOSIS — E06.3 AUTOIMMUNE THYROIDITIS: ICD-10-CM

## 2025-04-14 DIAGNOSIS — R52 PAIN, UNSPECIFIED: ICD-10-CM

## 2025-04-14 DIAGNOSIS — E10.65 TYPE 1 DIABETES MELLITUS WITH HYPERGLYCEMIA: ICD-10-CM

## 2025-04-14 DIAGNOSIS — R53.83 OTHER FATIGUE: ICD-10-CM

## 2025-04-14 DIAGNOSIS — R11.0 NAUSEA: ICD-10-CM

## 2025-04-14 DIAGNOSIS — Z90.89 ACQUIRED ABSENCE OF OTHER ORGANS: Chronic | ICD-10-CM

## 2025-04-14 LAB
ALBUMIN SERPL ELPH-MCNC: 4.7 G/DL — SIGNIFICANT CHANGE UP (ref 3.5–5.2)
ALP SERPL-CCNC: 68 U/L — SIGNIFICANT CHANGE UP (ref 30–115)
ALT FLD-CCNC: 7 U/L — SIGNIFICANT CHANGE UP (ref 0–41)
ANION GAP SERPL CALC-SCNC: 14 MMOL/L — SIGNIFICANT CHANGE UP (ref 7–14)
APPEARANCE UR: CLEAR — SIGNIFICANT CHANGE UP
AST SERPL-CCNC: 12 U/L — SIGNIFICANT CHANGE UP (ref 0–41)
B-OH-BUTYR SERPL-SCNC: 0.9 MMOL/L — HIGH
BASOPHILS # BLD AUTO: 0.05 K/UL — SIGNIFICANT CHANGE UP (ref 0–0.2)
BASOPHILS NFR BLD AUTO: 0.6 % — SIGNIFICANT CHANGE UP (ref 0–1)
BILIRUB SERPL-MCNC: 0.3 MG/DL — SIGNIFICANT CHANGE UP (ref 0.2–1.2)
BILIRUB UR-MCNC: NEGATIVE — SIGNIFICANT CHANGE UP
BUN SERPL-MCNC: 10 MG/DL — SIGNIFICANT CHANGE UP (ref 10–20)
CALCIUM SERPL-MCNC: 10.1 MG/DL — SIGNIFICANT CHANGE UP (ref 8.4–10.5)
CHLORIDE SERPL-SCNC: 100 MMOL/L — SIGNIFICANT CHANGE UP (ref 98–110)
CO2 SERPL-SCNC: 23 MMOL/L — SIGNIFICANT CHANGE UP (ref 17–32)
COLOR SPEC: YELLOW — SIGNIFICANT CHANGE UP
CREAT SERPL-MCNC: 0.6 MG/DL — LOW (ref 0.7–1.5)
DIFF PNL FLD: ABNORMAL
EGFR: 130 ML/MIN/1.73M2 — SIGNIFICANT CHANGE UP
EGFR: 130 ML/MIN/1.73M2 — SIGNIFICANT CHANGE UP
EOSINOPHIL # BLD AUTO: 0.11 K/UL — SIGNIFICANT CHANGE UP (ref 0–0.7)
EOSINOPHIL NFR BLD AUTO: 1.4 % — SIGNIFICANT CHANGE UP (ref 0–8)
GAS PNL BLDV: SIGNIFICANT CHANGE UP
GLUCOSE SERPL-MCNC: 249 MG/DL — HIGH (ref 70–99)
GLUCOSE UR QL: >=1000 MG/DL
HCG SERPL QL: NEGATIVE — SIGNIFICANT CHANGE UP
HCT VFR BLD CALC: 37.7 % — SIGNIFICANT CHANGE UP (ref 37–47)
HGB BLD-MCNC: 12.5 G/DL — SIGNIFICANT CHANGE UP (ref 12–16)
IMM GRANULOCYTES NFR BLD AUTO: 0.1 % — SIGNIFICANT CHANGE UP (ref 0.1–0.3)
KETONES UR-MCNC: 40 MG/DL
LEUKOCYTE ESTERASE UR-ACNC: NEGATIVE — SIGNIFICANT CHANGE UP
LYMPHOCYTES # BLD AUTO: 1.89 K/UL — SIGNIFICANT CHANGE UP (ref 1.2–3.4)
LYMPHOCYTES # BLD AUTO: 24.3 % — SIGNIFICANT CHANGE UP (ref 20.5–51.1)
MCHC RBC-ENTMCNC: 31 PG — SIGNIFICANT CHANGE UP (ref 27–31)
MCHC RBC-ENTMCNC: 33.2 G/DL — SIGNIFICANT CHANGE UP (ref 32–37)
MCV RBC AUTO: 93.5 FL — SIGNIFICANT CHANGE UP (ref 81–99)
MONOCYTES # BLD AUTO: 0.42 K/UL — SIGNIFICANT CHANGE UP (ref 0.1–0.6)
MONOCYTES NFR BLD AUTO: 5.4 % — SIGNIFICANT CHANGE UP (ref 1.7–9.3)
NEUTROPHILS # BLD AUTO: 5.31 K/UL — SIGNIFICANT CHANGE UP (ref 1.4–6.5)
NEUTROPHILS NFR BLD AUTO: 68.2 % — SIGNIFICANT CHANGE UP (ref 42.2–75.2)
NITRITE UR-MCNC: NEGATIVE — SIGNIFICANT CHANGE UP
NRBC BLD AUTO-RTO: 0 /100 WBCS — SIGNIFICANT CHANGE UP (ref 0–0)
PH UR: 6.5 — SIGNIFICANT CHANGE UP (ref 5–8)
PLATELET # BLD AUTO: 386 K/UL — SIGNIFICANT CHANGE UP (ref 130–400)
PMV BLD: 10.1 FL — SIGNIFICANT CHANGE UP (ref 7.4–10.4)
POTASSIUM SERPL-MCNC: 4.3 MMOL/L — SIGNIFICANT CHANGE UP (ref 3.5–5)
POTASSIUM SERPL-SCNC: 4.3 MMOL/L — SIGNIFICANT CHANGE UP (ref 3.5–5)
PROT SERPL-MCNC: 7.8 G/DL — SIGNIFICANT CHANGE UP (ref 6–8)
PROT UR-MCNC: NEGATIVE MG/DL — SIGNIFICANT CHANGE UP
RBC # BLD: 4.03 M/UL — LOW (ref 4.2–5.4)
RBC # FLD: 12.2 % — SIGNIFICANT CHANGE UP (ref 11.5–14.5)
SODIUM SERPL-SCNC: 137 MMOL/L — SIGNIFICANT CHANGE UP (ref 135–146)
SP GR SPEC: 1.03 — SIGNIFICANT CHANGE UP (ref 1–1.03)
UROBILINOGEN FLD QL: 0.2 MG/DL — SIGNIFICANT CHANGE UP (ref 0.2–1)
WBC # BLD: 7.79 K/UL — SIGNIFICANT CHANGE UP (ref 4.8–10.8)
WBC # FLD AUTO: 7.79 K/UL — SIGNIFICANT CHANGE UP (ref 4.8–10.8)

## 2025-04-14 PROCEDURE — 84132 ASSAY OF SERUM POTASSIUM: CPT

## 2025-04-14 PROCEDURE — 85018 HEMOGLOBIN: CPT

## 2025-04-14 PROCEDURE — 36000 PLACE NEEDLE IN VEIN: CPT

## 2025-04-14 PROCEDURE — 71046 X-RAY EXAM CHEST 2 VIEWS: CPT

## 2025-04-14 PROCEDURE — 84295 ASSAY OF SERUM SODIUM: CPT

## 2025-04-14 PROCEDURE — 80053 COMPREHEN METABOLIC PANEL: CPT

## 2025-04-14 PROCEDURE — 81001 URINALYSIS AUTO W/SCOPE: CPT

## 2025-04-14 PROCEDURE — 99283 EMERGENCY DEPT VISIT LOW MDM: CPT | Mod: 25

## 2025-04-14 PROCEDURE — 82962 GLUCOSE BLOOD TEST: CPT

## 2025-04-14 PROCEDURE — 82010 KETONE BODYS QUAN: CPT

## 2025-04-14 PROCEDURE — 84703 CHORIONIC GONADOTROPIN ASSAY: CPT

## 2025-04-14 PROCEDURE — 85014 HEMATOCRIT: CPT

## 2025-04-14 PROCEDURE — 85025 COMPLETE CBC W/AUTO DIFF WBC: CPT

## 2025-04-14 PROCEDURE — 99284 EMERGENCY DEPT VISIT MOD MDM: CPT

## 2025-04-14 PROCEDURE — 36415 COLL VENOUS BLD VENIPUNCTURE: CPT

## 2025-04-14 PROCEDURE — 82803 BLOOD GASES ANY COMBINATION: CPT

## 2025-04-14 PROCEDURE — 82330 ASSAY OF CALCIUM: CPT

## 2025-04-14 PROCEDURE — 83605 ASSAY OF LACTIC ACID: CPT

## 2025-04-14 PROCEDURE — 71046 X-RAY EXAM CHEST 2 VIEWS: CPT | Mod: 26

## 2025-04-14 RX ORDER — CEFPODOXIME PROXETIL 200 MG/1
1 TABLET, FILM COATED ORAL
Qty: 14 | Refills: 0
Start: 2025-04-14 | End: 2025-04-20

## 2025-04-14 RX ADMIN — Medication 1000 MILLILITER(S): at 15:30

## 2025-04-14 NOTE — ED PROVIDER NOTE - PATIENT PORTAL LINK FT
You can access the FollowMyHealth Patient Portal offered by [facility] by registering at the following website: [email or web address]. By joining [employer]’s FollowMyHealth portal, you will also be able to view your health information using other applications (apps) compatible with our system.

## 2025-04-14 NOTE — ED PROVIDER NOTE - OBJECTIVE STATEMENT
24-year-old female past medical history of Hashimoto's type 1 diabetes on insulin presenting to the ED for hyperglycemia.  Patient states over the past few days she has been "not feeling well".  Patient presented to urgent care today with complaints of nausea, body aches and fatigue.  Urgent care found the patient to be hyperglycemic and patient was sent to the ED for further workup.  Patient has had 1 prior DKA hospitalization 2/2 urosepsis. 24-year-old female past medical history of Hashimoto's type 1 diabetes on insulin presenting to the ED for hyperglycemia.  Patient states over the past few days she has been "not feeling well".  Patient presented to urgent care today with complaints of nausea, body aches and fatigue.  Urgent care found the patient to be hyperglycemic and patient was sent to the ED for further workup.  Patient has had 1 prior DKA hospitalization 2/2 urosepsis. No NVD, fevers, chills, CP, SOB 24-year-old female past medical history of Hashimoto's, type 1 diabetes on insulin presenting to the ED for hyperglycemia.  Patient states over the past few days she has been "not feeling well".  Patient presented to urgent care today with complaints of nausea, body aches and fatigue.  Urgent care found the patient to be hyperglycemic and patient was sent to the ED for further workup.  Patient has had 1 prior DKA hospitalization 2/2 urosepsis. No NVD, fevers, chills, CP, SOB

## 2025-04-14 NOTE — ED PROVIDER NOTE - PHYSICAL EXAMINATION
CONSTITUTIONAL: well-appearing, in NAD  SKIN: Warm dry, normal skin turgor  HEAD: NCAT  EYES: EOMI, PERRLA, no scleral icterus, conjunctiva pink  ENT: normal pharynx with no erythema or exudates  NECK: Supple; non tender. Full ROM.  CARD: RRR  RESP: clear to ausculation b/l. No crackles or wheezing.  ABD: soft, non-tender, non-distended, no rebound or guarding.  EXT: Full ROM, no bony tenderness, no pedal edema, no calf tenderness  NEURO: normal motor. normal sensory. Normal gait.   PSYCH: Cooperative, appropriate.

## 2025-04-14 NOTE — ED ADULT NURSE NOTE - CHIEF COMPLAINT QUOTE
Sent in from [facility] for hyperglycemia. C/o nausea, body aches, and fatigue since Saturday. FS in triage 229

## 2025-04-14 NOTE — ED PROVIDER NOTE - CARE PROVIDER_API CALL
Misti Leahy  Pediatrics  [address], [address]  [place] [address]  Phone: [phone]  Fax: [phone]  Follow Up Time: 1-3 Days

## 2025-04-14 NOTE — ED PROVIDER NOTE - CLINICAL SUMMARY MEDICAL DECISION MAKING FREE TEXT BOX
The MDM was documented by me personally, Dr. Den Bledsoe, supervising attending and serves as my attending contribution to the care of the patient. I have personally performed a history and physical exam on this patient and personally directed the management of the patient.     the pt is a 24-year-old female past medical history of Hashimoto', type 1 diabetes on insulin presenting to the ED for hyperglycemia.  Patient presented to urgent care today with complaints of nausea, body aches and fatigue for the past couple days.  At Urgent care, the patient was found to be hyperglycemic and patient was sent to the ED for further workup    on exam:  Vitals: HD stable, O2 stable  Gen: appropriate affect, no acute distress  Neuro: no focal defects, no sensory deficits  HEENT: EOMI, no JVD, normocephalic, atraumatic, no scleral icterus  Cardio: RRR, S1 S2 present, no murmurs, rubs, or gallops  Resp: lungs b/l CTA, chest wall intact  Abd: soft, nondistended, nontender  MSK: no gross joint abnormalities, no obvious swelling  Skin: no rashes, no wounds  heme: no ecchymosis or petechiae     Labs did show glucose in 260s, otherwise reassuring, pt not in DKA, no UTI, cxr with no acute infiltration or other abnormality. pt with no chest pain. Pt tested for covid and flu. Pt was feeling better and wanted to leave the hospital. We had joint agreement for discharge and for her to follow up with her PCP/endocrinologist. pt given strict instructions and return precautions.

## 2025-04-14 NOTE — ED PROVIDER NOTE - PROGRESS NOTE DETAILS
kz: Pt states she is feeling better and would like to leave. return precautions given. pt and mom agreeable to plan

## 2025-04-14 NOTE — ED ADULT TRIAGE NOTE - CHIEF COMPLAINT QUOTE
Sent in [place] [facility] for hyperglycemia. C/o nausea, body aches, and fatigue since Saturday. FS in triage 229

## 2025-04-14 NOTE — ED ADULT NURSE NOTE - OBJECTIVE STATEMENT
Sent in from [facility] for hyperglycemia. C/o nausea, body aches, and fatigue since Saturday. FS in triage 229

## 2025-04-14 NOTE — ED PROVIDER NOTE - NSFOLLOWUPINSTRUCTIONS_ED_ALL_ED_FT
Please follow up with your endocrinologist and primary care doctor.     Hyperglycemia    Hyperglycemia occurs when the glucose (sugar) level in your blood is too high. Symptoms include increased urination, increased thirst, a dry mouth, or changes in appetite. If started on a medication, take exactly as prescribed by your health care professional. Maintain a healthy lifestyle and follow up with your primary care physician.    SEEK IMMEDIATE MEDICAL CARE IF YOU HAVE ANY OF THE FOLLOWING SYMPTOMS: shortness of breath, change in mental status, nausea or vomiting, fruity smell to your breath, or any signs of dehydration.

## 2025-05-13 NOTE — PATIENT PROFILE ADULT - NSTOBACCO TYPE_GEN_A_CORE_RD
Continue Aptiom 200 mg bid for now.     Will check sodium every 3 months, will consider further dose reduction if needed (Na<130).     Continue Fycompa 10 mg at night and Briviact 100 mg bid.     Continue to maintain log of seizures.     Use Clonazepam 0.5 mg as needed: once a day for 3 days during infection, may increase to 1 tab twice a day for up to 5 days to help with seizure cluster during infection.     As needed Diastat gel and intranasal Nazyzilam for seizures, as advised. Advised mother to not use both at the same time.     Follow up in 6 months (virtual visit), call clinic sooner at [phone] with questions or concerns or with worsening seizure frequency.     Cigarettes

## 2025-09-03 ENCOUNTER — NON-APPOINTMENT (OUTPATIENT)
Age: 25
End: 2025-09-03